# Patient Record
Sex: MALE | Race: WHITE | NOT HISPANIC OR LATINO
[De-identification: names, ages, dates, MRNs, and addresses within clinical notes are randomized per-mention and may not be internally consistent; named-entity substitution may affect disease eponyms.]

---

## 2017-07-14 ENCOUNTER — TRANSCRIPTION ENCOUNTER (OUTPATIENT)
Age: 57
End: 2017-07-14

## 2019-01-25 ENCOUNTER — TRANSCRIPTION ENCOUNTER (OUTPATIENT)
Age: 59
End: 2019-01-25

## 2021-06-20 ENCOUNTER — INPATIENT (INPATIENT)
Facility: HOSPITAL | Age: 61
LOS: 8 days | Discharge: HOME HEALTH | End: 2021-06-29
Attending: THORACIC SURGERY (CARDIOTHORACIC VASCULAR SURGERY) | Admitting: THORACIC SURGERY (CARDIOTHORACIC VASCULAR SURGERY)
Payer: COMMERCIAL

## 2021-06-20 VITALS
WEIGHT: 244.05 LBS | DIASTOLIC BLOOD PRESSURE: 101 MMHG | SYSTOLIC BLOOD PRESSURE: 189 MMHG | OXYGEN SATURATION: 98 % | HEART RATE: 98 BPM | TEMPERATURE: 98 F | RESPIRATION RATE: 20 BRPM

## 2021-06-20 LAB
ALBUMIN SERPL ELPH-MCNC: 4.6 G/DL — SIGNIFICANT CHANGE UP (ref 3.5–5.2)
ALP SERPL-CCNC: 145 U/L — HIGH (ref 30–115)
ALT FLD-CCNC: 18 U/L — SIGNIFICANT CHANGE UP (ref 0–41)
ANION GAP SERPL CALC-SCNC: 12 MMOL/L — SIGNIFICANT CHANGE UP (ref 7–14)
AST SERPL-CCNC: 13 U/L — SIGNIFICANT CHANGE UP (ref 0–41)
BASOPHILS # BLD AUTO: 0.02 K/UL — SIGNIFICANT CHANGE UP (ref 0–0.2)
BASOPHILS NFR BLD AUTO: 0.3 % — SIGNIFICANT CHANGE UP (ref 0–1)
BILIRUB SERPL-MCNC: 0.5 MG/DL — SIGNIFICANT CHANGE UP (ref 0.2–1.2)
BUN SERPL-MCNC: 19 MG/DL — SIGNIFICANT CHANGE UP (ref 10–20)
CALCIUM SERPL-MCNC: 9.6 MG/DL — SIGNIFICANT CHANGE UP (ref 8.5–10.1)
CHLORIDE SERPL-SCNC: 101 MMOL/L — SIGNIFICANT CHANGE UP (ref 98–110)
CO2 SERPL-SCNC: 22 MMOL/L — SIGNIFICANT CHANGE UP (ref 17–32)
CREAT SERPL-MCNC: 0.8 MG/DL — SIGNIFICANT CHANGE UP (ref 0.7–1.5)
EOSINOPHIL # BLD AUTO: 0.05 K/UL — SIGNIFICANT CHANGE UP (ref 0–0.7)
EOSINOPHIL NFR BLD AUTO: 0.7 % — SIGNIFICANT CHANGE UP (ref 0–8)
FLUAV AG NPH QL: NEGATIVE COUNTS — SIGNIFICANT CHANGE UP
FLUBV AG NPH QL: NEGATIVE COUNTS — SIGNIFICANT CHANGE UP
GLUCOSE BLDC GLUCOMTR-MCNC: 298 MG/DL — HIGH (ref 70–99)
GLUCOSE BLDC GLUCOMTR-MCNC: 349 MG/DL — HIGH (ref 70–99)
GLUCOSE SERPL-MCNC: 446 MG/DL — HIGH (ref 70–99)
HCT VFR BLD CALC: 44.4 % — SIGNIFICANT CHANGE UP (ref 42–52)
HGB BLD-MCNC: 14.9 G/DL — SIGNIFICANT CHANGE UP (ref 14–18)
IMM GRANULOCYTES NFR BLD AUTO: 0.6 % — HIGH (ref 0.1–0.3)
LYMPHOCYTES # BLD AUTO: 0.96 K/UL — LOW (ref 1.2–3.4)
LYMPHOCYTES # BLD AUTO: 13.7 % — LOW (ref 20.5–51.1)
MCHC RBC-ENTMCNC: 28.8 PG — SIGNIFICANT CHANGE UP (ref 27–31)
MCHC RBC-ENTMCNC: 33.6 G/DL — SIGNIFICANT CHANGE UP (ref 32–37)
MCV RBC AUTO: 85.7 FL — SIGNIFICANT CHANGE UP (ref 80–94)
MONOCYTES # BLD AUTO: 0.45 K/UL — SIGNIFICANT CHANGE UP (ref 0.1–0.6)
MONOCYTES NFR BLD AUTO: 6.4 % — SIGNIFICANT CHANGE UP (ref 1.7–9.3)
NEUTROPHILS # BLD AUTO: 5.5 K/UL — SIGNIFICANT CHANGE UP (ref 1.4–6.5)
NEUTROPHILS NFR BLD AUTO: 78.3 % — HIGH (ref 42.2–75.2)
NRBC # BLD: 0 /100 WBCS — SIGNIFICANT CHANGE UP (ref 0–0)
PLATELET # BLD AUTO: 226 K/UL — SIGNIFICANT CHANGE UP (ref 130–400)
POTASSIUM SERPL-MCNC: 4.4 MMOL/L — SIGNIFICANT CHANGE UP (ref 3.5–5)
POTASSIUM SERPL-SCNC: 4.4 MMOL/L — SIGNIFICANT CHANGE UP (ref 3.5–5)
PROT SERPL-MCNC: 7 G/DL — SIGNIFICANT CHANGE UP (ref 6–8)
RBC # BLD: 5.18 M/UL — SIGNIFICANT CHANGE UP (ref 4.7–6.1)
RBC # FLD: 12.5 % — SIGNIFICANT CHANGE UP (ref 11.5–14.5)
RSV RNA NPH QL NAA+NON-PROBE: NEGATIVE COUNTS — SIGNIFICANT CHANGE UP
SARS-COV-2 RNA SPEC QL NAA+PROBE: NEGATIVE COUNTS — SIGNIFICANT CHANGE UP
SODIUM SERPL-SCNC: 135 MMOL/L — SIGNIFICANT CHANGE UP (ref 135–146)
TROPONIN T SERPL-MCNC: 0.02 NG/ML — HIGH
TROPONIN T SERPL-MCNC: <0.01 NG/ML — SIGNIFICANT CHANGE UP
WBC # BLD: 7.02 K/UL — SIGNIFICANT CHANGE UP (ref 4.8–10.8)
WBC # FLD AUTO: 7.02 K/UL — SIGNIFICANT CHANGE UP (ref 4.8–10.8)

## 2021-06-20 PROCEDURE — 71046 X-RAY EXAM CHEST 2 VIEWS: CPT | Mod: 26

## 2021-06-20 PROCEDURE — 75574 CT ANGIO HRT W/3D IMAGE: CPT | Mod: 26,MA

## 2021-06-20 PROCEDURE — 99222 1ST HOSP IP/OBS MODERATE 55: CPT

## 2021-06-20 PROCEDURE — 99236 HOSP IP/OBS SAME DATE HI 85: CPT

## 2021-06-20 PROCEDURE — 93010 ELECTROCARDIOGRAM REPORT: CPT

## 2021-06-20 RX ORDER — METOPROLOL TARTRATE 50 MG
100 TABLET ORAL ONCE
Refills: 0 | Status: COMPLETED | OUTPATIENT
Start: 2021-06-20 | End: 2021-06-20

## 2021-06-20 RX ORDER — FAMOTIDINE 10 MG/ML
20 INJECTION INTRAVENOUS ONCE
Refills: 0 | Status: COMPLETED | OUTPATIENT
Start: 2021-06-20 | End: 2021-06-20

## 2021-06-20 RX ORDER — ASPIRIN/CALCIUM CARB/MAGNESIUM 324 MG
81 TABLET ORAL ONCE
Refills: 0 | Status: COMPLETED | OUTPATIENT
Start: 2021-06-20 | End: 2021-06-20

## 2021-06-20 RX ORDER — SODIUM CHLORIDE 9 MG/ML
1000 INJECTION, SOLUTION INTRAVENOUS ONCE
Refills: 0 | Status: COMPLETED | OUTPATIENT
Start: 2021-06-20 | End: 2021-06-20

## 2021-06-20 RX ADMIN — SODIUM CHLORIDE 1000 MILLILITER(S): 9 INJECTION, SOLUTION INTRAVENOUS at 06:05

## 2021-06-20 RX ADMIN — Medication 100 MILLIGRAM(S): at 09:28

## 2021-06-20 RX ADMIN — FAMOTIDINE 20 MILLIGRAM(S): 10 INJECTION INTRAVENOUS at 06:06

## 2021-06-20 RX ADMIN — Medication 100 MILLIGRAM(S): at 11:53

## 2021-06-20 RX ADMIN — Medication 81 MILLIGRAM(S): at 07:57

## 2021-06-20 NOTE — ED CDU PROVIDER INITIAL DAY NOTE - ATTENDING CONTRIBUTION TO CARE
Pt reports chest pain that started last night after eating and lasting several hours. Describes as a feeling of burping. . No associated shortness of breath, nausea, vomiting or diaphoresis. + DM. On exam S1S2 rrr, lungs clear, abdomen is soft nontender, ext neg for edema or tenderness.

## 2021-06-20 NOTE — ED PROVIDER NOTE - OBJECTIVE STATEMENT
The pt is a 60y M w/ hx of DM, HLD, BPH presenting with retrosternal chest pain that started last evening after eating spaghetti. Pt has never felt this pain before. No radiation. 4/10 in severity. Took a baby aspirin at home. Endorses mild SOB. Denies headache, N/V, abdominal pain, diarrhea, dysuria.

## 2021-06-20 NOTE — ED PROVIDER NOTE - ATTENDING CONTRIBUTION TO CARE
Patient is c/o chest pain, substernal area, denies trauma.   Vitals reviewed.   Lungs: CTA  abd: +BS, NT, ND, soft,   A/P: Chest pain,   labs, EKG, CXR,   reevaluation.

## 2021-06-20 NOTE — CONSULT NOTE ADULT - SUBJECTIVE AND OBJECTIVE BOX
HPI:  61 yo male patient with PMHx of BPH, HTN, DMII, presenting because of chest pain.  Patient reports having new onset chest pain, after dinner yesterday, non exertional.  No usual chest pain when he walks or climb stairs.    PAST MEDICAL & SURGICAL HISTORY    HTN  DMII  BPH    FAMILY HISTORY:  FAMILY HISTORY:  No pertinent family history in first degree relatives    SOCIAL HISTORY:  [x] exsmoker  []Alcohol  []Drug    ALLERGIES:  No Known Allergies    HOME MEDICATIONS:  atorva 20  alfuzocin  metformin/glipizide      VITALS:   T(F): 98.8 (06-20 @ 07:34), Max: 98.8 (06-20 @ 07:34)  HR: 82 (06-20 @ 07:34) (82 - 98)  BP: 183/97 (06-20 @ 07:34) (183/97 - 189/101)  BP(mean): --  RR: 18 (06-20 @ 07:34) (18 - 20)  SpO2: 98% (06-20 @ 07:34) (98% - 98%)    I&O's Summary      REVIEW OF SYSTEMS:  CONSTITUTIONAL: No weakness, fevers or chills  EYES: No visual changes  ENT: No vertigo or throat pain   NECK: No pain or stiffness  RESPIRATORY: No cough, wheezing, hemoptysis; No shortness of breath  CARDIOVASCULAR: (+) chest pain, no palpitations  GASTROINTESTINAL: No abdominal or epigastric pain. No nausea, vomiting, or hematemesis; No diarrhea or constipation. No melena or hematochezia.  GENITOURINARY: No dysuria, frequency or hematuria  NEUROLOGICAL: No numbness or weakness  SKIN: No itching, no rashes  MSK: No pain    PHYSICAL EXAM:  NEURO: patient is awake , alert and oriented  GEN: Not in acute distress  NECK: no thyroid enlargement, no JVD  LUNGS: Clear to auscultation bilaterally   CARDIOVASCULAR: S1/S2 present, RRR , no murmurs or rubs, no carotid bruits,  + PP bilaterally  ABD: Soft, non-tender, non-distended, +BS  EXT: No ERIN  SKIN: Intact    LABS:                        14.9   7.02  )-----------( 226      ( 20 Jun 2021 05:20 )             44.4     06-20    135  |  101  |  19  ----------------------------<  446<H>  4.4   |  22  |  0.8    Ca    9.6      20 Jun 2021 05:20    TPro  7.0  /  Alb  4.6  /  TBili  0.5  /  DBili  x   /  AST  13  /  ALT  18  /  AlkPhos  145<H>  06-20      Troponin T, Serum: 0.02 ng/mL *H* (06-20-21 @ 08:24)  Troponin T, Serum: <0.01 ng/mL (06-20-21 @ 05:20)    CARDIAC MARKERS ( 20 Jun 2021 08:24 )  x     / 0.02 ng/mL / x     / x     / x      CARDIAC MARKERS ( 20 Jun 2021 05:20 )  x     / <0.01 ng/mL / x     / x     / x            Troponin trend:            RADIOLOGY:  -CXR: no acute pathology    -TTE:  -CCTA:  -STRESS TEST:  -CATHETERIZATION:    ECG:  sinus at 98, PVC    TELEMETRY EVENTS:   HPI:  61 yo male patient with PMHx of BPH, HTN, DMII, presenting because of chest pain.  Patient reports having new onset chest pain, after dinner yesterday, non exertional.  No usual chest pain when he walks or climb stairs.      PAST MEDICAL & SURGICAL HISTORY    HTN  DMII  BPH    FAMILY HISTORY:  FAMILY HISTORY:  No pertinent family history in first degree relatives    SOCIAL HISTORY:  [x] exsmoker  []Alcohol  []Drug    ALLERGIES:  No Known Allergies    HOME MEDICATIONS:  atorva 20  alfuzocin  metformin/glipizide      VITALS:   T(F): 98.8 (06-20 @ 07:34), Max: 98.8 (06-20 @ 07:34)  HR: 82 (06-20 @ 07:34) (82 - 98)  BP: 183/97 (06-20 @ 07:34) (183/97 - 189/101)  BP(mean): --  RR: 18 (06-20 @ 07:34) (18 - 20)  SpO2: 98% (06-20 @ 07:34) (98% - 98%)    I&O's Summary      REVIEW OF SYSTEMS:  CONSTITUTIONAL: No weakness, fevers or chills  EYES: No visual changes  ENT: No vertigo or throat pain   NECK: No pain or stiffness  RESPIRATORY: No cough, wheezing, hemoptysis; No shortness of breath  CARDIOVASCULAR: (+) chest pain, no palpitations  GASTROINTESTINAL: No abdominal or epigastric pain. No nausea, vomiting, or hematemesis; No diarrhea or constipation. No melena or hematochezia.  GENITOURINARY: No dysuria, frequency or hematuria  NEUROLOGICAL: No numbness or weakness  SKIN: No itching, no rashes  MSK: No pain    PHYSICAL EXAM:  NEURO: patient is awake , alert and oriented  GEN: Not in acute distress  NECK: no thyroid enlargement, no JVD  LUNGS: Clear to auscultation bilaterally   CARDIOVASCULAR: S1/S2 present, RRR , no murmurs or rubs, no carotid bruits,  + PP bilaterally  ABD: Soft, non-tender, non-distended, +BS  EXT: No ERIN  SKIN: Intact    LABS:                        14.9   7.02  )-----------( 226      ( 20 Jun 2021 05:20 )             44.4     06-20    135  |  101  |  19  ----------------------------<  446<H>  4.4   |  22  |  0.8    Ca    9.6      20 Jun 2021 05:20    TPro  7.0  /  Alb  4.6  /  TBili  0.5  /  DBili  x   /  AST  13  /  ALT  18  /  AlkPhos  145<H>  06-20      Troponin T, Serum: 0.02 ng/mL *H* (06-20-21 @ 08:24)  Troponin T, Serum: <0.01 ng/mL (06-20-21 @ 05:20)    CARDIAC MARKERS ( 20 Jun 2021 08:24 )  x     / 0.02 ng/mL / x     / x     / x      CARDIAC MARKERS ( 20 Jun 2021 05:20 )  x     / <0.01 ng/mL / x     / x     / x            Troponin trend:            RADIOLOGY:  -CXR: no acute pathology    -TTE:  -CCTA:  -STRESS TEST:  -CATHETERIZATION:    ECG:  sinus at 98, PVC    TELEMETRY EVENTS:

## 2021-06-20 NOTE — ED PROVIDER NOTE - PHYSICAL EXAMINATION
[place] Vital Signs: I have reviewed the initial vital signs.  Constitutional: well-nourished, appears stated age, no acute distress  Cardiovascular: regular rate, regular rhythm, well-perfused extremities  Respiratory: unlabored respiratory effort, clear to auscultation bilaterally  Gastrointestinal: soft, non-tender abdomen  Musculoskeletal: supple neck, no lower extremity edema  Integumentary: warm, dry, no rash  Neurologic: awake, alert, extremities’ motor and sensory functions grossly intact  Psychiatric: appropriate mood, appropriate affect

## 2021-06-20 NOTE — CONSULT NOTE ADULT - ASSESSMENT
IMPRESSION:  - Atypical chest pain  - multiple risk factors: Ex-smoker, male, age, DM, HTN  - ECG and trops noted    PLAN:  - Repeat ECG and cardiac enzymes  - loading with aspirin  - continue statin  - Get CCTA  - Dispo after CCTA

## 2021-06-20 NOTE — ED ADULT NURSE REASSESSMENT NOTE - NS ED NURSE REASSESS COMMENT FT1
Pt is A&Ox4, currently denies pain or discomfort. Continuous cardiac monitoring maintained as ordered. Will continue to monitor.

## 2021-06-20 NOTE — ED CDU PROVIDER DISPOSITION NOTE - CLINICAL COURSE
pt has CAD RAD 4 with multi vessel severe narrowing and occlusion. Will admit for Cath tomorrow AM with Dr. Lo Pt presented with chest pain. One CE was positive. CCTA found severe CAD. Admited to tele for cardiac intervention. pt has CAD RAD 4 with multi vessel severe narrowing and occlusion. Will admit for Cath tomorrow AM with Dr. Lo

## 2021-06-20 NOTE — ED CDU PROVIDER INITIAL DAY NOTE - MEDICAL DECISION MAKING DETAILS
Second CE + troponin. Spoke to Dr. Lo will evaluate and decide if cath or CCTA. Second CE + troponin. Spoke to Dr. Lo will evaluate and decide if cath or CCTA. CCTA + for multivessel disease.

## 2021-06-20 NOTE — ED CDU PROVIDER INITIAL DAY NOTE - PROGRESS NOTE DETAILS
pt had 2nd troponin drawn was elevated 0.02. spoke with cardiology Dr. medina will evaluate patient bedside wants patient admitted for cath. will admit pt pts CCTA came back with CAD RAD 4 severe narrowing and occlusion will admit patient plan discussed with Dr. Ayala and Dr. Lo

## 2021-06-20 NOTE — ED CDU PROVIDER INITIAL DAY NOTE - OBJECTIVE STATEMENT
pt is a 60y male with pmhx of DM, HLD, BPH presents c/o cp that started s/p drinking alcohol and eating spaghetti. pt states his pain lasted a few hours. pt noted that he felt like belching. pt denies any diaphoresis, nausea, vomiting, dizziness, headache. pt denies any family hx of CAD, and was former smoker.

## 2021-06-21 LAB
A1C WITH ESTIMATED AVERAGE GLUCOSE RESULT: 12 % — HIGH (ref 4–5.6)
ALBUMIN SERPL ELPH-MCNC: 4.4 G/DL — SIGNIFICANT CHANGE UP (ref 3.5–5.2)
ALP SERPL-CCNC: 80 U/L — SIGNIFICANT CHANGE UP (ref 30–115)
ALT FLD-CCNC: 16 U/L — SIGNIFICANT CHANGE UP (ref 0–41)
ANION GAP SERPL CALC-SCNC: 10 MMOL/L — SIGNIFICANT CHANGE UP (ref 7–14)
AST SERPL-CCNC: 15 U/L — SIGNIFICANT CHANGE UP (ref 0–41)
BASOPHILS # BLD AUTO: 0.03 K/UL — SIGNIFICANT CHANGE UP (ref 0–0.2)
BASOPHILS NFR BLD AUTO: 0.4 % — SIGNIFICANT CHANGE UP (ref 0–1)
BILIRUB SERPL-MCNC: 0.9 MG/DL — SIGNIFICANT CHANGE UP (ref 0.2–1.2)
BUN SERPL-MCNC: 12 MG/DL — SIGNIFICANT CHANGE UP (ref 10–20)
CALCIUM SERPL-MCNC: 9.3 MG/DL — SIGNIFICANT CHANGE UP (ref 8.5–10.1)
CHLORIDE SERPL-SCNC: 102 MMOL/L — SIGNIFICANT CHANGE UP (ref 98–110)
CHOLEST SERPL-MCNC: 182 MG/DL — SIGNIFICANT CHANGE UP
CO2 SERPL-SCNC: 27 MMOL/L — SIGNIFICANT CHANGE UP (ref 17–32)
CREAT SERPL-MCNC: 0.8 MG/DL — SIGNIFICANT CHANGE UP (ref 0.7–1.5)
EOSINOPHIL # BLD AUTO: 0.11 K/UL — SIGNIFICANT CHANGE UP (ref 0–0.7)
EOSINOPHIL NFR BLD AUTO: 1.3 % — SIGNIFICANT CHANGE UP (ref 0–8)
ESTIMATED AVERAGE GLUCOSE: 298 MG/DL — HIGH (ref 68–114)
GLUCOSE BLDC GLUCOMTR-MCNC: 210 MG/DL — HIGH (ref 70–99)
GLUCOSE BLDC GLUCOMTR-MCNC: 245 MG/DL — HIGH (ref 70–99)
GLUCOSE BLDC GLUCOMTR-MCNC: 270 MG/DL — HIGH (ref 70–99)
GLUCOSE SERPL-MCNC: 254 MG/DL — HIGH (ref 70–99)
HCT VFR BLD CALC: 44.2 % — SIGNIFICANT CHANGE UP (ref 42–52)
HCV AB S/CO SERPL IA: 0.03 COI — SIGNIFICANT CHANGE UP
HCV AB SERPL-IMP: SIGNIFICANT CHANGE UP
HDLC SERPL-MCNC: 42 MG/DL — SIGNIFICANT CHANGE UP
HGB BLD-MCNC: 14.9 G/DL — SIGNIFICANT CHANGE UP (ref 14–18)
IMM GRANULOCYTES NFR BLD AUTO: 0.5 % — HIGH (ref 0.1–0.3)
LIPID PNL WITH DIRECT LDL SERPL: 121 MG/DL — HIGH
LYMPHOCYTES # BLD AUTO: 1.76 K/UL — SIGNIFICANT CHANGE UP (ref 1.2–3.4)
LYMPHOCYTES # BLD AUTO: 21.2 % — SIGNIFICANT CHANGE UP (ref 20.5–51.1)
MAGNESIUM SERPL-MCNC: 2 MG/DL — SIGNIFICANT CHANGE UP (ref 1.8–2.4)
MCHC RBC-ENTMCNC: 28.8 PG — SIGNIFICANT CHANGE UP (ref 27–31)
MCHC RBC-ENTMCNC: 33.7 G/DL — SIGNIFICANT CHANGE UP (ref 32–37)
MCV RBC AUTO: 85.5 FL — SIGNIFICANT CHANGE UP (ref 80–94)
MONOCYTES # BLD AUTO: 0.68 K/UL — HIGH (ref 0.1–0.6)
MONOCYTES NFR BLD AUTO: 8.2 % — SIGNIFICANT CHANGE UP (ref 1.7–9.3)
MRSA PCR RESULT.: NEGATIVE — SIGNIFICANT CHANGE UP
NEUTROPHILS # BLD AUTO: 5.7 K/UL — SIGNIFICANT CHANGE UP (ref 1.4–6.5)
NEUTROPHILS NFR BLD AUTO: 68.4 % — SIGNIFICANT CHANGE UP (ref 42.2–75.2)
NON HDL CHOLESTEROL: 140 MG/DL — HIGH
NRBC # BLD: 0 /100 WBCS — SIGNIFICANT CHANGE UP (ref 0–0)
PLATELET # BLD AUTO: 229 K/UL — SIGNIFICANT CHANGE UP (ref 130–400)
POTASSIUM SERPL-MCNC: 4.3 MMOL/L — SIGNIFICANT CHANGE UP (ref 3.5–5)
POTASSIUM SERPL-SCNC: 4.3 MMOL/L — SIGNIFICANT CHANGE UP (ref 3.5–5)
PROT SERPL-MCNC: 6.6 G/DL — SIGNIFICANT CHANGE UP (ref 6–8)
RBC # BLD: 5.17 M/UL — SIGNIFICANT CHANGE UP (ref 4.7–6.1)
RBC # FLD: 12.5 % — SIGNIFICANT CHANGE UP (ref 11.5–14.5)
SODIUM SERPL-SCNC: 139 MMOL/L — SIGNIFICANT CHANGE UP (ref 135–146)
TRIGL SERPL-MCNC: 179 MG/DL — HIGH
TROPONIN T SERPL-MCNC: 0.07 NG/ML — CRITICAL HIGH
WBC # BLD: 8.32 K/UL — SIGNIFICANT CHANGE UP (ref 4.8–10.8)
WBC # FLD AUTO: 8.32 K/UL — SIGNIFICANT CHANGE UP (ref 4.8–10.8)

## 2021-06-21 PROCEDURE — 93458 L HRT ARTERY/VENTRICLE ANGIO: CPT | Mod: 26

## 2021-06-21 PROCEDURE — 99233 SBSQ HOSP IP/OBS HIGH 50: CPT

## 2021-06-21 PROCEDURE — 93306 TTE W/DOPPLER COMPLETE: CPT | Mod: 26

## 2021-06-21 PROCEDURE — 99253 IP/OBS CNSLTJ NEW/EST LOW 45: CPT

## 2021-06-21 PROCEDURE — 99232 SBSQ HOSP IP/OBS MODERATE 35: CPT | Mod: 57

## 2021-06-21 PROCEDURE — 99223 1ST HOSP IP/OBS HIGH 75: CPT

## 2021-06-21 PROCEDURE — 93880 EXTRACRANIAL BILAT STUDY: CPT | Mod: 26

## 2021-06-21 PROCEDURE — 71250 CT THORAX DX C-: CPT | Mod: 26,59

## 2021-06-21 RX ORDER — TAMSULOSIN HYDROCHLORIDE 0.4 MG/1
0.4 CAPSULE ORAL AT BEDTIME
Refills: 0 | Status: DISCONTINUED | OUTPATIENT
Start: 2021-06-21 | End: 2021-06-24

## 2021-06-21 RX ORDER — SODIUM CHLORIDE 9 MG/ML
1000 INJECTION, SOLUTION INTRAVENOUS
Refills: 0 | Status: DISCONTINUED | OUTPATIENT
Start: 2021-06-21 | End: 2021-06-24

## 2021-06-21 RX ORDER — INSULIN LISPRO 100/ML
4 VIAL (ML) SUBCUTANEOUS
Refills: 0 | Status: DISCONTINUED | OUTPATIENT
Start: 2021-06-21 | End: 2021-06-22

## 2021-06-21 RX ORDER — GLUCAGON INJECTION, SOLUTION 0.5 MG/.1ML
1 INJECTION, SOLUTION SUBCUTANEOUS ONCE
Refills: 0 | Status: DISCONTINUED | OUTPATIENT
Start: 2021-06-21 | End: 2021-06-24

## 2021-06-21 RX ORDER — INSULIN LISPRO 100/ML
VIAL (ML) SUBCUTANEOUS
Refills: 0 | Status: DISCONTINUED | OUTPATIENT
Start: 2021-06-21 | End: 2021-06-24

## 2021-06-21 RX ORDER — NITROGLYCERIN 6.5 MG
0.4 CAPSULE, EXTENDED RELEASE ORAL
Refills: 0 | Status: DISCONTINUED | OUTPATIENT
Start: 2021-06-21 | End: 2021-06-22

## 2021-06-21 RX ORDER — HEPARIN SODIUM 5000 [USP'U]/ML
5000 INJECTION INTRAVENOUS; SUBCUTANEOUS EVERY 8 HOURS
Refills: 0 | Status: DISCONTINUED | OUTPATIENT
Start: 2021-06-22 | End: 2021-06-23

## 2021-06-21 RX ORDER — METOPROLOL TARTRATE 50 MG
12.5 TABLET ORAL EVERY 12 HOURS
Refills: 0 | Status: DISCONTINUED | OUTPATIENT
Start: 2021-06-21 | End: 2021-06-22

## 2021-06-21 RX ORDER — DEXTROSE 50 % IN WATER 50 %
15 SYRINGE (ML) INTRAVENOUS ONCE
Refills: 0 | Status: DISCONTINUED | OUTPATIENT
Start: 2021-06-21 | End: 2021-06-24

## 2021-06-21 RX ORDER — DEXTROSE 50 % IN WATER 50 %
12.5 SYRINGE (ML) INTRAVENOUS ONCE
Refills: 0 | Status: DISCONTINUED | OUTPATIENT
Start: 2021-06-21 | End: 2021-06-24

## 2021-06-21 RX ORDER — DEXTROSE 50 % IN WATER 50 %
25 SYRINGE (ML) INTRAVENOUS ONCE
Refills: 0 | Status: DISCONTINUED | OUTPATIENT
Start: 2021-06-21 | End: 2021-06-24

## 2021-06-21 RX ORDER — ASPIRIN/CALCIUM CARB/MAGNESIUM 324 MG
81 TABLET ORAL DAILY
Refills: 0 | Status: DISCONTINUED | OUTPATIENT
Start: 2021-06-21 | End: 2021-06-24

## 2021-06-21 RX ORDER — ATORVASTATIN CALCIUM 80 MG/1
80 TABLET, FILM COATED ORAL AT BEDTIME
Refills: 0 | Status: DISCONTINUED | OUTPATIENT
Start: 2021-06-21 | End: 2021-06-24

## 2021-06-21 RX ORDER — INSULIN GLARGINE 100 [IU]/ML
10 INJECTION, SOLUTION SUBCUTANEOUS EVERY MORNING
Refills: 0 | Status: DISCONTINUED | OUTPATIENT
Start: 2021-06-21 | End: 2021-06-22

## 2021-06-21 RX ADMIN — ATORVASTATIN CALCIUM 80 MILLIGRAM(S): 80 TABLET, FILM COATED ORAL at 21:13

## 2021-06-21 RX ADMIN — Medication 4: at 17:39

## 2021-06-21 RX ADMIN — INSULIN GLARGINE 10 UNIT(S): 100 INJECTION, SOLUTION SUBCUTANEOUS at 08:24

## 2021-06-21 RX ADMIN — Medication 12.5 MILLIGRAM(S): at 17:40

## 2021-06-21 RX ADMIN — TAMSULOSIN HYDROCHLORIDE 0.4 MILLIGRAM(S): 0.4 CAPSULE ORAL at 21:12

## 2021-06-21 RX ADMIN — Medication 4 UNIT(S): at 17:39

## 2021-06-21 RX ADMIN — Medication 81 MILLIGRAM(S): at 11:37

## 2021-06-21 NOTE — H&P ADULT - NSHPPHYSICALEXAM_GEN_ALL_CORE
GENERAL: NAD, lying in bed comfortably  HEAD:  Atraumatic, Normocephalic  EYES: EOMI, PERRLA, conjunctiva and sclera clear  ENT: Moist mucous membranes  NECK: Supple, No JVD  CHEST/LUNG: Clear to auscultation bilaterally; No rales, rhonchi, wheezing, or rubs.   HEART: Regular rate and rhythm; No murmurs, rubs, or gallops  ABDOMEN: Bowel sounds present; Soft, Nontender  EXTREMITIES:  2+ Peripheral Pulses, brisk capillary refill. No clubbing, cyanosis, or edema  NERVOUS SYSTEM:  Alert & Oriented X3, speech clear. No deficits   MSK: FROM all 4 extremities, full and equal strength

## 2021-06-21 NOTE — H&P ADULT - NSHPLABSRESULTS_GEN_ALL_CORE
14.9   7.02  )-----------( 226      ( 20 Jun 2021 05:20 )             44.4       06-20    135  |  101  |  19  ----------------------------<  446<H>  4.4   |  22  |  0.8    Ca    9.6      20 Jun 2021 05:20    TPro  7.0  /  Alb  4.6  /  TBili  0.5  /  DBili  x   /  AST  13  /  ALT  18  /  AlkPhos  145<H>  06-20          CARDIAC MARKERS ( 20 Jun 2021 08:24 )  x     / 0.02 ng/mL / x     / x     / x      CARDIAC MARKERS ( 20 Jun 2021 05:20 )  x     / <0.01 ng/mL / x     / x     / x            CAPILLARY BLOOD GLUCOSE      POCT Blood Glucose.: 298 mg/dL (20 Jun 2021 21:56)

## 2021-06-21 NOTE — H&P ADULT - ASSESSMENT
60y M w/ hx of DM, HLD, BPH presenting with atypical chest pain, found to have 3V disease on CCTA    #Atypical chest pain/ 3V disease  - NPO after midnight for cath in the AM  - Loaded with aspirin, c/w aspirin   - C/w statin   - HR controlled, no BB  - No Plavix as may need CABG this admission     #DM, F/u A1c, Start insulin regimen   #DLD c/w statin   #HTN      Activity as tolerated   Diet DASH/TLC  DVT PPX Lovenox   GI ppx not indicated   Dispo from home   Code full    60y M w/ hx of DM, HLD, BPH presenting with atypical chest pain, found to have 3V disease on CCTA    #Atypical chest pain/ 3V disease  - NPO after midnight for cath in the AM  - Loaded with aspirin, c/w aspirin   - C/w statin   - HR controlled, no BB  - No Plavix as may need CABG this admission   - F/u repeat EKG and troponin   - F/u echo     #DM, F/u A1c, Start insulin regimen   #DLD c/w statin   #HTN      Activity as tolerated   Diet DASH/TLC  DVT PPX Lovenox after cath  GI ppx not indicated   Dispo from home   Code full    60y M w/ hx of DM, HLD, BPH presenting with atypical chest pain, found to have 3V disease on CCTA    #Atypical chest pain/ 3V disease/ r/o ACS  - NPO after midnight for cath in the AM  - Loaded with aspirin, c/w aspirin   - C/w statin   - HR controlled, no BB  - No Plavix as may need CABG this admission   - F/u repeat EKG and troponin   - F/u echo     #DM, F/u A1c, Start insulin regimen   #DLD c/w statin, f/u lipid profile       Activity as tolerated   Diet DASH/TLC, NPO for now   DVT PPX Lovenox after cath  GI ppx not indicated   Dispo from home   Code full    60y M w/ hx of DM, HLD, BPH presenting with atypical chest pain, found to have 3V disease on CCTA    #Atypical chest pain/ 3V disease/ r/o ACS  - NPO after midnight for cath in the AM  - Loaded with aspirin, c/w aspirin   - C/w statin   - HR controlled, no BB  - No Plavix as may need CABG this admission   - F/u repeat EKG and troponin   - F/u echo     #DM, hold PO medications  F/u A1c, Start insulin regimen   #DLD c/w statin but increase from 20 to 80 ( high intensity), f/u lipid profile   #BPH C/w alfuzosin     Activity as tolerated   Diet DASH/TLC, NPO for now   DVT PPX Lovenox after cath  GI ppx not indicated   Dispo from home   Code full

## 2021-06-21 NOTE — H&P ADULT - ATTENDING COMMENTS
61 YO M with a PMH of DM2, HLD, and BPH who presents ot the hospital with a c/o CP for the past x 1 day. Described as tightness, substernal, non-radiating, and constant (lasting hours, now resolved). - worse with exertion. Associated with - SOB, - nausea, - palpitations, and - diaphoresis. Did not take SLN or ASA prior to arrival. Denies any fevers/chills, cough, ABD pain, LE swelling, dysuria, headaches, or rashes.     In the ED, cardiac enzymes were negative and an EKG showed no ischemic changes. Placed in OBS. CCTA showed moderate stenosis in the proximal LAD. ASA given in the ED. Cardio consulted and plans to take pt for CATH in the AM    - family hx of heart disease    Physical exam shows pt in NAD, resting comfortably. VSS, afebrile, not hypoxic on RA. A&Ox3. Neuro exam intact. CTA B/L with no W/C/R. RRR, no M/G/R. ABD is soft and non-tender to palpation, normoactive BSs. LEs without swelling, pulses palpated bilaterally. No rashes. Labs and imaging as above resident note.     Chest pain, positive CCTA, rule out ACS. Admit to tele. Cardio is following, plan is for CATH in the AM. Serial cardiac enzymes and EKGs. A1c, Lipids. ASA given in the ED. Echo. PRN pain meds. Restart home meds.     Diabetes mellitus with hyperglycemia. A1c. FSs. Insulin PRN.     Hx of HLD and BPH. Restart home meds, except as stated above. DVT PPX. Inform PCP of pt's admission to hospital. My note supersedes the residents note.

## 2021-06-21 NOTE — H&P ADULT - HISTORY OF PRESENT ILLNESS
60y M w/ hx of DM, HLD, BPH presenting with retrosternal chest pain that started last evening after eating spaghetti. Pt has never felt this pain before. No radiation. 4/10 in severity, non exertional . Took a baby aspirin at home. Endorses mild SOB. Denies headache, N/V, abdominal pain, diarrhea, dysuria.    Was admitted for Obs,where he underwent CCTA that showed 3V disease, EKG with no ischemic changes, troponin <0.01--> 0.02, admitted for Cath in the AM, Loaded with aspirin in the ED   60y M w/ hx of DM, HLD, BPH presenting with retrosternal chest pain that started last evening after dinner.. No radiation. 4/10 in severity, non exertional . Took a baby aspirin at home. Patient had episode of chest pain last week that resolved spontaneously, no previous chest pain or hx of cardiac disease Endorses mild SOB on exertion. Denies headache, N/V, abdominal pain, diarrhea, dysuria.    Was admitted for Obs,where he underwent CCTA that showed 3V disease, EKG with no ischemic changes, troponin <0.01--> 0.02, admitted for Cath in the AM, Loaded with aspirin in the ED

## 2021-06-21 NOTE — CHART NOTE - NSCHARTNOTEFT_GEN_A_CORE
Patient seen at bedside. Denies CP at rest or when walking to the bathroom. Elevated trop this morning noted. No acute events overnight.     PHYSICAL EXAM:  GENERAL: NAD, lying in bed comfortably  HEAD:  Atraumatic, Normocephalic  EYES: EOMI, PERRLA, conjunctiva and sclera clear  ENT: Moist mucous membranes  NECK: Supple, No JVD  CHEST/LUNG: Clear to auscultation bilaterally; No rales, rhonchi, wheezing, or rubs. Unlabored respirations  HEART: Regular rate and rhythm; No murmurs, rubs, or gallops  ABDOMEN: Bowel sounds present; Soft, Nontender, Nondistended. No hepatomegally  EXTREMITIES:  2+ Peripheral Pulses, brisk capillary refill. No clubbing, cyanosis, or edema  NERVOUS SYSTEM:  Alert & Oriented X3, speech clear. No deficits   MSK: FROM all 4 extremities, full and equal strength  SKIN: No rashes or lesions    60y M w/ hx of DM, HLD, BPH presenting with atypical chest pain, found to have 3V disease on CCTA    #Atypical chest pain - likely NSTEMI  #new 3 vessel CAD   - CCTA 6/20: 3v disease, calcium score 638     - prox-mid LAD severe narrowing     - mid-distal LCX severe narrowing     - prox RCA severe narrowing   - trop: 0.01 > 0.02 > 0.07  - repeat ekg this am stable   - F/u echo   - Loaded with aspirin, c/w aspirin; c/w atorvastatin 80mg hs   - No Plavix as may need CABG this admission   - HR controlled, no BB  - Cath today     #DM, hold PO medications  F/u A1c, adjust insulin regimen   #DLD c/w statin but increase from 20 to 80 ( high intensity), lipid profile: ,   #BPH C/w alfuzosin     Diet DASH/TLC, NPO for now   GI ppx: none   DVT PPX Lovenox after cath  GI ppx not indicated   Dispo from home; cath today, f/u cath results and for stents vs. CT surgery/CABG eval   Code full Patient seen at bedside. Denies CP at rest or when walking to the bathroom. Elevated trop this morning noted. No acute events overnight.     PHYSICAL EXAM:  GENERAL: NAD, lying in bed comfortably  HEAD:  Atraumatic, Normocephalic  EYES: EOMI, PERRLA, conjunctiva and sclera clear  ENT: Moist mucous membranes  NECK: Supple, No JVD  CHEST/LUNG: Clear to auscultation bilaterally; No rales, rhonchi, wheezing, or rubs. Unlabored respirations  HEART: Regular rate and rhythm; No murmurs, rubs, or gallops  ABDOMEN: Bowel sounds present; Soft, Nontender, Nondistended. No hepatomegally  EXTREMITIES:  2+ Peripheral Pulses, brisk capillary refill. No clubbing, cyanosis, or edema  NERVOUS SYSTEM:  Alert & Oriented X3, speech clear. No deficits   MSK: FROM all 4 extremities, full and equal strength  SKIN: No rashes or lesions    60y M w/ hx of DM, HLD, BPH presenting with atypical chest pain, found to have 3V disease on CCTA    #Atypical chest pain - likely NSTEMI  #new 3 vessel CAD   - CCTA 6/20: 3v disease, calcium score 638     - prox-mid LAD severe narrowing     - mid-distal LCX severe narrowing     - prox RCA severe narrowing   - trop: 0.01 > 0.02 > 0.07  - repeat ekg this am stable   - TTE 6/21: EF 62%, normal systolic/diastolic function; mild dilation of ascending aorta 4cm   - Loaded with aspirin, c/w aspirin; c/w atorvastatin 80mg hs   - No Plavix as may need CABG this admission   - HR controlled, no BB  - Cath today as add on     #T2DM uncontrolled  -  takes metformin 500mg BID at home  - A1c 6/21/2021: 12   - hold home meds, monitor fs, adjust insulin as needed    #DLD: c/w statin but increase from 20 to 80 ( high intensity), lipid profile: ,   #BPH: c/w alfuzosin     Diet DASH/TLC, NPO for now   GI ppx: none   DVT PPX Lovenox after cath  GI ppx not indicated   Dispo from home; cath today, f/u cath results and for stents vs. CT surgery/CABG eval   Code full Patient seen at bedside. Denies CP at rest or when walking to the bathroom. Elevated trop this morning noted. No acute events overnight.     PHYSICAL EXAM:  GENERAL: NAD, lying in bed comfortably  HEAD:  Atraumatic, Normocephalic  EYES: EOMI, PERRLA, conjunctiva and sclera clear  ENT: Moist mucous membranes  NECK: Supple, No JVD  CHEST/LUNG: Clear to auscultation bilaterally; No rales, rhonchi, wheezing, or rubs. Unlabored respirations  HEART: Regular rate and rhythm; No murmurs, rubs, or gallops  ABDOMEN: Bowel sounds present; Soft, Nontender, Nondistended. No hepatomegally  EXTREMITIES:  2+ Peripheral Pulses, brisk capillary refill. No clubbing, cyanosis, or edema  NERVOUS SYSTEM:  Alert & Oriented X3, speech clear. No deficits   MSK: FROM all 4 extremities, full and equal strength  SKIN: No rashes or lesions    60y M w/ hx of DM, HLD, BPH presenting with atypical chest pain, found to have 3V disease on CCTA    #Atypical chest pain - likely NSTEMI  #new 3 vessel CAD   - CCTA 6/20: 3v disease, calcium score 638     - prox-mid LAD severe narrowing     - mid-distal LCX severe narrowing     - prox RCA severe narrowing   - trop: 0.01 > 0.02 > 0.07  - repeat ekg this am stable   - TTE 6/21: EF 62%, normal systolic/diastolic function; mild dilation of ascending aorta 4cm   - Loaded with aspirin, c/w aspirin; c/w atorvastatin 80mg hs   - No Plavix as may need CABG this admission   - HR controlled, no BB  - Cath today as add on     #T2DM uncontrolled  -  takes metformin 500mg BID at home  - A1c 6/21/2021: 12   - hold home meds, monitor fs, adjust insulin as needed    #DLD: c/w statin but increase from 20 to 80 ( high intensity), lipid profile: ,   #BPH: c/w alfuzosin     Diet DASH/TLC, NPO for now   GI ppx: none   DVT PPX Lovenox after cath  GI ppx not indicated   Dispo from home; cath today, f/u cath results for stents vs. CT surgery/CABG eval   Code full

## 2021-06-21 NOTE — CHART NOTE - NSCHARTNOTEFT_GEN_A_CORE
PRE-OP DIAGNOSIS: unstable angina, HTN, DM, DL ,     PROCEDURE: C with coronary angiography    Physician: Dr Lo  Assistant: Per    ANESTHESIA TYPE:  [  ]General Anesthesia  [  ] Sedation  [ x ] Local/Regional    ESTIMATED BLOOD LOSS:    10   mL    CONDITION  [  ] Critical  [  ] Serious  [  ]Fair  [x  ]Good      SPECIMENS REMOVED (IF APPLICABLE): N/A      IV CONTRAST:      40       mL      IMPLANTS (IF APPLICABLE)      FINDINGS    Left Heart Catheterization:    LVEDP: normal     LEFT HEART CATHETERIZATION                                    Left main mild disease distal     LAD:  ostial 85% ,    Prox 7-80% diffuse calcified vessel mid 90% diffuse Distal  moderate disease                    Diag: ostail 80% small to medium size     Left Circumflex: Prox mild disease, Distal 80% at bifurcation   OM1: ostial 70%  OM2 small 90% lesion      Right Coronary Artery: Prox 80% Mid 90-95% diffuse lesion , distal 70% lesion   RPDA patent CLAUDIA 2 flow   RPL patent     Ramus Intermed: moderate diffuse disease     DOMINANCE: Right    ACCESS: right radial   CLOSURE: D stat     INTERVENTION  none    POST-OP DIAGNOSIS  Triple vessel disease, SYNTAX score 37         PLAN OF CARE    [x ] Return to In-patient bed    [ x] CT Surgery consult called  [ x]  Continue ASA, B-blocker & Statin therapy  IV hydration monitor kidney function    Results of procedure/ plan of care discussed with patient/  in detail. PRE-OP DIAGNOSIS: unstable angina, HTN, DM, DL ,     PROCEDURE: C with coronary angiography    Physician: Dr Lo  Assistant: Per    ANESTHESIA TYPE:  [  ]General Anesthesia  [  ] Sedation  [ x ] Local/Regional    ESTIMATED BLOOD LOSS:    10   mL    CONDITION  [  ] Critical  [  ] Serious  [  ]Fair  [x  ]Good      SPECIMENS REMOVED (IF APPLICABLE): N/A      IV CONTRAST:      40       mL      IMPLANTS (IF APPLICABLE)      FINDINGS    Left Heart Catheterization:    LVEDP: normal     LEFT HEART CATHETERIZATION                                    Left main mild disease distal     LAD:  ostial 85% ,    Prox 70-80% diffuse calcified vessel mid 90% diffuse Distal  moderate disease                    Diag: ostail 80% small to medium size     Left Circumflex: Prox mild disease, Distal 80% at bifurcation   OM1: ostial 70%  OM2 small 90% lesion      Right Coronary Artery: Prox 80% Mid 90-95% diffuse lesion , distal 70% lesion   RPDA patent CLAUDIA 2 flow   RPL patent     Ramus Intermed: moderate diffuse disease     DOMINANCE: Right    ACCESS: right radial   CLOSURE: D stat     INTERVENTION  none    POST-OP DIAGNOSIS  Triple vessel disease, SYNTAX score 37         PLAN OF CARE    [x ] Return to In-patient bed    [ x] CT Surgery consult called  [ x]  Continue ASA, B-blocker & Statin therapy  IV hydration monitor kidney function    Results of procedure/ plan of care discussed with patient/  in detail. PRE-OP DIAGNOSIS: unstable angina, HTN, DM, DL ,     PROCEDURE: C with coronary angiography    Physician: Dr Lo  Assistant: Per    ANESTHESIA TYPE:  [  ]General Anesthesia  [  ] Sedation  [ x ] Local/Regional    ESTIMATED BLOOD LOSS:    10   mL    CONDITION  [  ] Critical  [  ] Serious  [  ]Fair  [x  ]Good      SPECIMENS REMOVED (IF APPLICABLE): N/A      IV CONTRAST:      40       mL      IMPLANTS (IF APPLICABLE)      FINDINGS    Left Heart Catheterization:    LVEDP: normal     LEFT HEART CATHETERIZATION                                    Left main mild disease distal     LAD:  Ostial 85% ,    Prox 70-80% diffuse calcified vessel mid 90% diffuse Distal  moderate disease                    Diag: Ostail 80% small to medium size     Left Circumflex: Prox mild disease, Distal 80% at bifurcation   OM1: ostial 70%  OM2 small 90% lesion      Right Coronary Artery: Prox 80% Mid 90-95% diffuse lesion , distal 70% lesion   RPDA patent CLAUDIA 2 flow   RPL patent     Ramus Intermed: moderate diffuse disease     DOMINANCE: Right    ACCESS: right radial   CLOSURE: D stat     INTERVENTION  none    POST-OP DIAGNOSIS  Triple vessel disease, SYNTAX score 37         PLAN OF CARE    [x ] Return to In-patient bed    [ x] CT Surgery consult called  [ x]  Continue ASA, B-blocker & Statin therapy  IV hydration monitor kidney function    Results of procedure/ plan of care discussed with patient/  in detail. PRE-OP DIAGNOSIS: unstable angina, HTN, DM, DL ,     PROCEDURE: C with coronary angiography    Physician: Dr Lo  Assistant: Per    ANESTHESIA TYPE:  [  ]General Anesthesia  [  ] Sedation  [ x ] Local/Regional    ESTIMATED BLOOD LOSS:    10   mL    CONDITION  [  ] Critical  [  ] Serious  [  ]Fair  [x  ]Good      SPECIMENS REMOVED (IF APPLICABLE): N/A      IV CONTRAST:      40       mL      IMPLANTS (IF APPLICABLE)      FINDINGS    Left Heart Catheterization:    LVEDP: normal     LEFT HEART CATHETERIZATION                                    Left main mild disease distal     LAD:  Ostial 85% ,    Prox 70-80% diffuse calcified vessel mid 90% diffuse Distal  moderate disease                    Diag: Ostail 80% small to medium size     Left Circumflex: Prox mild disease, Distal 80% at bifurcation   OM1: ostial 70%  OM2 small 90% lesion      Right Coronary Artery: Prox 80% Mid 90-95% diffuse lesion , distal 70% lesion   RPDA patent CLAUDIA 2 flow   RPL patent     Ramus Intermed: moderate diffuse disease .    DOMINANCE: Right    ACCESS: right radial   CLOSURE: D stat     INTERVENTION  none    POST-OP DIAGNOSIS  Triple vessel disease, SYNTAX score 37         PLAN OF CARE    [x ] Return to In-patient bed    [ x] CT Surgery consult called  [ x]  Continue ASA, B-blocker & Statin therapy  IV hydration monitor kidney function    Results of procedure/ plan of care discussed with patient/  in detail.

## 2021-06-21 NOTE — CONSULT NOTE ADULT - ATTENDING COMMENTS
60-year-old gentleman we were called to the cardiac catheterization laboratory to evaluate by Dr. Edilberto Lo.    The patient was admitted through the emergency room with a non-ST elevation myocardial infarction  He has a history of hypertension and hypercholesterolemia  He has a history of very poorly controlled non-insulin-dependent diabetes and his most recent hemoglobin A1c is 12  He does complain of some vague tingling and numbness suggestive of peripheral neuropathy in his legs and also has possible diabetic eye involvement.    He says that he has noticed worsening shortness of breath with dyspnea on exertion over the last few months and increasing fatigability and has put on significant weight and currently weighs about 245 pounds.    He did notice occasional exertional chest pain which ultimately led to his admission for ongoing typical anginal pain associated with his non-ST elevation myocardial infarction.  He has a family history of heart disease with his father having had heart disease in his early 60s.  He is an ex-smoker with a 25-pack-year history of smoking having quit many years ago.    I had a chance to review his cardiac catheterization which shows severe diffuse triple-vessel coronary artery disease with multisegmental 80% disease in the proximal and mid LAD, the right coronary artery as well as the circumflex coronary artery.    Given his young age and severe diffuse triple-vessel coronary disease with poorly controlled diabetes surgical coronary revascularization is probably his best option.  I offered the patient and his wife, Angelic telephone #[phone] open heart surgery with coronary artery bypass grafting after completing the full preoperative work-up.  I discussed the risks, benefits, and alternatives to the surgical procedure in detail.  The risks that we discussed included but were not limited to bleeding, infection, stroke, myocardial infarction, renal failure, multi-organ failure, death, etc. amongst others were discussed in detail.  All questions. were answered.  The patient and the family want to proceed with the high risk intervention.
IMPRESSION:  - Atypical chest pain  - multiple risk factors: Ex-smoker, male, age, DM, HTN  - ECG and trops noted    PLAN:  - Repeat ECG and cardiac enzymes  - loading with aspirin  - continue statin  - Get CCTA  - Dispo after CCTA - if significant lesion - admit for cath. If no obstructive disease - medical therapy, d/c home

## 2021-06-22 LAB
ALBUMIN SERPL ELPH-MCNC: 4.7 G/DL — SIGNIFICANT CHANGE UP (ref 3.5–5.2)
ALP SERPL-CCNC: 83 U/L — SIGNIFICANT CHANGE UP (ref 30–115)
ALT FLD-CCNC: 15 U/L — SIGNIFICANT CHANGE UP (ref 0–41)
ANION GAP SERPL CALC-SCNC: 13 MMOL/L — SIGNIFICANT CHANGE UP (ref 7–14)
APPEARANCE UR: CLEAR — SIGNIFICANT CHANGE UP
APTT BLD: 36.6 SEC — SIGNIFICANT CHANGE UP (ref 27–39.2)
AST SERPL-CCNC: 15 U/L — SIGNIFICANT CHANGE UP (ref 0–41)
BACTERIA # UR AUTO: NEGATIVE — SIGNIFICANT CHANGE UP
BILIRUB SERPL-MCNC: 1.2 MG/DL — SIGNIFICANT CHANGE UP (ref 0.2–1.2)
BILIRUB UR-MCNC: NEGATIVE — SIGNIFICANT CHANGE UP
BLD GP AB SCN SERPL QL: SIGNIFICANT CHANGE UP
BUN SERPL-MCNC: 14 MG/DL — SIGNIFICANT CHANGE UP (ref 10–20)
CALCIUM SERPL-MCNC: 9.5 MG/DL — SIGNIFICANT CHANGE UP (ref 8.5–10.1)
CHLORIDE SERPL-SCNC: 102 MMOL/L — SIGNIFICANT CHANGE UP (ref 98–110)
CO2 SERPL-SCNC: 22 MMOL/L — SIGNIFICANT CHANGE UP (ref 17–32)
COLOR SPEC: SIGNIFICANT CHANGE UP
COVID-19 SPIKE DOMAIN AB INTERP: NEGATIVE — SIGNIFICANT CHANGE UP
COVID-19 SPIKE DOMAIN ANTIBODY RESULT: 0.4 U/ML — SIGNIFICANT CHANGE UP
CREAT SERPL-MCNC: 0.8 MG/DL — SIGNIFICANT CHANGE UP (ref 0.7–1.5)
DIFF PNL FLD: NEGATIVE — SIGNIFICANT CHANGE UP
EPI CELLS # UR: 1 /HPF — SIGNIFICANT CHANGE UP (ref 0–5)
GLUCOSE BLDC GLUCOMTR-MCNC: 149 MG/DL — HIGH (ref 70–99)
GLUCOSE BLDC GLUCOMTR-MCNC: 246 MG/DL — HIGH (ref 70–99)
GLUCOSE BLDC GLUCOMTR-MCNC: 279 MG/DL — HIGH (ref 70–99)
GLUCOSE BLDC GLUCOMTR-MCNC: 285 MG/DL — HIGH (ref 70–99)
GLUCOSE BLDC GLUCOMTR-MCNC: 379 MG/DL — HIGH (ref 70–99)
GLUCOSE SERPL-MCNC: 266 MG/DL — HIGH (ref 70–99)
GLUCOSE UR QL: ABNORMAL
HCT VFR BLD CALC: 48.2 % — SIGNIFICANT CHANGE UP (ref 42–52)
HGB BLD-MCNC: 16.2 G/DL — SIGNIFICANT CHANGE UP (ref 14–18)
HYALINE CASTS # UR AUTO: 4 /LPF — SIGNIFICANT CHANGE UP (ref 0–7)
INR BLD: 1.15 RATIO — SIGNIFICANT CHANGE UP (ref 0.65–1.3)
KETONES UR-MCNC: ABNORMAL
LEUKOCYTE ESTERASE UR-ACNC: ABNORMAL
MCHC RBC-ENTMCNC: 28.7 PG — SIGNIFICANT CHANGE UP (ref 27–31)
MCHC RBC-ENTMCNC: 33.6 G/DL — SIGNIFICANT CHANGE UP (ref 32–37)
MCV RBC AUTO: 85.3 FL — SIGNIFICANT CHANGE UP (ref 80–94)
NITRITE UR-MCNC: NEGATIVE — SIGNIFICANT CHANGE UP
NRBC # BLD: 0 /100 WBCS — SIGNIFICANT CHANGE UP (ref 0–0)
PH UR: 6.5 — SIGNIFICANT CHANGE UP (ref 5–8)
PLATELET # BLD AUTO: 232 K/UL — SIGNIFICANT CHANGE UP (ref 130–400)
POTASSIUM SERPL-MCNC: 3.9 MMOL/L — SIGNIFICANT CHANGE UP (ref 3.5–5)
POTASSIUM SERPL-SCNC: 3.9 MMOL/L — SIGNIFICANT CHANGE UP (ref 3.5–5)
PROT SERPL-MCNC: 7.2 G/DL — SIGNIFICANT CHANGE UP (ref 6–8)
PROT UR-MCNC: SIGNIFICANT CHANGE UP
PROTHROM AB SERPL-ACNC: 13.2 SEC — HIGH (ref 9.95–12.87)
RBC # BLD: 5.65 M/UL — SIGNIFICANT CHANGE UP (ref 4.7–6.1)
RBC # FLD: 12.5 % — SIGNIFICANT CHANGE UP (ref 11.5–14.5)
RBC CASTS # UR COMP ASSIST: 2 /HPF — SIGNIFICANT CHANGE UP (ref 0–4)
SARS-COV-2 IGG+IGM SERPL QL IA: 0.4 U/ML — SIGNIFICANT CHANGE UP
SARS-COV-2 IGG+IGM SERPL QL IA: NEGATIVE — SIGNIFICANT CHANGE UP
SODIUM SERPL-SCNC: 137 MMOL/L — SIGNIFICANT CHANGE UP (ref 135–146)
SP GR SPEC: 1.03 — HIGH (ref 1.01–1.03)
UROBILINOGEN FLD QL: SIGNIFICANT CHANGE UP
WBC # BLD: 9.08 K/UL — SIGNIFICANT CHANGE UP (ref 4.8–10.8)
WBC # FLD AUTO: 9.08 K/UL — SIGNIFICANT CHANGE UP (ref 4.8–10.8)
WBC UR QL: 10 /HPF — HIGH (ref 0–5)

## 2021-06-22 PROCEDURE — 99233 SBSQ HOSP IP/OBS HIGH 50: CPT

## 2021-06-22 PROCEDURE — 93010 ELECTROCARDIOGRAM REPORT: CPT

## 2021-06-22 RX ORDER — INSULIN LISPRO 100/ML
9 VIAL (ML) SUBCUTANEOUS
Refills: 0 | Status: DISCONTINUED | OUTPATIENT
Start: 2021-06-22 | End: 2021-06-24

## 2021-06-22 RX ORDER — INSULIN GLARGINE 100 [IU]/ML
25 INJECTION, SOLUTION SUBCUTANEOUS EVERY MORNING
Refills: 0 | Status: DISCONTINUED | OUTPATIENT
Start: 2021-06-22 | End: 2021-06-24

## 2021-06-22 RX ORDER — METOPROLOL TARTRATE 50 MG
25 TABLET ORAL
Refills: 0 | Status: DISCONTINUED | OUTPATIENT
Start: 2021-06-22 | End: 2021-06-24

## 2021-06-22 RX ORDER — LOSARTAN POTASSIUM 100 MG/1
25 TABLET, FILM COATED ORAL DAILY
Refills: 0 | Status: DISCONTINUED | OUTPATIENT
Start: 2021-06-22 | End: 2021-06-23

## 2021-06-22 RX ORDER — METOPROLOL TARTRATE 50 MG
12.5 TABLET ORAL ONCE
Refills: 0 | Status: COMPLETED | OUTPATIENT
Start: 2021-06-22 | End: 2021-06-22

## 2021-06-22 RX ORDER — NITROGLYCERIN 6.5 MG
0.4 CAPSULE, EXTENDED RELEASE ORAL
Refills: 0 | Status: DISCONTINUED | OUTPATIENT
Start: 2021-06-22 | End: 2021-06-24

## 2021-06-22 RX ORDER — POTASSIUM CHLORIDE 20 MEQ
20 PACKET (EA) ORAL ONCE
Refills: 0 | Status: COMPLETED | OUTPATIENT
Start: 2021-06-22 | End: 2021-06-22

## 2021-06-22 RX ADMIN — Medication 6: at 11:57

## 2021-06-22 RX ADMIN — Medication 20 MILLIEQUIVALENT(S): at 17:04

## 2021-06-22 RX ADMIN — Medication 0.4 MILLIGRAM(S): at 08:43

## 2021-06-22 RX ADMIN — Medication 25 MILLIGRAM(S): at 17:05

## 2021-06-22 RX ADMIN — TAMSULOSIN HYDROCHLORIDE 0.4 MILLIGRAM(S): 0.4 CAPSULE ORAL at 21:16

## 2021-06-22 RX ADMIN — Medication 9 UNIT(S): at 17:03

## 2021-06-22 RX ADMIN — HEPARIN SODIUM 5000 UNIT(S): 5000 INJECTION INTRAVENOUS; SUBCUTANEOUS at 21:16

## 2021-06-22 RX ADMIN — Medication 10: at 17:04

## 2021-06-22 RX ADMIN — LOSARTAN POTASSIUM 25 MILLIGRAM(S): 100 TABLET, FILM COATED ORAL at 11:27

## 2021-06-22 RX ADMIN — Medication 81 MILLIGRAM(S): at 11:28

## 2021-06-22 RX ADMIN — Medication 12.5 MILLIGRAM(S): at 11:27

## 2021-06-22 RX ADMIN — Medication 4 UNIT(S): at 11:56

## 2021-06-22 RX ADMIN — INSULIN GLARGINE 10 UNIT(S): 100 INJECTION, SOLUTION SUBCUTANEOUS at 09:21

## 2021-06-22 RX ADMIN — ATORVASTATIN CALCIUM 80 MILLIGRAM(S): 80 TABLET, FILM COATED ORAL at 21:16

## 2021-06-22 NOTE — PROGRESS NOTE ADULT - SUBJECTIVE AND OBJECTIVE BOX
Hospital Day:  2d    Subjective: Patient is a 60y old  Male who presents with a chief complaint of chest pain (22 Jun 2021 07:06)      Pt seen and evaluated at bedside.   Complaints: patient WANTS the CABG. Informed Dr. Farley and CT surgery PA   Over the night Events: none     Past Medical Hx:     Past Sx:    Allergies:  No Known Allergies    Current Meds:   Standng Meds:  aspirin  chewable 81 milliGRAM(s) Oral daily  atorvastatin 80 milliGRAM(s) Oral at bedtime  dextrose 40% Gel 15 Gram(s) Oral once  dextrose 5%. 1000 milliLiter(s) (50 mL/Hr) IV Continuous <Continuous>  dextrose 5%. 1000 milliLiter(s) (100 mL/Hr) IV Continuous <Continuous>  dextrose 50% Injectable 25 Gram(s) IV Push once  dextrose 50% Injectable 12.5 Gram(s) IV Push once  dextrose 50% Injectable 25 Gram(s) IV Push once  glucagon  Injectable 1 milliGRAM(s) IntraMuscular once  heparin   Injectable 5000 Unit(s) SubCutaneous every 8 hours  insulin glargine Injectable (LANTUS) 10 Unit(s) SubCutaneous every morning  insulin lispro (ADMELOG) corrective regimen sliding scale   SubCutaneous three times a day before meals  insulin lispro Injectable (ADMELOG) 4 Unit(s) SubCutaneous three times a day before meals  losartan 25 milliGRAM(s) Oral daily  metoprolol tartrate 25 milliGRAM(s) Oral two times a day  tamsulosin 0.4 milliGRAM(s) Oral at bedtime    PRN Meds:  nitroglycerin     SubLingual 0.4 milliGRAM(s) SubLingual every 5 minutes PRN Chest Pain      Vital Signs:   T(F): 98 (06-22-21 @ 05:16), Max: 98 (06-22-21 @ 05:16)  HR: 79 (06-22-21 @ 05:16) (79 - 87)  BP: 152/88 (06-22-21 @ 05:16) (140/81 - 178/102)  RR: 18 (06-22-21 @ 05:16) (18 - 18)  SpO2: 97% (06-21-21 @ 21:23) (97% - 97%)    Physical Exam:   GENERAL: NAD, Resting in bed  HEENT: NCAT  CHEST/LUNG: Clear to auscultation bilaterally; No wheezing or rubs.   HEART: Regular rate and rhythm; No murmurs, rubs, or gallops  ABDOMEN: Bowel sounds present; Soft, Nontender, Nondistended.   EXTREMITIES:  No clubbing, cyanosis, or edema  NERVOUS SYSTEM:  Alert & Oriented X3    FLUID BALANCE    06-20-21 @ 07:01  -  06-21-21 @ 07:00  --------------------------------------------------------  IN: 0 mL / OUT: 350 mL / NET: -350 mL    06-21-21 @ 07:01  -  06-22-21 @ 07:00  --------------------------------------------------------  IN: 0 mL / OUT: 400 mL / NET: -400 mL    06-22-21 @ 07:01  -  06-22-21 @ 11:52  --------------------------------------------------------  IN: 0 mL / OUT: 500 mL / NET: -500 mL        Labs:                         16.2   9.08  )-----------( 232      ( 22 Jun 2021 07:44 )             48.2       22 Jun 2021 07:44    137    |  102    |  14     ----------------------------<  266    3.9     |  22     |  0.8      Ca    9.5        22 Jun 2021 07:44  Mg     2.0       21 Jun 2021 05:41    TPro  7.2    /  Alb  4.7    /  TBili  1.2    /  DBili  x      /  AST  15     /  ALT  15     /  AlkPhos  83     22 Jun 2021 07:44       pTT    36.6             ----< 1.15 INR  (06-22-21 @ 07:44)    13.20        PT          Troponin 0.07, CKMB --, CK -- 06-21-21 @ 05:41  Troponin 0.02, CKMB --, CK -- 06-20-21 @ 08:24  Troponin <0.01, CKMB --, CK -- 06-20-21 @ 05:20                          Radiology:     CT chest:   No CT evidence of an active intrathoracic pathological process.  Normal caliber aorta with minimal arch and descending aortic calcifications. No ascending aortic calcifications.   Hospital Day:  2d    Subjective: Patient is a 60y old  Male who presents with a chief complaint of chest pain (22 Jun 2021 07:06)      Pt seen and evaluated at bedside.   Complaints: patient WANTS the CABG. Informed Dr. Farley and CT surgery PA   Over the night Events: none     Attending Note: Pt seen and examined at bedside. Pt complained of CP this am after he started to jog in f9. EKG done no acute changes. Pt given nitro with resolution. Pt now amenable to the surgery and wants work up to be done.     Past Medical Hx:     Past Sx:    Allergies:  No Known Allergies    Current Meds:   Standng Meds:  aspirin  chewable 81 milliGRAM(s) Oral daily  atorvastatin 80 milliGRAM(s) Oral at bedtime  dextrose 40% Gel 15 Gram(s) Oral once  dextrose 5%. 1000 milliLiter(s) (50 mL/Hr) IV Continuous <Continuous>  dextrose 5%. 1000 milliLiter(s) (100 mL/Hr) IV Continuous <Continuous>  dextrose 50% Injectable 25 Gram(s) IV Push once  dextrose 50% Injectable 12.5 Gram(s) IV Push once  dextrose 50% Injectable 25 Gram(s) IV Push once  glucagon  Injectable 1 milliGRAM(s) IntraMuscular once  heparin   Injectable 5000 Unit(s) SubCutaneous every 8 hours  insulin glargine Injectable (LANTUS) 10 Unit(s) SubCutaneous every morning  insulin lispro (ADMELOG) corrective regimen sliding scale   SubCutaneous three times a day before meals  insulin lispro Injectable (ADMELOG) 4 Unit(s) SubCutaneous three times a day before meals  losartan 25 milliGRAM(s) Oral daily  metoprolol tartrate 25 milliGRAM(s) Oral two times a day  tamsulosin 0.4 milliGRAM(s) Oral at bedtime    PRN Meds:  nitroglycerin     SubLingual 0.4 milliGRAM(s) SubLingual every 5 minutes PRN Chest Pain      Vital Signs:   T(F): 98 (06-22-21 @ 05:16), Max: 98 (06-22-21 @ 05:16)  HR: 79 (06-22-21 @ 05:16) (79 - 87)  BP: 152/88 (06-22-21 @ 05:16) (140/81 - 178/102)  RR: 18 (06-22-21 @ 05:16) (18 - 18)  SpO2: 97% (06-21-21 @ 21:23) (97% - 97%)    Physical Exam:   GENERAL: NAD, Resting in bed  HEENT: NCAT  CHEST/LUNG: Clear to auscultation bilaterally; No wheezing or rubs.   HEART: Regular rate and rhythm; No murmurs, rubs, or gallops  ABDOMEN: Bowel sounds present; Soft, Nontender, Nondistended.   EXTREMITIES:  No clubbing, cyanosis, or edema  NERVOUS SYSTEM:  Alert & Oriented X3    FLUID BALANCE    06-20-21 @ 07:01  -  06-21-21 @ 07:00  --------------------------------------------------------  IN: 0 mL / OUT: 350 mL / NET: -350 mL    06-21-21 @ 07:01  -  06-22-21 @ 07:00  --------------------------------------------------------  IN: 0 mL / OUT: 400 mL / NET: -400 mL    06-22-21 @ 07:01  -  06-22-21 @ 11:52  --------------------------------------------------------  IN: 0 mL / OUT: 500 mL / NET: -500 mL        Labs:                         16.2   9.08  )-----------( 232      ( 22 Jun 2021 07:44 )             48.2       22 Jun 2021 07:44    137    |  102    |  14     ----------------------------<  266    3.9     |  22     |  0.8      Ca    9.5        22 Jun 2021 07:44  Mg     2.0       21 Jun 2021 05:41    TPro  7.2    /  Alb  4.7    /  TBili  1.2    /  DBili  x      /  AST  15     /  ALT  15     /  AlkPhos  83     22 Jun 2021 07:44       pTT    36.6             ----< 1.15 INR  (06-22-21 @ 07:44)    13.20        PT          Troponin 0.07, CKMB --, CK -- 06-21-21 @ 05:41  Troponin 0.02, CKMB --, CK -- 06-20-21 @ 08:24  Troponin <0.01, CKMB --, CK -- 06-20-21 @ 05:20                          Radiology:     CT chest:   No CT evidence of an active intrathoracic pathological process.  Normal caliber aorta with minimal arch and descending aortic calcifications. No ascending aortic calcifications.

## 2021-06-22 NOTE — PROGRESS NOTE ADULT - ASSESSMENT
Uncontrolled DM  Severe 3 vessel disease  Preserved LV function    I feel CABG is the best option for him. The rational for surgery was discussed with him at bedside at length. He insists he would like a second opinion. Offered second opinion for Ct surgery from Dr. Dennison and  - he would like a doctor form another hospital to review his case and he wants to speak to his PMD first.    Recommend:    C/w ASA 81 mg.  High dose statin  Increase metoprolol to 25 mg q12  Add Losartan  Increase insulin (Lantus) for better control. Consider endocrine evaluation.  Still recommend CABG. If absolutely refusing, a possibility of high risk PCI with partial revascularization was discussed, however given the anatomy and comorbidites, as well as SYNTAX score, I feel his outcome would be better with surgery.

## 2021-06-22 NOTE — PROGRESS NOTE ADULT - SUBJECTIVE AND OBJECTIVE BOX
Patient is a 60y old  Male who presents with a chief complaint of chest pain (21 Jun 2021 16:03)    HPI:   60y M w/ hx of DM, HLD, BPH presenting with retrosternal chest pain that started last evening after dinner.. No radiation. 4/10 in severity, non exertional . Took a baby aspirin at home. Patient had episode of chest pain last week that resolved spontaneously, no previous chest pain or hx of cardiac disease Endorses mild SOB on exertion. Denies headache, N/V, abdominal pain, diarrhea, dysuria.    Was admitted for Obs,where he underwent CCTA that showed 3V disease, EKG with no ischemic changes, troponin <0.01--> 0.02, admitted for Cath in the AM, Loaded with aspirin in the ED  (21 Jun 2021 00:01)      SUBJ:  Patient seen and examined. No chest pain, feels "fine". He was evaluated by CT surgery and was offered CABG, but now he is refusing, states he wants a second opinion.      MEDICATIONS  (STANDING):  aspirin  chewable 81 milliGRAM(s) Oral daily  atorvastatin 80 milliGRAM(s) Oral at bedtime  dextrose 40% Gel 15 Gram(s) Oral once  dextrose 5%. 1000 milliLiter(s) (50 mL/Hr) IV Continuous <Continuous>  dextrose 5%. 1000 milliLiter(s) (100 mL/Hr) IV Continuous <Continuous>  dextrose 50% Injectable 25 Gram(s) IV Push once  dextrose 50% Injectable 12.5 Gram(s) IV Push once  dextrose 50% Injectable 25 Gram(s) IV Push once  glucagon  Injectable 1 milliGRAM(s) IntraMuscular once  heparin   Injectable 5000 Unit(s) SubCutaneous every 8 hours  insulin glargine Injectable (LANTUS) 10 Unit(s) SubCutaneous every morning  insulin lispro (ADMELOG) corrective regimen sliding scale   SubCutaneous three times a day before meals  insulin lispro Injectable (ADMELOG) 4 Unit(s) SubCutaneous three times a day before meals  metoprolol tartrate 12.5 milliGRAM(s) Oral every 12 hours  tamsulosin 0.4 milliGRAM(s) Oral at bedtime    MEDICATIONS  (PRN):  nitroglycerin     SubLingual 0.4 milliGRAM(s) SubLingual every 5 minutes PRN Chest Pain            Vital Signs Last 24 Hrs  T(C): 36.7 (22 Jun 2021 05:16), Max: 36.7 (22 Jun 2021 05:16)  T(F): 98 (22 Jun 2021 05:16), Max: 98 (22 Jun 2021 05:16)  HR: 79 (22 Jun 2021 05:16) (79 - 87)  BP: 152/88 (22 Jun 2021 05:16) (140/81 - 178/102)  BP(mean): --  RR: 18 (22 Jun 2021 05:16) (18 - 18)  SpO2: 97% (21 Jun 2021 21:23) (97% - 97%)      PHYSICAL EXAM:    GEN: AAO x 3, NAD  HEENT: NC/AT, PERRL  Neck: No JVD, no bruits  CV: Reg, S1-S2, no murmur  Lungs: CTAB  Abd: Soft, non-tender  Ext: No edema, no hematoma.        06-21-21 @ 07:01  -  06-22-21 @ 07:00  --------------------------------------------------------  IN: 0 mL / OUT: 400 mL / NET: -400 mL        I&O's Summary    21 Jun 2021 07:01  -  22 Jun 2021 07:00  --------------------------------------------------------  IN: 0 mL / OUT: 400 mL / NET: -400 mL    	    TELEMETRY: NSR    ECG:  < from: 12 Lead ECG (06.20.21 @ 04:48) >  Sinus rhythm with occasional Premature ventricular complexes  Possible Left atrial enlargement  Nonspecific ST abnormality  Prolonged QT  Abnormal ECG    < end of copied text >    TTE:  < from: TTE Echo Complete w/o Contrast w/ Doppler (06.21.21 @ 09:12) >  Summary:   1. LV Ejection Fraction by Scales's Method with a biplane EF of 62 %.   2. Normal left ventricular size and wall thicknesses, with normal systolic and diastolic function.   3. Mild dilatation of the ascending aorta (4.0 cm).    PHYSICIAN INTERPRETATION:  Left Ventricle: Normal left ventricular size and wall thicknesses, with normal systolic and diastolic function.  Right Ventricle: Normal right ventricular size and function.  Left Atrium: Normal left atrial size.  Right Atrium: Normal right atrial size.  Pericardium: There is no evidence of pericardial effusion.  Mitral Valve: The mitral valve is normal in structure. No evidence of mitral stenosis. No mitral regurgitation.  Tricuspid Valve: The tricuspid valve is normal in structure. No tricuspid regurgitation visualized.  Aortic Valve: The aortic valve is trileaflet. No evidence of aortic stenosis. Peak transaortic gradient equals 5.5 mmHg, mean transaortic gradient equals 3.4 mmHg, the calculated aortic valve area equals 3.30 cm² by the continuity equation consistent with normally opening aortic valve. No aortic regurgitation.  Pulmonic Valve: The pulmonic valve is normal. No pulmonic regurgitation.  Aorta: There is dilatation of the ascending aorta.  Pulmonary Artery: The main pulmonary artery is normal in size.  Venous: The inferior vena cava was normal sized, with respiratory size variation greater than 50%.    < end of copied text >      LABS:                        14.9   8.32  )-----------( 229      ( 21 Jun 2021 05:41 )             44.2     06-21    139  |  102  |  12  ----------------------------<  254<H>  4.3   |  27  |  0.8    Ca    9.3      21 Jun 2021 05:41  Mg     2.0     06-21    TPro  6.6  /  Alb  4.4  /  TBili  0.9  /  DBili  x   /  AST  15  /  ALT  16  /  AlkPhos  80  06-21    CARDIAC MARKERS ( 21 Jun 2021 05:41 )  x     / 0.07 ng/mL / x     / x     / x      CARDIAC MARKERS ( 20 Jun 2021 08:24 )  x     / 0.02 ng/mL / x     / x     / x                BNP  RADIOLOGY & ADDITIONAL STUDIES:      IMPRESSION AND PLAN:

## 2021-06-22 NOTE — PROGRESS NOTE ADULT - ASSESSMENT
60y M w/ hx of DM, HLD, BPH presenting with atypical chest pain, found to have 3V disease on CCTA. Patient s/p cath now is undergoing CABG eval.     #Atypical chest pain - likely NSTEMI  #new 3 vessel CAD   - CCTA 6/20: 3v disease, calcium score 638     - prox-mid LAD severe narrowing     - mid-distal LCX severe narrowing     - prox RCA severe narrowing   - trop: 0.01 > 0.02 > 0.07  - repeat ekg this am stable   - TTE 6/21: EF 62%, normal systolic/diastolic function; mild dilation of ascending aorta 4cm   - Loaded with aspirin, c/w aspirin; c/w atorvastatin 80mg hs   - No Plavix as may need CABG this admission  - Cath 6/21: 3 vessel disease as above  - informed cardio attending and CT surgery PA that patient WANTS to proceed with CABG   - CT surgery on board: thyroid profile, PFTs, carotid duplex, CT chest, u/a   - started lopressor 25mg bid, started losartan 25mg daily      #T2DM uncontrolled w/ possible diabetic neuropathy  - patient reports tingling   -  takes metformin 500mg BID at home  - A1c 6/21/2021: 12   - hold home meds, monitor fs, adjust insulin as needed    #likely new HTN   - started losartan 25mg daily     #DLD: c/w statin but increase from 20 to 80 ( high intensity), lipid profile: ,   #BPH: c/w alfuzosin     Diet DASH/TLC  GI ppx: none   DVT PPX: hep subq   GI ppx not indicated   Dispo from home; CABG eval; remain on tele as per Dr. Lo   Code status: FULL CODE      60y M w/ hx of DM, HLD, BPH presenting with atypical chest pain, found to have 3V disease on CCTA. Patient s/p cath now is undergoing CABG eval.     #Atypical chest pain - likely NSTEMI  #new 3 vessel CAD   - CCTA 6/20: 3v disease, calcium score 638     - prox-mid LAD severe narrowing     - mid-distal LCX severe narrowing     - prox RCA severe narrowing   - trop: 0.01 > 0.02 > 0.07  - repeat ekg this am stable   - TTE 6/21: EF 62%, normal systolic/diastolic function; mild dilation of ascending aorta 4cm   - Loaded with aspirin, c/w aspirin; c/w atorvastatin 80mg hs   - No Plavix as may need CABG this admission  - Cath 6/21: 3 vessel disease as above  - informed cardio attending and CT surgery PA that patient WANTS to proceed with CABG   - CT surgery on board: thyroid profile, PFTs, carotid duplex, CT chest, u/a   - started lopressor 25mg bid, started losartan 25mg daily      Attending note: Pt amenable to CABG now. Spoke to attending Dr. Tovar and approved transfer to CTS service once pt gets a bed on 3C. Spoke to bed management and bed will be available today. Transfer orders placed and transfer note to be done.      #T2DM uncontrolled w/ possible diabetic neuropathy  - patient reports tingling   -  takes metformin 500mg BID at home  - A1c 6/21/2021: 12   - hold home meds, monitor fs, adjust insulin as needed    #likely new HTN   - started losartan 25mg daily     #DLD: c/w statin but increase from 20 to 80 ( high intensity), lipid profile: ,   #BPH: c/w alfuzosin     Diet DASH/TLC  GI ppx: none   DVT PPX: hep subq   GI ppx not indicated   Dispo from home; CABG eval; remain on tele as per Dr. Lo   Code status: FULL CODE

## 2021-06-22 NOTE — PROGRESS NOTE ADULT - SUBJECTIVE AND OBJECTIVE BOX
SURGEON(s): Guy  SUBJECTIVE ASSESSMENT:60yMale patient seen and examined at bedside.    Vital Signs Last 24 Hrs  T(F): 98.3 (2021 13:23), Max: 98.3 (2021 13:23)  HR: 78 (2021 13:23) (78 - 87)  BP: 135/79 (2021 13:23) (135/79 - 178/102)  RR: 18 (2021 13:23) (18 - 18)  SpO2: 97% (2021 21:23) (97% - 97%)    I&O's Detail    2021 07:01  -  2021 07:00  --------------------------------------------------------  IN:  Total IN: 0 mL    OUT:    Voided (mL): 400 mL  Total OUT: 400 mL    Net: I&O's Detail    2021 07:01  -  2021 07:00  --------------------------------------------------------  Total NET: -350 mL      2021 07:01  -  2021 07:00  --------------------------------------------------------  Total NET: -400 mL    CAPILLARY BLOOD GLUCOSE  POCT Blood Glucose.: 285 mg/dL (2021 11:51)  POCT Blood Glucose.: 246 mg/dL (2021 09:19)  POCT Blood Glucose.: 279 mg/dL (2021 07:44)  POCT Blood Glucose.: 245 mg/dL (2021 17:37)    Physical Exam:  General: NAD; A&Ox3  Cardiac: S1/S2, RRR, no murmur, no rubs  Lungs: unlabored respirations, CTA b/l, no wheeze, no rales, no crackles  Abdomen: Soft/NT/ND; positive bowel sounds x 4  Sternum: Intact, no click, incision healing well with no drainage  Incisions: Incisions clean/dry/intact  Extremities: No edema b/l lower extremities; good capillary refill; no cyanosis; palpable 1+ pedal pulses b/l    LABS:                        16.2   9.08  )-----------( 232      ( 2021 07:44 )             48.2                         14.9   8.32  )-----------( 229      ( 2021 05:41 )             44.2     06-    137  |  102  |  14  ----------------------------<  266<H>  3.9   |  22  |  0.8  06-    139  |  102  |  12  ----------------------------<  254<H>  4.3   |  27  |  0.8    Ca    9.5      2021 07:44  Mg     2.0     06-    TPro  7.2 [6.0 - 8.0]  /  Alb  4.7 [3.5 - 5.2]  /  TBili  1.2 [0.2 - 1.2]  /  DBili  x   /  AST  15 [0 - 41]  /  ALT  15 [0 - 41]  /  AlkPhos  83 [30 - 115]  06-    PT/INR - ( 2021 07:44 )   PT: ;   INR: 1.15 ratio        PTT - ( 2021 07:44 )  PTT:36.6 sec    RADIOLOGY & ADDITIONAL TESTS:  CXR:   PROCEDURE DATE:  2021    INTERPRETATION:  CLINICAL HISTORY: Chest Pain.  COMPARISON: None.  TECHNIQUE: Frontal and lateral chest radiographs. Adequate positioning.  FINDINGS: Support devices: None.  Cardiac/mediastinum/hilum: Unremarkable.  Lung parenchyma/Pleura: No focal parenchymal opacities, pleural effusions, or pneumothorax.  Skeleton/soft tissues: Unremarkable  IMPRESSION: No radiographic evidence of acute cardiopulmonary disease.    EK21  Ventricular Rate 98 BPM  Atrial Rate 98 BPM  P-R Interval 162 ms  QRS Duration 100 ms  Q-T Interval 380 ms  QTC Calculation(Bazett) 485 ms  P Axis 58 degrees  R Axis 18 degrees  T Axis 68 degrees  Diagnosis Line Sinus rhythm with occasional Premature ventricular complexes  Possible Left atrial enlargement  Nonspecific ST abnormality  Prolonged QT  Abnormal ECG    CHEST CT:   INTERPRETATION:  Reason for Exam:  Preoperative evaluation: CABG.  Technique: CT of the chest was performed without intravenous contrast. Multiplanar reformatted images provided. MIPS provided.  Comparison: Cardiac CTA, 2021.  Findings: Tubes/Lines: None.  Mediastinum/Lymph Nodes:  Symmetric sized thyroid gland. No lymphadenopathy by size criteria.< from: TTE Echo Complete w/o Contrast w/ Doppler (21 @ 09:12) >   1. LV Ejection Fraction by Scales's Method with a biplane EF of 62 %.   2. Normal left ventricular size and wall thicknesses, with normal systolic and diastolic function.   3. Mild dilatation of the ascending aorta (4.0 cm).    ECHO:   Heart/Great Vessels: Visually the heart is without enlargement.Multivessel coronary arterial calcifications. No pericardial effusion. Normal caliber aorta. No ascending aortic calcifications. Minimal arch and descending arch calcifications. Normal anatomy left-sided arch with normal branching pattern arch vessels.  Abdomen: Limited evaluation based on technique. 2 cm low-density right adrenal nodule (less than 10 Hounsfield units), likely an adenoma.  Lungs, Pleura, and Airways: Central airways are patent. No mass consolidation. No pneumothorax pleural effusion.  Bones and soft tissues:  No acute osseous abnormality.  IMPRESSION: No CT evidence of an active intrathoracic pathological process.  Normal caliber aorta with minimal arch and descending aortic calcifications. No ascending aortic calcifications.      Allergies    No Known Allergies    Intolerances    MEDICATIONS  (STANDING):  aspirin  chewable 81 milliGRAM(s) Oral daily  atorvastatin 80 milliGRAM(s) Oral at bedtime  dextrose 40% Gel 15 Gram(s) Oral once  dextrose 5%. 1000 milliLiter(s) (50 mL/Hr) IV Continuous <Continuous>  dextrose 5%. 1000 milliLiter(s) (100 mL/Hr) IV Continuous <Continuous>  dextrose 50% Injectable 25 Gram(s) IV Push once  dextrose 50% Injectable 12.5 Gram(s) IV Push once  dextrose 50% Injectable 25 Gram(s) IV Push once  glucagon  Injectable 1 milliGRAM(s) IntraMuscular once  heparin   Injectable 5000 Unit(s) SubCutaneous every 8 hours  insulin glargine Injectable (LANTUS) 25 Unit(s) SubCutaneous every morning  insulin lispro (ADMELOG) corrective regimen sliding scale   SubCutaneous three times a day before meals  insulin lispro Injectable (ADMELOG) 9 Unit(s) SubCutaneous three times a day before meals  losartan 25 milliGRAM(s) Oral daily  metoprolol tartrate 25 milliGRAM(s) Oral two times a day  potassium chloride    Tablet ER 20 milliEquivalent(s) Oral once  tamsulosin 0.4 milliGRAM(s) Oral at bedtime    MEDICATIONS  (PRN):  nitroglycerin     SubLingual 0.4 milliGRAM(s) SubLingual every 5 minutes PRN Chest Pain    Home Medications:  alfuzosin 10 mg oral tablet, extended release: 1 tab(s) orally once a day (2021 01:57)  atorvastatin 20 mg oral tablet: 1 tab(s) orally once a day (2021 01:57)  glipiZIDE 2.5 mg oral tablet, extended release: 1 tab(s) orally once a day (2021 01:57)  metFORMIN 500 mg oral tablet, extended release: 1 tab(s) orally 2 times a day (2021 01:57)      Assessment/Plan:  60y Male pre-op for CABG on 21  - Case and plan discussed with CTU Intensivist and CT Surgeon - Dr. Wilkins/Jesi/Guy  - Covid swab to be done tomorrow 21  - follow up PFTs   - continue insulin for glycemic control

## 2021-06-22 NOTE — CHART NOTE - NSCHARTNOTEFT_GEN_A_CORE
Transfer Note    Transfer from: F9  Transfer to: 3C  Service: CT surgery service  Accepting Physician: Dr. Tovar     HPI:   60y M w/ hx of DM, HLD, BPH presenting with retrosternal chest pain that started last evening after dinner.. No radiation. 4/10 in severity, non exertional . Took a baby aspirin at home. Patient had episode of chest pain last week that resolved spontaneously, no previous chest pain or hx of cardiac disease Endorses mild SOB on exertion. Denies headache, N/V, abdominal pain, diarrhea, dysuria.    Was admitted for Obs,where he underwent CCTA that showed 3V disease, EKG with no ischemic changes, troponin <0.01--> 0.02, admitted for Cath in the AM, Loaded with aspirin in the ED     Hospital course:   Patient had CCTA which showed 3 vessel disease which was confirmed on cath. Patient wants to proceed with CABG. Lipitor was increased to high dose and patient was found to have severely uncontrolled diabetes (a1c 12). Also started lopressor and losartan    Assessment and plan as per progress note.     Follow up:   - CABG work up   - increased insulin regimen, follow up fingersticks

## 2021-06-23 LAB
ALBUMIN SERPL ELPH-MCNC: 4.5 G/DL — SIGNIFICANT CHANGE UP (ref 3.5–5.2)
ALP SERPL-CCNC: 78 U/L — SIGNIFICANT CHANGE UP (ref 30–115)
ALT FLD-CCNC: 14 U/L — SIGNIFICANT CHANGE UP (ref 0–41)
ANION GAP SERPL CALC-SCNC: 8 MMOL/L — SIGNIFICANT CHANGE UP (ref 7–14)
AST SERPL-CCNC: 16 U/L — SIGNIFICANT CHANGE UP (ref 0–41)
BASOPHILS # BLD AUTO: 0.02 K/UL — SIGNIFICANT CHANGE UP (ref 0–0.2)
BASOPHILS NFR BLD AUTO: 0.2 % — SIGNIFICANT CHANGE UP (ref 0–1)
BILIRUB SERPL-MCNC: 1.2 MG/DL — SIGNIFICANT CHANGE UP (ref 0.2–1.2)
BUN SERPL-MCNC: 16 MG/DL — SIGNIFICANT CHANGE UP (ref 10–20)
CALCIUM SERPL-MCNC: 9.7 MG/DL — SIGNIFICANT CHANGE UP (ref 8.5–10.1)
CHLORIDE SERPL-SCNC: 103 MMOL/L — SIGNIFICANT CHANGE UP (ref 98–110)
CO2 SERPL-SCNC: 27 MMOL/L — SIGNIFICANT CHANGE UP (ref 17–32)
CREAT SERPL-MCNC: 1 MG/DL — SIGNIFICANT CHANGE UP (ref 0.7–1.5)
EOSINOPHIL # BLD AUTO: 0.07 K/UL — SIGNIFICANT CHANGE UP (ref 0–0.7)
EOSINOPHIL NFR BLD AUTO: 0.8 % — SIGNIFICANT CHANGE UP (ref 0–8)
GLUCOSE BLDC GLUCOMTR-MCNC: 108 MG/DL — HIGH (ref 70–99)
GLUCOSE BLDC GLUCOMTR-MCNC: 118 MG/DL — HIGH (ref 70–99)
GLUCOSE BLDC GLUCOMTR-MCNC: 221 MG/DL — HIGH (ref 70–99)
GLUCOSE BLDC GLUCOMTR-MCNC: 276 MG/DL — HIGH (ref 70–99)
GLUCOSE SERPL-MCNC: 214 MG/DL — HIGH (ref 70–99)
HCT VFR BLD CALC: 45.9 % — SIGNIFICANT CHANGE UP (ref 42–52)
HGB BLD-MCNC: 15.3 G/DL — SIGNIFICANT CHANGE UP (ref 14–18)
IMM GRANULOCYTES NFR BLD AUTO: 0.3 % — SIGNIFICANT CHANGE UP (ref 0.1–0.3)
INR BLD: 1.18 RATIO — SIGNIFICANT CHANGE UP (ref 0.65–1.3)
LYMPHOCYTES # BLD AUTO: 1.99 K/UL — SIGNIFICANT CHANGE UP (ref 1.2–3.4)
LYMPHOCYTES # BLD AUTO: 22.6 % — SIGNIFICANT CHANGE UP (ref 20.5–51.1)
MAGNESIUM SERPL-MCNC: 2 MG/DL — SIGNIFICANT CHANGE UP (ref 1.8–2.4)
MCHC RBC-ENTMCNC: 28.5 PG — SIGNIFICANT CHANGE UP (ref 27–31)
MCHC RBC-ENTMCNC: 33.3 G/DL — SIGNIFICANT CHANGE UP (ref 32–37)
MCV RBC AUTO: 85.6 FL — SIGNIFICANT CHANGE UP (ref 80–94)
MONOCYTES # BLD AUTO: 0.79 K/UL — HIGH (ref 0.1–0.6)
MONOCYTES NFR BLD AUTO: 9 % — SIGNIFICANT CHANGE UP (ref 1.7–9.3)
NEUTROPHILS # BLD AUTO: 5.92 K/UL — SIGNIFICANT CHANGE UP (ref 1.4–6.5)
NEUTROPHILS NFR BLD AUTO: 67.1 % — SIGNIFICANT CHANGE UP (ref 42.2–75.2)
NRBC # BLD: 0 /100 WBCS — SIGNIFICANT CHANGE UP (ref 0–0)
PLATELET # BLD AUTO: 243 K/UL — SIGNIFICANT CHANGE UP (ref 130–400)
POTASSIUM SERPL-MCNC: 4.8 MMOL/L — SIGNIFICANT CHANGE UP (ref 3.5–5)
POTASSIUM SERPL-SCNC: 4.8 MMOL/L — SIGNIFICANT CHANGE UP (ref 3.5–5)
PROT SERPL-MCNC: 6.7 G/DL — SIGNIFICANT CHANGE UP (ref 6–8)
PROTHROM AB SERPL-ACNC: 13.6 SEC — HIGH (ref 9.95–12.87)
RBC # BLD: 5.36 M/UL — SIGNIFICANT CHANGE UP (ref 4.7–6.1)
RBC # FLD: 12.6 % — SIGNIFICANT CHANGE UP (ref 11.5–14.5)
SARS-COV-2 RNA SPEC QL NAA+PROBE: SIGNIFICANT CHANGE UP
SODIUM SERPL-SCNC: 138 MMOL/L — SIGNIFICANT CHANGE UP (ref 135–146)
T3 SERPL-MCNC: 110 NG/DL — SIGNIFICANT CHANGE UP (ref 80–200)
T4 AB SER-ACNC: 7.8 UG/DL — SIGNIFICANT CHANGE UP (ref 4.6–12)
TSH SERPL-MCNC: 1.45 UIU/ML — SIGNIFICANT CHANGE UP (ref 0.27–4.2)
WBC # BLD: 8.82 K/UL — SIGNIFICANT CHANGE UP (ref 4.8–10.8)
WBC # FLD AUTO: 8.82 K/UL — SIGNIFICANT CHANGE UP (ref 4.8–10.8)

## 2021-06-23 PROCEDURE — 99232 SBSQ HOSP IP/OBS MODERATE 35: CPT

## 2021-06-23 PROCEDURE — 99232 SBSQ HOSP IP/OBS MODERATE 35: CPT | Mod: 57

## 2021-06-23 RX ORDER — CHLORHEXIDINE GLUCONATE 213 G/1000ML
1 SOLUTION TOPICAL ONCE
Refills: 0 | Status: COMPLETED | OUTPATIENT
Start: 2021-06-23 | End: 2021-06-23

## 2021-06-23 RX ORDER — CHLORHEXIDINE GLUCONATE 213 G/1000ML
30 SOLUTION TOPICAL ONCE
Refills: 0 | Status: COMPLETED | OUTPATIENT
Start: 2021-06-23 | End: 2021-06-24

## 2021-06-23 RX ADMIN — Medication 9 UNIT(S): at 08:14

## 2021-06-23 RX ADMIN — HEPARIN SODIUM 5000 UNIT(S): 5000 INJECTION INTRAVENOUS; SUBCUTANEOUS at 05:40

## 2021-06-23 RX ADMIN — Medication 6: at 11:55

## 2021-06-23 RX ADMIN — ATORVASTATIN CALCIUM 80 MILLIGRAM(S): 80 TABLET, FILM COATED ORAL at 21:42

## 2021-06-23 RX ADMIN — Medication 4: at 08:14

## 2021-06-23 RX ADMIN — Medication 25 MILLIGRAM(S): at 21:42

## 2021-06-23 RX ADMIN — INSULIN GLARGINE 25 UNIT(S): 100 INJECTION, SOLUTION SUBCUTANEOUS at 08:13

## 2021-06-23 RX ADMIN — TAMSULOSIN HYDROCHLORIDE 0.4 MILLIGRAM(S): 0.4 CAPSULE ORAL at 21:47

## 2021-06-23 RX ADMIN — Medication 9 UNIT(S): at 11:54

## 2021-06-23 RX ADMIN — Medication 25 MILLIGRAM(S): at 05:40

## 2021-06-23 RX ADMIN — Medication 81 MILLIGRAM(S): at 11:55

## 2021-06-23 RX ADMIN — Medication 9 UNIT(S): at 16:45

## 2021-06-23 RX ADMIN — LOSARTAN POTASSIUM 25 MILLIGRAM(S): 100 TABLET, FILM COATED ORAL at 05:40

## 2021-06-23 RX ADMIN — CHLORHEXIDINE GLUCONATE 1 APPLICATION(S): 213 SOLUTION TOPICAL at 21:48

## 2021-06-23 RX ADMIN — HEPARIN SODIUM 5000 UNIT(S): 5000 INJECTION INTRAVENOUS; SUBCUTANEOUS at 13:05

## 2021-06-23 NOTE — PROGRESS NOTE ADULT - SUBJECTIVE AND OBJECTIVE BOX
Patient is a 60y old  Male who presents with a chief complaint of chest pain (23 Jun 2021 08:33)    HPI:   60y M w/ hx of DM, HLD, BPH presenting with retrosternal chest pain that started last evening after dinner.. No radiation. 4/10 in severity, non exertional . Took a baby aspirin at home. Patient had episode of chest pain last week that resolved spontaneously, no previous chest pain or hx of cardiac disease Endorses mild SOB on exertion. Denies headache, N/V, abdominal pain, diarrhea, dysuria.    Was admitted for Obs,where he underwent CCTA that showed 3V disease, EKG with no ischemic changes, troponin <0.01--> 0.02, admitted for Cath in the AM, Loaded with aspirin in the ED  (21 Jun 2021 00:01)      SUBJ:  Patient seen and examined. Resting comfortably. No hematoma.      MEDICATIONS  (STANDING):  aspirin  chewable 81 milliGRAM(s) Oral daily  atorvastatin 80 milliGRAM(s) Oral at bedtime  dextrose 40% Gel 15 Gram(s) Oral once  dextrose 5%. 1000 milliLiter(s) (50 mL/Hr) IV Continuous <Continuous>  dextrose 5%. 1000 milliLiter(s) (100 mL/Hr) IV Continuous <Continuous>  dextrose 50% Injectable 25 Gram(s) IV Push once  dextrose 50% Injectable 12.5 Gram(s) IV Push once  dextrose 50% Injectable 25 Gram(s) IV Push once  glucagon  Injectable 1 milliGRAM(s) IntraMuscular once  heparin   Injectable 5000 Unit(s) SubCutaneous every 8 hours  insulin glargine Injectable (LANTUS) 25 Unit(s) SubCutaneous every morning  insulin lispro (ADMELOG) corrective regimen sliding scale   SubCutaneous three times a day before meals  insulin lispro Injectable (ADMELOG) 9 Unit(s) SubCutaneous three times a day before meals  losartan 25 milliGRAM(s) Oral daily  metoprolol tartrate 25 milliGRAM(s) Oral two times a day  tamsulosin 0.4 milliGRAM(s) Oral at bedtime    MEDICATIONS  (PRN):  nitroglycerin     SubLingual 0.4 milliGRAM(s) SubLingual every 5 minutes PRN Chest Pain            Vital Signs Last 24 Hrs  T(C): 36.2 (23 Jun 2021 05:55), Max: 36.8 (22 Jun 2021 13:23)  T(F): 97.2 (23 Jun 2021 05:55), Max: 98.3 (22 Jun 2021 13:23)  HR: 75 (23 Jun 2021 05:55) (74 - 90)  BP: 120/69 (23 Jun 2021 05:55) (111/69 - 135/79)  BP(mean): --  RR: 18 (23 Jun 2021 05:55) (18 - 18)  SpO2: 97% (23 Jun 2021 08:00) (97% - 97%)      PHYSICAL EXAM:    GEN: NAD  HEENT: NC/AT  Neck: No JVD  CV: Reg, S1-S2  Lungs: CTAB  Ext: No edema, no hematoma        06-22-21 @ 07:01  -  06-23-21 @ 07:00  --------------------------------------------------------  IN: 356 mL / OUT: 1300 mL / NET: -944 mL        I&O's Summary    22 Jun 2021 07:01  -  23 Jun 2021 07:00  --------------------------------------------------------  IN: 356 mL / OUT: 1300 mL / NET: -944 mL    	    TELEMETRY: NSR    ECG:  < from: 12 Lead ECG (06.20.21 @ 04:48) >  Sinus rhythm with occasional Premature ventricular complexes  Possible Left atrial enlargement  Nonspecific ST abnormality  Prolonged QT  Abnormal ECG    < end of copied text >    TTE:    Summary:   1. LV Ejection Fraction by Scales's Method with a biplane EF of 62 %.   2. Normal left ventricular size and wall thicknesses, with normal systolic and diastolic function.   3. Mild dilatation of the ascending aorta (4.0 cm).    PHYSICIAN INTERPRETATION:  Left Ventricle: Normal left ventricular size and wall thicknesses, with normal systolic and diastolic function.  Right Ventricle: Normal right ventricular size and function.  Left Atrium: Normal left atrial size.  Right Atrium: Normal right atrial size.  Pericardium: There is no evidence of pericardial effusion.  Mitral Valve: The mitral valve is normal in structure. No evidence of mitral stenosis. No mitral regurgitation.  Tricuspid Valve: The tricuspid valve is normal in structure. No tricuspid regurgitation visualized.  Aortic Valve: The aortic valve is trileaflet. No evidence of aortic stenosis. Peak transaortic gradient equals 5.5 mmHg, mean transaortic gradient equals 3.4 mmHg, the calculated aortic valve area equals 3.30 cm² by the continuity equation consistent with normally opening aortic valve. No aortic regurgitation.  Pulmonic Valve: The pulmonic valve is normal. No pulmonic regurgitation.  Aorta: There is dilatation of the ascending aorta.  Pulmonary Artery: The main pulmonary artery is normal in size.  Venous: The inferior vena cava was normal sized, with respiratory size variation greater than 50%.    CATH:    < from: Cardiac Cath Lab - Adult (06.21.21 @ 13:59) >  CORONARY CIRCULATION: The coronary circulation is right dominant. There was    3-vessel coronary artery disease (LAD, RCA, and circumflex). Distal left    main: Angiography showed mild atherosclerosis Ostial LAD: There was a 85 %    stenosis at a site with no prior intervention. Proximal LAD: There was a    diffuse 80 % stenosis. Mid LAD: There was a diffuse 90 % stenosis. Distal    LAD: Angiography showed moderate atherosclerosis 1st diagonal: There was a    80 % stenosis at the ostium of the vessel segment. Proximal circumflex:    Angiography showed mild atherosclerosis Distal circumflex: There was a 80    % stenosis. 1st obtuse marginal: There was a 80 % stenosis at the ostium    of the vessel segment. 2nd obtuse marginal: The vessel was small sized.    There was a 90 % stenosis. Ramus intermedius: The vessel was large sized.    Angiography showed mild atherosclerosis with no flow limiting lesions.    Proximal RCA: There was a 80 % stenosis. Mid RCA: There was a diffuse 99 %    stenosis at a site with no prior intervention. Distal RCA: There was a 80    % stenosis. Right PDA: Patent CLAUDIA 2 flow  < from: CT Angio Heart and Coronaries w/ IV Cont (06.20.21 @ 14:51) >  IMPRESSION:    Mixed plaque at the distal left main coronary artery/bifurcation resulting in approximately moderate narrowing.    Extensive mixed calcified and noncalcified plaque particularly within the proximal and mid LAD suspicious for underlying severe stenosis as described above.Calcified plaque within small caliber septal and diagonal branches as described above.    Mixed plaque within the LCx origin resulting in approximately mild to moderate narrowing. Focal calcified plaque within the LCx is proximal to the takeoff of the second obtuse marginal branch resulting mild narrowing. One segment of severe narrowing within the mid/distal LCx after the takeoff of the second obtuse marginal branch. Moderate narrowing of the takeoff of the second obtuse marginal branch. Two segments of severe narrowing within the third obtuse marginal branch .    Mixed plaque within the proximal RCA resulting in severe narrowing/occlusion. Two segments of severe/occlusion within the mid RCA. Mixed plaque resulting in mild narrowing within the distal RCA.    The total Agatston coronary artery calcium score equals 638, which corresponds to 99th percentile for age, gender and ethnicity.    CAD-RADS 4A vs 4B.    < end of copied text >           IMPRESSIONS: There is significant triple vessel coronary artery disease.    SYNTAX 37    < end of copied text >    CCTA:    < from: CT Angio Heart and Coronaries w/ IV Cont (06.20.21 @ 14:51) >  IMPRESSION:    Mixed plaque at the distal left main coronary artery/bifurcation resulting in approximately moderate narrowing.    Extensive mixed calcified and noncalcified plaque particularly within the proximal and mid LAD suspicious for underlying severe stenosis as described above.Calcified plaque within small caliber septal and diagonal branches as described above.    Mixed plaque within the LCx origin resulting in approximately mild to moderate narrowing. Focal calcified plaque within the LCx is proximal to the takeoff of the second obtuse marginal branch resulting mild narrowing. One segment of severe narrowing within the mid/distal LCx after the takeoff of the second obtuse marginal branch. Moderate narrowing of the takeoff of the second obtuse marginal branch. Two segments of severe narrowing within the third obtuse marginal branch .    Mixed plaque within the proximal RCA resulting in severe narrowing/occlusion. Two segments of severe/occlusion within the mid RCA. Mixed plaque resulting in mild narrowing within the distal RCA.    The total Agatston coronary artery calcium score equals 638, which corresponds to 99th percentile for age, gender and ethnicity.    CAD-RADS 4A vs 4B.      < end of copied text >        LABS:                        15.3   8.82  )-----------( 243      ( 23 Jun 2021 05:43 )             45.9     06-23    138  |  103  |  16  ----------------------------<  214<H>  4.8   |  27  |  1.0    Ca    9.7      23 Jun 2021 05:43  Mg     2.0     06-23    TPro  6.7  /  Alb  4.5  /  TBili  1.2  /  DBili  x   /  AST  16  /  ALT  14  /  AlkPhos  78  06-23        PT/INR - ( 23 Jun 2021 05:43 )   PT: 13.60 sec;   INR: 1.18 ratio         PTT - ( 22 Jun 2021 07:44 )  PTT:36.6 sec      BNP  RADIOLOGY & ADDITIONAL STUDIES:      IMPRESSION AND PLAN:

## 2021-06-23 NOTE — PROGRESS NOTE ADULT - ASSESSMENT
severe 3 vessel disease  presented with unstable angina, mild troponin leak  clinically improved  cath as above  now agreeable for CABG    CT surgery f/u appreciated  C/w ASA, statin, BB, ACE-I. Strict DM control with insulin (Lantus + short acting), monitor FS  Usual prophylaxis measures.  Pre-op evaluation in progress.

## 2021-06-23 NOTE — PRE-ANESTHESIA EVALUATION ADULT - NSANTHOSAYNRD_GEN_A_CORE
No. DMITRIY screening performed.  STOP BANG Legend: 0-2 = LOW Risk; 3-4 = INTERMEDIATE Risk; 5-8 = HIGH Risk

## 2021-06-23 NOTE — PRE-ANESTHESIA EVALUATION ADULT - NSRADCARDRESULTSFT_GEN_ALL_CORE
TTE 6/21/21  Summary:  1. LV Ejection Fraction by Scales's Method with a biplane EF of 62 %.  2. Normal left ventricular size and wall thicknesses, with normal systolic and diastolic function.  3. Mild dilatation of the ascending aorta (4.0 cm).

## 2021-06-23 NOTE — PROGRESS NOTE ADULT - ASSESSMENT
Cardiac Surgery Pre-op Note:  CC: Patient is a 60y old  Male who presents with a chief complaint of chest pain (2021 10:05)      Referring Physician:                                                                                             Surgeon:  Procedure: (Date) (Procedure)    Allergies    No Known Allergies    Intolerances      HPI:   60y M w/ hx of DM, HLD, BPH presenting with retrosternal chest pain that started last evening after dinner.. No radiation. 4/10 in severity, non exertional . Took a baby aspirin at home. Patient had episode of chest pain last week that resolved spontaneously, no previous chest pain or hx of cardiac disease Endorses mild SOB on exertion. Denies headache, N/V, abdominal pain, diarrhea, dysuria.    Was admitted for Obs,where he underwent CCTA that showed 3V disease, EKG with no ischemic changes, troponin <0.01--> 0.02, admitted for Cath in the AM, Loaded with aspirin in the ED  (2021 00:01)        PAST MEDICAL & SURGICAL HISTORY:      MEDICATIONS  (STANDING):  aspirin  chewable 81 milliGRAM(s) Oral daily  atorvastatin 80 milliGRAM(s) Oral at bedtime  chlorhexidine 0.12% Liquid 30 milliLiter(s) Swish and Spit once  chlorhexidine 4% Liquid 1 Application(s) Topical once  dextrose 40% Gel 15 Gram(s) Oral once  dextrose 5%. 1000 milliLiter(s) (50 mL/Hr) IV Continuous <Continuous>  dextrose 5%. 1000 milliLiter(s) (100 mL/Hr) IV Continuous <Continuous>  dextrose 50% Injectable 25 Gram(s) IV Push once  dextrose 50% Injectable 12.5 Gram(s) IV Push once  dextrose 50% Injectable 25 Gram(s) IV Push once  glucagon  Injectable 1 milliGRAM(s) IntraMuscular once  heparin   Injectable 5000 Unit(s) SubCutaneous every 8 hours  insulin glargine Injectable (LANTUS) 25 Unit(s) SubCutaneous every morning  insulin lispro (ADMELOG) corrective regimen sliding scale   SubCutaneous three times a day before meals  insulin lispro Injectable (ADMELOG) 9 Unit(s) SubCutaneous three times a day before meals  losartan 25 milliGRAM(s) Oral daily  metoprolol tartrate 25 milliGRAM(s) Oral two times a day  tamsulosin 0.4 milliGRAM(s) Oral at bedtime    MEDICATIONS  (PRN):  nitroglycerin     SubLingual 0.4 milliGRAM(s) SubLingual every 5 minutes PRN Chest Pain            21 @ 07:01  -  21 @ 07:00  --------------------------------------------------------  IN:    Oral Fluid: 356 mL  Total IN: 356 mL    OUT:    Voided (mL): 1300 mL  Total OUT: 1300 mL    Total NET: -944 mL      21 @ 07:01  -  21 @ 17:36  --------------------------------------------------------  IN:    Oral Fluid: 700 mL  Total IN: 700 mL    OUT:    Voided (mL): 175 mL  Total OUT: 175 mL    Total NET: 525 mL            Labs:                        15.3   8.82  )-----------( 243      ( 2021 05:43 )             45.9         138  |  103  |  16  ----------------------------<  214<H>  4.8   |  27  |  1.0    Ca    9.7      2021 05:43  Mg     2.0         TPro  6.7  /  Alb  4.5  /  TBili  1.2  /  DBili  x   /  AST  16  /  ALT  14  /  AlkPhos  78      PT/INR - ( 2021 05:43 )   PT: 13.60 sec;   INR: 1.18 ratio         PTT - ( 2021 07:44 )  PTT:36.6 sec    COVID result: COVID-19 PCR: NotDetec (21 @ 10:10)      Blood Type:   HGB A1C:   Prealbumin:   Pro-BNP:   Thyroid Panel:  @ 07:44/1.45  --/7.8/110    MRSA: MRSA PCR Result.: Negative ( @ 18:55)   / MSSA:   Urinalysis Basic - ( 2021 16:17 )    Color: Light Yellow / Appearance: Clear / S.032 / pH: x  Gluc: x / Ketone: Small  / Bili: Negative / Urobili: <2 mg/dL   Blood: x / Protein: Trace / Nitrite: Negative   Leuk Esterase: Small / RBC: 2 /HPF / WBC 10 /HPF   Sq Epi: x / Non Sq Epi: 1 /HPF / Bacteria: Negative        CXR:     EKG:    Carotid Duplex:      PFT's:    Echocardiogram:     Cardiac catheterization:    CT CHEST:      Gen: WN/WD NAD  Neuro: A&Ox3, nonfocal  Pulm: CTA B/L  CV: RRR, S1S2 +systolic murmur  Abd: Soft, NT, ND +BS  Ext: No edema, + peripheral pulses  5meter walk test: T1:      s/T2:     s/T3:     s        Cardic Surgery Risk Factors  CVA and/or carotid/cerebrovascular disease. Yes  No  Explain if Yes  Aortoiliac disease Yes  No  Explain if Yes  Previous MI Yes  No  Explain if Yes  Previos Cardiac Surgery Yes  No  Explain if Yes  Hemodynamics-Unstable or Shock Yes  No  Explain if Yes  Diabetis Yes  No  Explain if Yes  Hepatic Failure Yes  No  Explain if Yes  Renal failure and/or dialysis Yes  No  Explain if Yes  Heart failure-type-present or past Yes  No  Explain if Yes  COPD Yes  No  Explain if Yes  Immune System Deficiency Yes  No  Explain if Yes  Malignant Ventricular Arrhythmia Yes  No  Explain if Yes    STS Score:     Pt has AICD/PPM [ ] Yes  [x ] No             Brand Name:  Pre-op Beta Blocker ordered within 24 hrs of surgery ([facility] ONLY)?  [x ] Yes  [ ] No  If not, Why?  Type & Cross  [ ] Yes  [ ] No  NPO after Midnight [x ] Yes  [ ] No  Pre-op ABX ordered, to be taped on chart:  [ x] Yes  [ ] No     Hibiclens/Peridex ordered [x ] Yes  [ ] No  Intraop on Hold: PRBCs, CXR, CAMILO [x ]   Consent obtained  [x ] Yes  [ ] No

## 2021-06-23 NOTE — PROGRESS NOTE ADULT - SUBJECTIVE AND OBJECTIVE BOX
Cardiac Surgery Pre-op Note:  CC: Patient is a 60y old  Male who presents with a chief complaint of chest pain (2021 16:11)      Referring Physician: Teresita                                                                                            Surgeon: Dr. Tovar  Procedure: pre-op CABG, OR 21    Allergies  No Known Allergies    HPI:   60y M w/ hx of DM, HLD, BPH presenting with retrosternal chest pain that started last evening after dinner.. No radiation. 4/10 in severity, non exertional . Took a baby aspirin at home. Patient had episode of chest pain last week that resolved spontaneously, no previous chest pain or hx of cardiac disease Endorses mild SOB on exertion. Denies headache, N/V, abdominal pain, diarrhea, dysuria.    Was admitted for Obs, where he underwent CCTA that showed 3V disease, EKG with no ischemic changes, troponin <0.01--> 0.02, admitted for Cath in the AM, Loaded with aspirin in the ED  (2021 00:01)    MEDICATIONS  (STANDING):  aspirin  chewable 81 milliGRAM(s) Oral daily  atorvastatin 80 milliGRAM(s) Oral at bedtime  glucagon  Injectable 1 milliGRAM(s) IntraMuscular once  heparin   Injectable 5000 Unit(s) SubCutaneous every 8 hours  insulin glargine Injectable (LANTUS) 25 Unit(s) SubCutaneous every morning  insulin lispro (ADMELOG) corrective regimen sliding scale   SubCutaneous three times a day before meals  insulin lispro Injectable (ADMELOG) 9 Unit(s) SubCutaneous three times a day before meals  losartan 25 milliGRAM(s) Oral daily  metoprolol tartrate 25 milliGRAM(s) Oral two times a day  tamsulosin 0.4 milliGRAM(s) Oral at bedtime    MEDICATIONS  (PRN):  nitroglycerin     SubLingual 0.4 milliGRAM(s) SubLingual every 5 minutes PRN Chest Pain      Labs:                        15.3   8.82  )-----------( 243      ( 2021 05:43 )             45.9     06-    138  |  103  |  16  ----------------------------<  214<H>  4.8   |  27  |  1.0    Ca    9.7      2021 05:43  Mg     2.0     06-23    TPro  6.7  /  Alb  4.5  /  TBili  1.2  /  DBili  x   /  AST  16  /  ALT  14  /  AlkPhos  78  06-  PT/INR - ( 2021 05:43 )   PT: 13.60 sec;   INR: 1.18 ratio    PTT - ( 2021 07:44 )  PTT:36.6 sec  Covid: SARS-CoV-2 Result: Negative    Blood Type: ABO RH Interpretation: O POS   HGB A1C: 12.0  Prealbumin: 4.5 g/dL  Thyroid Panel:  @ 07:44/1.45  --/7.8/110    MRSA: MRSA PCR Result.: Negative ( @ 18:55)  Urinalysis Basic - ( 2021 16:17 )    Color: Light Yellow / Appearance: Clear / S.032 / pH: x  Gluc: x / Ketone: Small  / Bili: Negative / Urobili: <2 mg/dL   Blood: x / Protein: Trace / Nitrite: Negative   Leuk Esterase: Small / RBC: 2 /HPF / WBC 10 /HPF   Sq Epi: x / Non Sq Epi: 1 /HPF / Bacteria: Negative    CXR: `< from: Xray Chest 2 Views PA/Lat (21 @ 05:34) >  FINDINGS:    Support devices: None.    Cardiac/mediastinum/hilum: Unremarkable.    Lung parenchyma/Pleura: No focal parenchymal opacities, pleural effusions, or pneumothorax.    Skeleton/soft tissues: Unremarkable.      IMPRESSION:    No radiographic evidence of acute cardiopulmonary disease.    EKG:< from: 12 Lead ECG (21 @ 04:48) >  Ventricular Rate 98 BPM    Atrial Rate 98 BPM    P-R Interval 162 ms    QRS Duration 100 ms    Q-T Interval 380 ms    QTC Calculation(Bazett) 485 ms    P Axis 58 degrees    R Axis 18 degrees    T Axis 68 degrees    Diagnosis Line Sinus rhythm with occasional Premature ventricular complexes  Possible Left atrial enlargement  Nonspecific ST abnormality  Prolonged QT  Abnormal ECG    Carotid Duplex:  < from: VA Duplex Carotid, Bilat (21 @ 17:21) >  Impression:    20-39% stenosis of the right internal carotid artery.  20-39% stenosis of the left internal carotid artery.    PFT's: pending    Echocardiogram:< from: TTE Echo Complete w/o Contrast w/ Doppler (21 @ 09:12) >  Summary:   1. LV Ejection Fraction by Scales's Method with a biplane EF of 62 %.   2. Normal left ventricular size and wall thicknesses, with normal systolic and diastolic function.   3. Mild dilatation of the ascending aorta (4.0 cm).    PHYSICIAN INTERPRETATION:  Left Ventricle: Normal left ventricular size and wall thicknesses, with normal systolic and diastolic function.  Right Ventricle: Normal right ventricular size and function.  Left Atrium: Normal left atrial size.  Right Atrium: Normal right atrial size.  Pericardium: There is no evidence of pericardial effusion.  Mitral Valve: The mitral valve is normal in structure. No evidence of mitral stenosis. No mitral regurgitation.  Tricuspid Valve: The tricuspid valve is normal in structure. No tricuspid regurgitation visualized.  Aortic Valve: The aortic valve is trileaflet. No evidence of aortic stenosis. Peak transaortic gradient equals 5.5 mmHg, mean transaortic gradient equals 3.4 mmHg, the calculated aortic valve area equals 3.30 cm² by the continuity equation consistent with normally opening aortic valve. No aortic regurgitation.  Pulmonic Valve: The pulmonic valve is normal. No pulmonic regurgitation.  Aorta: There is dilatation of the ascending aorta.  Pulmonary Artery: The main pulmonary artery is normal in size.  Venous: The inferior vena cava was normal sized, with respiratory size variation greater than 50%.    Cardiac catheterization:c< from: Cardiac Cath Lab - Adult (21 @ 13:59) >  CORONARY CIRCULATION: The coronary circulation is right dominant. There was    3-vessel coronary artery disease (LAD, RCA, and circumflex). Distal left    main: Angiography showed mild atherosclerosis Ostial LAD: There was a 85 %    stenosis at a site with no prior intervention. Proximal LAD: There was a    diffuse 80 % stenosis. Mid LAD: There was a diffuse 90 % stenosis. Distal    LAD: Angiography showed moderate atherosclerosis 1st diagonal: There was a    80 % stenosis at the ostium of the vessel segment. Proximal circumflex:    Angiography showed mild atherosclerosis Distal circumflex: There was a 80    % stenosis. 1st obtuse marginal: There was a 80 % stenosis at the ostium    of the vessel segment. 2nd obtuse marginal: The vessel was small sized.    There was a 90 % stenosis. Ramus intermedius: The vessel was large sized.    Angiography showed mild atherosclerosis with no flow limiting lesions.    Proximal RCA: There was a 80 % stenosis. Mid RCA: There was a diffuse 99 %    stenosis at a site with no prior intervention. Distal RCA: There was a 80    % stenosis. Right PDA: Patent CLAUDIA 2 flow      Gen: WN/WD NAD  Neuro: A&Ox3, nonfocal  Pulm: CTA B/L  CV: RRR, S1S2 +systolic murmur  Abd: Soft, NT, ND +BS  Ext: No edema, + peripheral pulses    Cardic Surgery Risk Factors  CVA and/or carotid/cerebrovascular disease. Yes  No  Explain if Yes  Aortoiliac disease Yes  No  Explain if Yes  Previous MI Yes  No  Explain if Yes  Previos Cardiac Surgery Yes  No  Explain if Yes  Hemodynamics-Unstable or Shock Yes  No  Explain if Yes  Diabetis Yes  No  Explain if Yes  Hepatic Failure Yes  No  Explain if Yes  Renal failure and/or dialysis Yes  No  Explain if Yes  Heart failure-type-present or past Yes  No  Explain if Yes  COPD Yes  No  Explain if Yes  Immune System Deficiency Yes  No  Explain if Yes  Malignant Ventricular Arrhythmia Yes  No  Explain if Yes    STS Score:   Isolated CAB  Risk of Mortality:  0.600%  Renal Failure:  0.679%  Permanent Stroke:  0.617%  Prolonged Ventilation:  3.427%  DSW Infection:  0.298%  Reoperation:  1.180%  Morbidity or Mortality:  5.578%  Short Length of Stay:  64.251%  Long Length of Stay:  1.940%      Pt has AICD/PPM [ ] Yes  [x ] No             Brand Name:  Pre-op Beta Blocker ordered within 24 hrs of surgery (CABG ONLY)?  [x ] Yes  [ ] No  If not, Why?  Type & Cross  [ x] Yes  [ ] No  NPO after Midnight [x ] Yes  [ ] No  Pre-op ABX ordered, to be taped on chart:  [ x] Yes  [ ] No     Hibiclens/Peridex ordered [x ] Yes  [ ] No  Intraop on Hold: PRBCs, CXR, CAMILO [x ]   Consent obtained  [x ] Yes  [ ] No      Assessment/Plan:  60y Male pre-op for CABG on 21  - Case and plan discussed with CTU Intensivist and CT Surgeon - Dr. Tovar  - Covid swab to be done today 21  - follow up PFTs   - continue insulin for glycemic control   -            Cardiac Surgery Pre-op Note:  CC: Patient is a 60y old  Male who presents with a chief complaint of chest pain (2021 16:11)      Referring Physician: Teresita                                                                                            Surgeon: Dr. Tovar  Procedure: pre-op CABG, OR 21    Allergies  No Known Allergies    HPI:   60y M w/ hx of DM, HLD, BPH presenting with retrosternal chest pain that started last evening after dinner.. No radiation. 4/10 in severity, non exertional . Took a baby aspirin at home. Patient had episode of chest pain last week that resolved spontaneously, no previous chest pain or hx of cardiac disease Endorses mild SOB on exertion. Denies headache, N/V, abdominal pain, diarrhea, dysuria.    Was admitted for Obs, where he underwent CCTA that showed 3V disease, EKG with no ischemic changes, troponin <0.01--> 0.02, admitted for Cath in the AM, Loaded with aspirin in the ED  (2021 00:01)    MEDICATIONS  (STANDING):  aspirin  chewable 81 milliGRAM(s) Oral daily  atorvastatin 80 milliGRAM(s) Oral at bedtime  glucagon  Injectable 1 milliGRAM(s) IntraMuscular once  heparin   Injectable 5000 Unit(s) SubCutaneous every 8 hours  insulin glargine Injectable (LANTUS) 25 Unit(s) SubCutaneous every morning  insulin lispro (ADMELOG) corrective regimen sliding scale   SubCutaneous three times a day before meals  insulin lispro Injectable (ADMELOG) 9 Unit(s) SubCutaneous three times a day before meals  losartan 25 milliGRAM(s) Oral daily  metoprolol tartrate 25 milliGRAM(s) Oral two times a day  tamsulosin 0.4 milliGRAM(s) Oral at bedtime    MEDICATIONS  (PRN):  nitroglycerin     SubLingual 0.4 milliGRAM(s) SubLingual every 5 minutes PRN Chest Pain      Labs:                        15.3   8.82  )-----------( 243      ( 2021 05:43 )             45.9     06-    138  |  103  |  16  ----------------------------<  214<H>  4.8   |  27  |  1.0    Ca    9.7      2021 05:43  Mg     2.0     06-23    TPro  6.7  /  Alb  4.5  /  TBili  1.2  /  DBili  x   /  AST  16  /  ALT  14  /  AlkPhos  78  06-  PT/INR - ( 2021 05:43 )   PT: 13.60 sec;   INR: 1.18 ratio    PTT - ( 2021 07:44 )  PTT:36.6 sec  Covid: SARS-CoV-2 Result: Negative    Blood Type: ABO RH Interpretation: O POS   HGB A1C: 12.0  Prealbumin: 4.5 g/dL  Thyroid Panel:  @ 07:44/1.45  --/7.8/110    MRSA: MRSA PCR Result.: Negative ( @ 18:55)  Urinalysis Basic - ( 2021 16:17 )    Color: Light Yellow / Appearance: Clear / S.032 / pH: x  Gluc: x / Ketone: Small  / Bili: Negative / Urobili: <2 mg/dL   Blood: x / Protein: Trace / Nitrite: Negative   Leuk Esterase: Small / RBC: 2 /HPF / WBC 10 /HPF   Sq Epi: x / Non Sq Epi: 1 /HPF / Bacteria: Negative    CXR: `< from: Xray Chest 2 Views PA/Lat (21 @ 05:34) >  FINDINGS:    Support devices: None.    Cardiac/mediastinum/hilum: Unremarkable.    Lung parenchyma/Pleura: No focal parenchymal opacities, pleural effusions, or pneumothorax.    Skeleton/soft tissues: Unremarkable.      IMPRESSION:    No radiographic evidence of acute cardiopulmonary disease.    EKG:< from: 12 Lead ECG (21 @ 04:48) >  Ventricular Rate 98 BPM    Atrial Rate 98 BPM    P-R Interval 162 ms    QRS Duration 100 ms    Q-T Interval 380 ms    QTC Calculation(Bazett) 485 ms    P Axis 58 degrees    R Axis 18 degrees    T Axis 68 degrees    Diagnosis Line Sinus rhythm with occasional Premature ventricular complexes  Possible Left atrial enlargement  Nonspecific ST abnormality  Prolonged QT  Abnormal ECG    Carotid Duplex:  < from: VA Duplex Carotid, Bilat (21 @ 17:21) >  Impression:    20-39% stenosis of the right internal carotid artery.  20-39% stenosis of the left internal carotid artery.    PFT's: pending    Echocardiogram:< from: TTE Echo Complete w/o Contrast w/ Doppler (21 @ 09:12) >  Summary:   1. LV Ejection Fraction by Scales's Method with a biplane EF of 62 %.   2. Normal left ventricular size and wall thicknesses, with normal systolic and diastolic function.   3. Mild dilatation of the ascending aorta (4.0 cm).    PHYSICIAN INTERPRETATION:  Left Ventricle: Normal left ventricular size and wall thicknesses, with normal systolic and diastolic function.  Right Ventricle: Normal right ventricular size and function.  Left Atrium: Normal left atrial size.  Right Atrium: Normal right atrial size.  Pericardium: There is no evidence of pericardial effusion.  Mitral Valve: The mitral valve is normal in structure. No evidence of mitral stenosis. No mitral regurgitation.  Tricuspid Valve: The tricuspid valve is normal in structure. No tricuspid regurgitation visualized.  Aortic Valve: The aortic valve is trileaflet. No evidence of aortic stenosis. Peak transaortic gradient equals 5.5 mmHg, mean transaortic gradient equals 3.4 mmHg, the calculated aortic valve area equals 3.30 cm² by the continuity equation consistent with normally opening aortic valve. No aortic regurgitation.  Pulmonic Valve: The pulmonic valve is normal. No pulmonic regurgitation.  Aorta: There is dilatation of the ascending aorta.  Pulmonary Artery: The main pulmonary artery is normal in size.  Venous: The inferior vena cava was normal sized, with respiratory size variation greater than 50%.    Cardiac catheterization:c< from: Cardiac Cath Lab - Adult (21 @ 13:59) >  CORONARY CIRCULATION: The coronary circulation is right dominant. There was    3-vessel coronary artery disease (LAD, RCA, and circumflex). Distal left    main: Angiography showed mild atherosclerosis Ostial LAD: There was a 85 %    stenosis at a site with no prior intervention. Proximal LAD: There was a    diffuse 80 % stenosis. Mid LAD: There was a diffuse 90 % stenosis. Distal    LAD: Angiography showed moderate atherosclerosis 1st diagonal: There was a    80 % stenosis at the ostium of the vessel segment. Proximal circumflex:    Angiography showed mild atherosclerosis Distal circumflex: There was a 80    % stenosis. 1st obtuse marginal: There was a 80 % stenosis at the ostium    of the vessel segment. 2nd obtuse marginal: The vessel was small sized.    There was a 90 % stenosis. Ramus intermedius: The vessel was large sized.    Angiography showed mild atherosclerosis with no flow limiting lesions.    Proximal RCA: There was a 80 % stenosis. Mid RCA: There was a diffuse 99 %    stenosis at a site with no prior intervention. Distal RCA: There was a 80    % stenosis. Right PDA: Patent CLAUDIA 2 flow      Gen: WN/WD NAD  Neuro: A&Ox3, nonfocal  Pulm: CTA B/L  CV: RRR, S1S2 +systolic murmur  Abd: Soft, NT, ND +BS  Ext: No edema, + peripheral pulses    STS Score:   Isolated CAB  Risk of Mortality:  0.600%  Renal Failure:  0.679%  Permanent Stroke:  0.617%  Prolonged Ventilation:  3.427%  DSW Infection:  0.298%  Reoperation:  1.180%  Morbidity or Mortality:  5.578%  Short Length of Stay:  64.251%  Long Length of Stay:  1.940%      Pt has AICD/PPM [ ] Yes  [x ] No             Brand Name:  Pre-op Beta Blocker ordered within 24 hrs of surgery (CABG ONLY)?  [x ] Yes  [ ] No  If not, Why?  Type & Cross  [ x] Yes  [ ] No  NPO after Midnight [x ] Yes  [ ] No  Pre-op ABX ordered, to be taped on chart:  [ x] Yes  [ ] No     Hibiclens/Peridex ordered [x ] Yes  [ ] No  Intraop on Hold: PRBCs, CXR, CAMILO [x ]   Consent obtained  [x ] Yes  [ ] No      Assessment/Plan:  60y Male pre-op for CABG on 21  - Case and plan discussed with CTU Intensivist and CT Surgeon - Dr. Tovar  - Covid swab to be done today 21  - follow up PFTs   - continue insulin for glycemic control   - OR for CABG with Dr. Tovar tomorrow           Cardiac Surgery Pre-op Note:  CC: Patient is a 60y old  Male who presents with a chief complaint of chest pain (2021 16:11)      Referring Physician: Teresita                                                                                            Surgeon: Dr. Tovar  Procedure: pre-op CABG, OR 21    Allergies  No Known Allergies    HPI:   60y M w/ hx of DM, HLD, BPH presenting with retrosternal chest pain that started last evening after dinner.. No radiation. 4/10 in severity, non exertional . Took a baby aspirin at home. Patient had episode of chest pain last week that resolved spontaneously, no previous chest pain or hx of cardiac disease Endorses mild SOB on exertion. Denies headache, N/V, abdominal pain, diarrhea, dysuria.    Was admitted for Obs, where he underwent CCTA that showed 3V disease, EKG with no ischemic changes, troponin <0.01--> 0.02, admitted for Cath in the AM, Loaded with aspirin in the ED  (2021 00:01)    MEDICATIONS  (STANDING):  aspirin  chewable 81 milliGRAM(s) Oral daily  atorvastatin 80 milliGRAM(s) Oral at bedtime  glucagon  Injectable 1 milliGRAM(s) IntraMuscular once  heparin   Injectable 5000 Unit(s) SubCutaneous every 8 hours  insulin glargine Injectable (LANTUS) 25 Unit(s) SubCutaneous every morning  insulin lispro (ADMELOG) corrective regimen sliding scale   SubCutaneous three times a day before meals  insulin lispro Injectable (ADMELOG) 9 Unit(s) SubCutaneous three times a day before meals  losartan 25 milliGRAM(s) Oral daily  metoprolol tartrate 25 milliGRAM(s) Oral two times a day  tamsulosin 0.4 milliGRAM(s) Oral at bedtime    MEDICATIONS  (PRN):  nitroglycerin     SubLingual 0.4 milliGRAM(s) SubLingual every 5 minutes PRN Chest Pain      Labs:                        15.3   8.82  )-----------( 243      ( 2021 05:43 )             45.9     06-    138  |  103  |  16  ----------------------------<  214<H>  4.8   |  27  |  1.0    Ca    9.7      2021 05:43  Mg     2.0     06-23    TPro  6.7  /  Alb  4.5  /  TBili  1.2  /  DBili  x   /  AST  16  /  ALT  14  /  AlkPhos  78    PT/INR - ( 2021 05:43 )   PT: 13.60 sec;   INR: 1.18 ratio    PTT - ( 2021 07:44 )  PTT:36.6 sec  Covid: SARS-CoV-2 Result: Negative    Blood Type: ABO RH Interpretation: O POS   HGB A1C: 12.0  Prealbumin: 4.5 g/dL  Thyroid Panel:  @ 07:44/1.45  --/7.8/110    MRSA: MRSA PCR Result.: Negative ( @ 18:55)  Urinalysis Basic - ( 2021 16:17 )    Color: Light Yellow / Appearance: Clear / S.032 / pH: x  Gluc: x / Ketone: Small  / Bili: Negative / Urobili: <2 mg/dL   Blood: x / Protein: Trace / Nitrite: Negative   Leuk Esterase: Small / RBC: 2 /HPF / WBC 10 /HPF   Sq Epi: x / Non Sq Epi: 1 /HPF / Bacteria: Negative    COVID-19 PCR . (21 @ 10:10)    COVID-19 PCR: NotDetec: Methodology:  The [employer] m SARS-CoV-2 Assay is a real time reverse transcriptase (RT)  Polymerase Chain Reaction (PCR) test intended for the qualitative  detection of nucleic acid from the SARS-CoV-2 in nasal swabs,  self-collected at a health care location or collected by a healthcare  worker, nasopharyngeal and oropharyngeal swabs collected by a healthcare  worker or bronchoalveolar lavage fluid from patients suspected of  COVID-19 by their health care provider.  The SARS-CoV-2 assay is performed on the [employer] system.  Reference Range:  Not Detected  This test has been validated by [facility]  Molecular lab. This assay is for in Vitro diagnostic testing under FDA  Emergency Use Authorization only.  Performing Location:  This test was performed at [facility],  [place], [place] [address]      CXR: `< from: Xray Chest 2 Views PA/Lat (21 @ 05:34) >  FINDINGS:    Support devices: None.    Cardiac/mediastinum/hilum: Unremarkable.    Lung parenchyma/Pleura: No focal parenchymal opacities, pleural effusions, or pneumothorax.    Skeleton/soft tissues: Unremarkable.      IMPRESSION:    No radiographic evidence of acute cardiopulmonary disease.    EKG:< from: 12 Lead ECG (21 @ 04:48) >  Ventricular Rate 98 BPM    Atrial Rate 98 BPM    P-R Interval 162 ms    QRS Duration 100 ms    Q-T Interval 380 ms    QTC Calculation(Bazett) 485 ms    P Axis 58 degrees    R Axis 18 degrees    T Axis 68 degrees    Diagnosis Line Sinus rhythm with occasional Premature ventricular complexes  Possible Left atrial enlargement  Nonspecific ST abnormality  Prolonged QT  Abnormal ECG    Carotid Duplex:  < from: VA Duplex Carotid, Bilat (21 @ 17:21) >  Impression:    20-39% stenosis of the right internal carotid artery.  20-39% stenosis of the left internal carotid artery.    PFT's: FEV1 113%    Echocardiogram:< from: TTE Echo Complete w/o Contrast w/ Doppler (21 @ 09:12) >  Summary:   1. LV Ejection Fraction by Scales's Method with a biplane EF of 62 %.   2. Normal left ventricular size and wall thicknesses, with normal systolic and diastolic function.   3. Mild dilatation of the ascending aorta (4.0 cm).    PHYSICIAN INTERPRETATION:  Left Ventricle: Normal left ventricular size and wall thicknesses, with normal systolic and diastolic function.  Right Ventricle: Normal right ventricular size and function.  Left Atrium: Normal left atrial size.  Right Atrium: Normal right atrial size.  Pericardium: There is no evidence of pericardial effusion.  Mitral Valve: The mitral valve is normal in structure. No evidence of mitral stenosis. No mitral regurgitation.  Tricuspid Valve: The tricuspid valve is normal in structure. No tricuspid regurgitation visualized.  Aortic Valve: The aortic valve is trileaflet. No evidence of aortic stenosis. Peak transaortic gradient equals 5.5 mmHg, mean transaortic gradient equals 3.4 mmHg, the calculated aortic valve area equals 3.30 cm² by the continuity equation consistent with normally opening aortic valve. No aortic regurgitation.  Pulmonic Valve: The pulmonic valve is normal. No pulmonic regurgitation.  Aorta: There is dilatation of the ascending aorta.  Pulmonary Artery: The main pulmonary artery is normal in size.  Venous: The inferior vena cava was normal sized, with respiratory size variation greater than 50%.    Cardiac catheterization:c< from: Cardiac Cath Lab - Adult (21 @ 13:59) >  CORONARY CIRCULATION: The coronary circulation is right dominant. There was    3-vessel coronary artery disease (LAD, RCA, and circumflex). Distal left    main: Angiography showed mild atherosclerosis Ostial LAD: There was a 85 %    stenosis at a site with no prior intervention. Proximal LAD: There was a    diffuse 80 % stenosis. Mid LAD: There was a diffuse 90 % stenosis. Distal    LAD: Angiography showed moderate atherosclerosis 1st diagonal: There was a    80 % stenosis at the ostium of the vessel segment. Proximal circumflex:    Angiography showed mild atherosclerosis Distal circumflex: There was a 80    % stenosis. 1st obtuse marginal: There was a 80 % stenosis at the ostium    of the vessel segment. 2nd obtuse marginal: The vessel was small sized.    There was a 90 % stenosis. Ramus intermedius: The vessel was large sized.    Angiography showed mild atherosclerosis with no flow limiting lesions.    Proximal RCA: There was a 80 % stenosis. Mid RCA: There was a diffuse 99 %    stenosis at a site with no prior intervention. Distal RCA: There was a 80    % stenosis. Right PDA: Patent CLAUDIA 2 flow    T(F): 97.8 (21 @ 17:00), Max: 97.9 (21 @ 20:30)  HR: 76 (21 @ 17:05) (74 - 90)  BP: 135/84 (21 @ 17:05) (111/69 - 135/84)  RR: 23 (21 @ 17:05) (18 - 25)  SpO2: 98% (21 @ 17:05) (97% - 99%)    RUE BP: 126/75                       LUE BP: 135/84    Gen: WN/WD NAD  Neuro: A&Ox3, nonfocal  Pulm: CTA B/L  CV: RRR, S1S2 +systolic murmur  Abd: Soft, NT, ND +BS  Ext: No edema, + peripheral pulses    STS Score:   Isolated CAB  Risk of Mortality:  0.600%  Renal Failure:  0.679%  Permanent Stroke:  0.617%  Prolonged Ventilation:  3.427%  DSW Infection:  0.298%  Reoperation:  1.180%  Morbidity or Mortality:  5.578%  Short Length of Stay:  64.251%  Long Length of Stay:  1.940%      Pt has AICD/PPM [ ] Yes  [x ] No             Brand Name:  Pre-op Beta Blocker ordered within 24 hrs of surgery (CABG ONLY)?  [x ] Yes  [ ] No  If not, Why?  Type & Cross  [ x] Yes  [ ] No  NPO after Midnight [x ] Yes  [ ] No  Pre-op ABX ordered, to be taped on chart:  [ x] Yes  [ ] No     Hibiclens/Peridex ordered [x ] Yes  [ ] No  Intraop on Hold: PRBCs, CXR, CAMILO [x ]   Consent obtained  [x ] Yes  [ ] No      Assessment/Plan:  60y Male pre-op for CABG on 21  - Case and plan discussed with CTU Intensivist and CT Surgeon - Dr. Tovar  - Covid swab to be done today 21  - follow up PFTs   - continue insulin for glycemic control   - OR for CABG with Dr. Tovar tomorrow

## 2021-06-24 ENCOUNTER — TRANSCRIPTION ENCOUNTER (OUTPATIENT)
Age: 61
End: 2021-06-24

## 2021-06-24 LAB
ABO RH CONFIRMATION: SIGNIFICANT CHANGE UP
ALBUMIN SERPL ELPH-MCNC: 3.8 G/DL — SIGNIFICANT CHANGE UP (ref 3.5–5.2)
ALBUMIN SERPL ELPH-MCNC: 4.2 G/DL — SIGNIFICANT CHANGE UP (ref 3.5–5.2)
ALP SERPL-CCNC: 50 U/L — SIGNIFICANT CHANGE UP (ref 30–115)
ALP SERPL-CCNC: 74 U/L — SIGNIFICANT CHANGE UP (ref 30–115)
ALT FLD-CCNC: 11 U/L — SIGNIFICANT CHANGE UP (ref 0–41)
ALT FLD-CCNC: 13 U/L — SIGNIFICANT CHANGE UP (ref 0–41)
ANION GAP SERPL CALC-SCNC: 10 MMOL/L — SIGNIFICANT CHANGE UP (ref 7–14)
ANION GAP SERPL CALC-SCNC: 11 MMOL/L — SIGNIFICANT CHANGE UP (ref 7–14)
ANISOCYTOSIS BLD QL: SLIGHT — SIGNIFICANT CHANGE UP
APTT BLD: 36.6 SEC — SIGNIFICANT CHANGE UP (ref 27–39.2)
AST SERPL-CCNC: 12 U/L — SIGNIFICANT CHANGE UP (ref 0–41)
AST SERPL-CCNC: 37 U/L — SIGNIFICANT CHANGE UP (ref 0–41)
BASOPHILS # BLD AUTO: 0 K/UL — SIGNIFICANT CHANGE UP (ref 0–0.2)
BASOPHILS # BLD AUTO: 0.03 K/UL — SIGNIFICANT CHANGE UP (ref 0–0.2)
BASOPHILS NFR BLD AUTO: 0 % — SIGNIFICANT CHANGE UP (ref 0–1)
BASOPHILS NFR BLD AUTO: 0.3 % — SIGNIFICANT CHANGE UP (ref 0–1)
BILIRUB SERPL-MCNC: 1.2 MG/DL — SIGNIFICANT CHANGE UP (ref 0.2–1.2)
BILIRUB SERPL-MCNC: 1.9 MG/DL — HIGH (ref 0.2–1.2)
BUN SERPL-MCNC: 15 MG/DL — SIGNIFICANT CHANGE UP (ref 10–20)
BUN SERPL-MCNC: 20 MG/DL — SIGNIFICANT CHANGE UP (ref 10–20)
BURR CELLS BLD QL SMEAR: PRESENT — SIGNIFICANT CHANGE UP
CALCIUM SERPL-MCNC: 8.3 MG/DL — LOW (ref 8.5–10.1)
CALCIUM SERPL-MCNC: 9.5 MG/DL — SIGNIFICANT CHANGE UP (ref 8.5–10.1)
CHLORIDE SERPL-SCNC: 101 MMOL/L — SIGNIFICANT CHANGE UP (ref 98–110)
CHLORIDE SERPL-SCNC: 106 MMOL/L — SIGNIFICANT CHANGE UP (ref 98–110)
CO2 SERPL-SCNC: 22 MMOL/L — SIGNIFICANT CHANGE UP (ref 17–32)
CO2 SERPL-SCNC: 26 MMOL/L — SIGNIFICANT CHANGE UP (ref 17–32)
CREAT SERPL-MCNC: 0.7 MG/DL — SIGNIFICANT CHANGE UP (ref 0.7–1.5)
CREAT SERPL-MCNC: 0.8 MG/DL — SIGNIFICANT CHANGE UP (ref 0.7–1.5)
EOSINOPHIL # BLD AUTO: 0 K/UL — SIGNIFICANT CHANGE UP (ref 0–0.7)
EOSINOPHIL # BLD AUTO: 0.1 K/UL — SIGNIFICANT CHANGE UP (ref 0–0.7)
EOSINOPHIL NFR BLD AUTO: 0 % — SIGNIFICANT CHANGE UP (ref 0–8)
EOSINOPHIL NFR BLD AUTO: 1.1 % — SIGNIFICANT CHANGE UP (ref 0–8)
GAS PNL BLDA: SIGNIFICANT CHANGE UP
GIANT PLATELETS BLD QL SMEAR: PRESENT — SIGNIFICANT CHANGE UP
GLUCOSE BLDC GLUCOMTR-MCNC: 117 MG/DL — HIGH (ref 70–99)
GLUCOSE BLDC GLUCOMTR-MCNC: 143 MG/DL — HIGH (ref 70–99)
GLUCOSE BLDC GLUCOMTR-MCNC: 164 MG/DL — HIGH (ref 70–99)
GLUCOSE BLDC GLUCOMTR-MCNC: 170 MG/DL — HIGH (ref 70–99)
GLUCOSE BLDC GLUCOMTR-MCNC: 177 MG/DL — HIGH (ref 70–99)
GLUCOSE BLDC GLUCOMTR-MCNC: 97 MG/DL — SIGNIFICANT CHANGE UP (ref 70–99)
GLUCOSE SERPL-MCNC: 125 MG/DL — HIGH (ref 70–99)
GLUCOSE SERPL-MCNC: 144 MG/DL — HIGH (ref 70–99)
HCT VFR BLD CALC: 33.2 % — LOW (ref 42–52)
HCT VFR BLD CALC: 43.7 % — SIGNIFICANT CHANGE UP (ref 42–52)
HGB BLD-MCNC: 11.5 G/DL — LOW (ref 14–18)
HGB BLD-MCNC: 14.9 G/DL — SIGNIFICANT CHANGE UP (ref 14–18)
IMM GRANULOCYTES NFR BLD AUTO: 0.4 % — HIGH (ref 0.1–0.3)
INR BLD: 1.27 RATIO — SIGNIFICANT CHANGE UP (ref 0.65–1.3)
LYMPHOCYTES # BLD AUTO: 0.39 K/UL — LOW (ref 1.2–3.4)
LYMPHOCYTES # BLD AUTO: 2.6 % — LOW (ref 20.5–51.1)
LYMPHOCYTES # BLD AUTO: 2.75 K/UL — SIGNIFICANT CHANGE UP (ref 1.2–3.4)
LYMPHOCYTES # BLD AUTO: 29 % — SIGNIFICANT CHANGE UP (ref 20.5–51.1)
MAGNESIUM SERPL-MCNC: 2.1 MG/DL — SIGNIFICANT CHANGE UP (ref 1.8–2.4)
MAGNESIUM SERPL-MCNC: 2.7 MG/DL — HIGH (ref 1.8–2.4)
MCHC RBC-ENTMCNC: 28.9 PG — SIGNIFICANT CHANGE UP (ref 27–31)
MCHC RBC-ENTMCNC: 29.3 PG — SIGNIFICANT CHANGE UP (ref 27–31)
MCHC RBC-ENTMCNC: 34.1 G/DL — SIGNIFICANT CHANGE UP (ref 32–37)
MCHC RBC-ENTMCNC: 34.6 G/DL — SIGNIFICANT CHANGE UP (ref 32–37)
MCV RBC AUTO: 84.7 FL — SIGNIFICANT CHANGE UP (ref 80–94)
MCV RBC AUTO: 84.9 FL — SIGNIFICANT CHANGE UP (ref 80–94)
MICROCYTES BLD QL: SLIGHT — SIGNIFICANT CHANGE UP
MONOCYTES # BLD AUTO: 0.39 K/UL — SIGNIFICANT CHANGE UP (ref 0.1–0.6)
MONOCYTES # BLD AUTO: 0.78 K/UL — HIGH (ref 0.1–0.6)
MONOCYTES NFR BLD AUTO: 2.6 % — SIGNIFICANT CHANGE UP (ref 1.7–9.3)
MONOCYTES NFR BLD AUTO: 8.2 % — SIGNIFICANT CHANGE UP (ref 1.7–9.3)
NEUTROPHILS # BLD AUTO: 14.09 K/UL — HIGH (ref 1.4–6.5)
NEUTROPHILS # BLD AUTO: 5.78 K/UL — SIGNIFICANT CHANGE UP (ref 1.4–6.5)
NEUTROPHILS NFR BLD AUTO: 61 % — SIGNIFICANT CHANGE UP (ref 42.2–75.2)
NEUTROPHILS NFR BLD AUTO: 93.9 % — HIGH (ref 42.2–75.2)
NEUTS BAND # BLD: 0.9 % — SIGNIFICANT CHANGE UP (ref 0–6)
NRBC # BLD: 0 /100 WBCS — SIGNIFICANT CHANGE UP (ref 0–0)
PLAT MORPH BLD: NORMAL — SIGNIFICANT CHANGE UP
PLATELET # BLD AUTO: 133 K/UL — SIGNIFICANT CHANGE UP (ref 130–400)
PLATELET # BLD AUTO: 245 K/UL — SIGNIFICANT CHANGE UP (ref 130–400)
POTASSIUM SERPL-MCNC: 3.7 MMOL/L — SIGNIFICANT CHANGE UP (ref 3.5–5)
POTASSIUM SERPL-MCNC: 4.8 MMOL/L — SIGNIFICANT CHANGE UP (ref 3.5–5)
POTASSIUM SERPL-SCNC: 3.7 MMOL/L — SIGNIFICANT CHANGE UP (ref 3.5–5)
POTASSIUM SERPL-SCNC: 4.8 MMOL/L — SIGNIFICANT CHANGE UP (ref 3.5–5)
PROT SERPL-MCNC: 5.1 G/DL — LOW (ref 6–8)
PROT SERPL-MCNC: 6.4 G/DL — SIGNIFICANT CHANGE UP (ref 6–8)
PROTHROM AB SERPL-ACNC: 14.6 SEC — HIGH (ref 9.95–12.87)
RBC # BLD: 3.92 M/UL — LOW (ref 4.7–6.1)
RBC # BLD: 5.15 M/UL — SIGNIFICANT CHANGE UP (ref 4.7–6.1)
RBC # FLD: 12.7 % — SIGNIFICANT CHANGE UP (ref 11.5–14.5)
RBC # FLD: 12.7 % — SIGNIFICANT CHANGE UP (ref 11.5–14.5)
RBC BLD AUTO: ABNORMAL
SODIUM SERPL-SCNC: 138 MMOL/L — SIGNIFICANT CHANGE UP (ref 135–146)
SODIUM SERPL-SCNC: 138 MMOL/L — SIGNIFICANT CHANGE UP (ref 135–146)
WBC # BLD: 14.86 K/UL — HIGH (ref 4.8–10.8)
WBC # BLD: 9.48 K/UL — SIGNIFICANT CHANGE UP (ref 4.8–10.8)
WBC # FLD AUTO: 14.86 K/UL — HIGH (ref 4.8–10.8)
WBC # FLD AUTO: 9.48 K/UL — SIGNIFICANT CHANGE UP (ref 4.8–10.8)

## 2021-06-24 PROCEDURE — 33534 CABG ARTERIAL TWO: CPT | Mod: AS

## 2021-06-24 PROCEDURE — 33518 CABG ARTERY-VEIN TWO: CPT

## 2021-06-24 PROCEDURE — 71045 X-RAY EXAM CHEST 1 VIEW: CPT | Mod: 26

## 2021-06-24 PROCEDURE — 35600 OPEN HRV UXTR ART 1 SGM CAB: CPT | Mod: LT

## 2021-06-24 PROCEDURE — 33534 CABG ARTERIAL TWO: CPT

## 2021-06-24 PROCEDURE — 33508 ENDOSCOPIC VEIN HARVEST: CPT | Mod: AS,59

## 2021-06-24 PROCEDURE — 93010 ELECTROCARDIOGRAM REPORT: CPT

## 2021-06-24 PROCEDURE — 35600 OPEN HRV UXTR ART 1 SGM CAB: CPT | Mod: AS,LT

## 2021-06-24 PROCEDURE — 33508 ENDOSCOPIC VEIN HARVEST: CPT | Mod: 59

## 2021-06-24 PROCEDURE — 94060 EVALUATION OF WHEEZING: CPT | Mod: 26

## 2021-06-24 PROCEDURE — 33518 CABG ARTERY-VEIN TWO: CPT | Mod: AS

## 2021-06-24 RX ORDER — CEFAZOLIN SODIUM 1 G
1000 VIAL (EA) INJECTION EVERY 8 HOURS
Refills: 0 | Status: DISCONTINUED | OUTPATIENT
Start: 2021-06-24 | End: 2021-06-26

## 2021-06-24 RX ORDER — CHLORHEXIDINE GLUCONATE 213 G/1000ML
1 SOLUTION TOPICAL
Refills: 0 | Status: DISCONTINUED | OUTPATIENT
Start: 2021-06-24 | End: 2021-06-29

## 2021-06-24 RX ORDER — FAMOTIDINE 10 MG/ML
20 INJECTION INTRAVENOUS EVERY 12 HOURS
Refills: 0 | Status: DISCONTINUED | OUTPATIENT
Start: 2021-06-24 | End: 2021-06-25

## 2021-06-24 RX ORDER — NICARDIPINE HYDROCHLORIDE 30 MG/1
5 CAPSULE, EXTENDED RELEASE ORAL
Qty: 40 | Refills: 0 | Status: DISCONTINUED | OUTPATIENT
Start: 2021-06-24 | End: 2021-06-25

## 2021-06-24 RX ORDER — PROPOFOL 10 MG/ML
15 INJECTION, EMULSION INTRAVENOUS
Qty: 1000 | Refills: 0 | Status: DISCONTINUED | OUTPATIENT
Start: 2021-06-24 | End: 2021-06-25

## 2021-06-24 RX ORDER — VANCOMYCIN HCL 1 G
1000 VIAL (EA) INTRAVENOUS EVERY 12 HOURS
Refills: 0 | Status: COMPLETED | OUTPATIENT
Start: 2021-06-24 | End: 2021-06-26

## 2021-06-24 RX ORDER — CHLORHEXIDINE GLUCONATE 213 G/1000ML
15 SOLUTION TOPICAL EVERY 12 HOURS
Refills: 0 | Status: DISCONTINUED | OUTPATIENT
Start: 2021-06-24 | End: 2021-06-25

## 2021-06-24 RX ORDER — ONDANSETRON 8 MG/1
4 TABLET, FILM COATED ORAL ONCE
Refills: 0 | Status: DISCONTINUED | OUTPATIENT
Start: 2021-06-24 | End: 2021-06-29

## 2021-06-24 RX ORDER — OXYCODONE HYDROCHLORIDE 5 MG/1
10 TABLET ORAL EVERY 4 HOURS
Refills: 0 | Status: DISCONTINUED | OUTPATIENT
Start: 2021-06-25 | End: 2021-06-29

## 2021-06-24 RX ORDER — MEPERIDINE HYDROCHLORIDE 50 MG/ML
25 INJECTION INTRAMUSCULAR; INTRAVENOUS; SUBCUTANEOUS ONCE
Refills: 0 | Status: DISCONTINUED | OUTPATIENT
Start: 2021-06-24 | End: 2021-06-27

## 2021-06-24 RX ORDER — NOREPINEPHRINE BITARTRATE/D5W 8 MG/250ML
0.05 PLASTIC BAG, INJECTION (ML) INTRAVENOUS
Qty: 8 | Refills: 0 | Status: DISCONTINUED | OUTPATIENT
Start: 2021-06-24 | End: 2021-06-25

## 2021-06-24 RX ORDER — DEXTROSE 50 % IN WATER 50 %
25 SYRINGE (ML) INTRAVENOUS
Refills: 0 | Status: DISCONTINUED | OUTPATIENT
Start: 2021-06-24 | End: 2021-06-29

## 2021-06-24 RX ORDER — NITROGLYCERIN 6.5 MG
5 CAPSULE, EXTENDED RELEASE ORAL
Qty: 50 | Refills: 0 | Status: DISCONTINUED | OUTPATIENT
Start: 2021-06-24 | End: 2021-06-26

## 2021-06-24 RX ORDER — ASPIRIN/CALCIUM CARB/MAGNESIUM 324 MG
300 TABLET ORAL ONCE
Refills: 0 | Status: COMPLETED | OUTPATIENT
Start: 2021-06-24 | End: 2021-06-24

## 2021-06-24 RX ORDER — SODIUM CHLORIDE 9 MG/ML
1000 INJECTION INTRAMUSCULAR; INTRAVENOUS; SUBCUTANEOUS
Refills: 0 | Status: DISCONTINUED | OUTPATIENT
Start: 2021-06-24 | End: 2021-06-29

## 2021-06-24 RX ORDER — ACETAMINOPHEN 500 MG
1000 TABLET ORAL ONCE
Refills: 0 | Status: COMPLETED | OUTPATIENT
Start: 2021-06-26 | End: 2021-06-26

## 2021-06-24 RX ORDER — POTASSIUM CHLORIDE 20 MEQ
20 PACKET (EA) ORAL
Refills: 0 | Status: COMPLETED | OUTPATIENT
Start: 2021-06-24 | End: 2021-06-25

## 2021-06-24 RX ORDER — ACETAMINOPHEN 500 MG
1000 TABLET ORAL ONCE
Refills: 0 | Status: COMPLETED | OUTPATIENT
Start: 2021-06-25 | End: 2021-06-25

## 2021-06-24 RX ORDER — DOBUTAMINE HCL 250MG/20ML
0.5 VIAL (ML) INTRAVENOUS
Qty: 500 | Refills: 0 | Status: DISCONTINUED | OUTPATIENT
Start: 2021-06-24 | End: 2021-06-26

## 2021-06-24 RX ORDER — OXYCODONE HYDROCHLORIDE 5 MG/1
5 TABLET ORAL EVERY 4 HOURS
Refills: 0 | Status: DISCONTINUED | OUTPATIENT
Start: 2021-06-25 | End: 2021-06-29

## 2021-06-24 RX ORDER — DEXMEDETOMIDINE HYDROCHLORIDE IN 0.9% SODIUM CHLORIDE 4 UG/ML
0.25 INJECTION INTRAVENOUS
Qty: 200 | Refills: 0 | Status: DISCONTINUED | OUTPATIENT
Start: 2021-06-24 | End: 2021-06-25

## 2021-06-24 RX ORDER — VANCOMYCIN HCL 1 G
1000 VIAL (EA) INTRAVENOUS EVERY 12 HOURS
Refills: 0 | Status: DISCONTINUED | OUTPATIENT
Start: 2021-06-24 | End: 2021-06-24

## 2021-06-24 RX ORDER — ACETAMINOPHEN 500 MG
1000 TABLET ORAL ONCE
Refills: 0 | Status: COMPLETED | OUTPATIENT
Start: 2021-06-24 | End: 2021-06-24

## 2021-06-24 RX ORDER — INSULIN HUMAN 100 [IU]/ML
1 INJECTION, SOLUTION SUBCUTANEOUS
Qty: 100 | Refills: 0 | Status: DISCONTINUED | OUTPATIENT
Start: 2021-06-24 | End: 2021-06-27

## 2021-06-24 RX ORDER — FENTANYL CITRATE 50 UG/ML
50 INJECTION INTRAVENOUS ONCE
Refills: 0 | Status: DISCONTINUED | OUTPATIENT
Start: 2021-06-24 | End: 2021-06-24

## 2021-06-24 RX ORDER — SENNA PLUS 8.6 MG/1
2 TABLET ORAL AT BEDTIME
Refills: 0 | Status: DISCONTINUED | OUTPATIENT
Start: 2021-06-26 | End: 2021-06-29

## 2021-06-24 RX ORDER — DEXTROSE 50 % IN WATER 50 %
50 SYRINGE (ML) INTRAVENOUS
Refills: 0 | Status: DISCONTINUED | OUTPATIENT
Start: 2021-06-24 | End: 2021-06-29

## 2021-06-24 RX ORDER — ASPIRIN/CALCIUM CARB/MAGNESIUM 324 MG
325 TABLET ORAL DAILY
Refills: 0 | Status: DISCONTINUED | OUTPATIENT
Start: 2021-06-25 | End: 2021-06-28

## 2021-06-24 RX ORDER — POLYETHYLENE GLYCOL 3350 17 G/17G
17 POWDER, FOR SOLUTION ORAL DAILY
Refills: 0 | Status: DISCONTINUED | OUTPATIENT
Start: 2021-06-24 | End: 2021-06-29

## 2021-06-24 RX ORDER — CHLORHEXIDINE GLUCONATE 213 G/1000ML
5 SOLUTION TOPICAL
Refills: 0 | Status: DISCONTINUED | OUTPATIENT
Start: 2021-06-24 | End: 2021-06-25

## 2021-06-24 RX ADMIN — Medication 100 MILLIGRAM(S): at 21:27

## 2021-06-24 RX ADMIN — FENTANYL CITRATE 50 MICROGRAM(S): 50 INJECTION INTRAVENOUS at 23:15

## 2021-06-24 RX ADMIN — Medication 1000 MILLIGRAM(S): at 21:15

## 2021-06-24 RX ADMIN — FENTANYL CITRATE 50 MICROGRAM(S): 50 INJECTION INTRAVENOUS at 23:00

## 2021-06-24 RX ADMIN — Medication 100 MILLIEQUIVALENT(S): at 23:14

## 2021-06-24 RX ADMIN — CHLORHEXIDINE GLUCONATE 15 MILLILITER(S): 213 SOLUTION TOPICAL at 17:56

## 2021-06-24 RX ADMIN — Medication 1.5 MICROGRAM(S)/MIN: at 20:34

## 2021-06-24 RX ADMIN — Medication 250 MILLIGRAM(S): at 18:27

## 2021-06-24 RX ADMIN — Medication 400 MILLIGRAM(S): at 21:00

## 2021-06-24 RX ADMIN — FAMOTIDINE 20 MILLIGRAM(S): 10 INJECTION INTRAVENOUS at 17:56

## 2021-06-24 RX ADMIN — DEXMEDETOMIDINE HYDROCHLORIDE IN 0.9% SODIUM CHLORIDE 6.01 MICROGRAM(S)/KG/HR: 4 INJECTION INTRAVENOUS at 18:47

## 2021-06-24 RX ADMIN — NICARDIPINE HYDROCHLORIDE 25 MG/HR: 30 CAPSULE, EXTENDED RELEASE ORAL at 20:34

## 2021-06-24 RX ADMIN — CHLORHEXIDINE GLUCONATE 30 MILLILITER(S): 213 SOLUTION TOPICAL at 06:15

## 2021-06-24 RX ADMIN — DEXMEDETOMIDINE HYDROCHLORIDE IN 0.9% SODIUM CHLORIDE 6.01 MICROGRAM(S)/KG/HR: 4 INJECTION INTRAVENOUS at 20:23

## 2021-06-24 NOTE — DISCHARGE NOTE PROVIDER - NSDCFUADDAPPT_GEN_ALL_CORE_FT
pt is scheduled to follow up with dr braswell on 7/02 at 2pm and was instructed to call cardiologist for follow up appointment in 2 weeks and to call pmd for follow up appointment in 2-4 weeks from day od discharge

## 2021-06-24 NOTE — DISCHARGE NOTE PROVIDER - NSDCFUADDINST_GEN_ALL_CORE_FT
please avoid any heavy lifting, pushing, or pulling anything > 10 lbs x 3 months; please no driving or sitting in the front seat or sleeping on side x 6 weeks; check temp and weight daily and shower daily  please monitor daily blood pressure and check fs with meals and at bedtime and record while on insulin

## 2021-06-24 NOTE — BRIEF OPERATIVE NOTE - OPERATION/FINDINGS
Deep chest  Excess oozing  Diffuse diabetic small vessels   Pericardium cut down laterally  Anterior bovine pericardium reconstruction.  all probed

## 2021-06-24 NOTE — DISCHARGE NOTE PROVIDER - NSDCMRMEDTOKEN_GEN_ALL_CORE_FT
alfuzosin 10 mg oral tablet, extended release: 1 tab(s) orally once a day  atorvastatin 20 mg oral tablet: 1 tab(s) orally once a day  glipiZIDE 2.5 mg oral tablet, extended release: 1 tab(s) orally once a day  metFORMIN 500 mg oral tablet, extended release: 1 tab(s) orally 2 times a day   aspirin 81 mg oral delayed release tablet: 1 tab(s) orally once a day  atorvastatin 20 mg oral tablet: 1 tab(s) orally once a day (at bedtime)  clopidogrel 75 mg oral tablet: 1 tab(s) orally once a day  famotidine 20 mg oral tablet: 1 tab(s) orally 2 times a day  glucometer (per patient&#x27;s insurance): Test blood sugars four times a day. Dispense #1 glucometer.  lancets: 1 application subcutaneously 4 times a day   Lantus Solostar Pen 100 units/mL subcutaneous solution: 20 unit(s) subcutaneous once a day   metFORMIN 1000 mg oral tablet: 1 tab(s) orally 2 times a day (with meals)  metoprolol tartrate 25 mg oral tablet: 0.5 tab(s) orally 2 times a day   oxycodone-acetaminophen 5 mg-325 mg oral tablet: 1 tab(s) orally every 6 hours, As Needed -for moderate pain MDD:6   test strips (per patient&#x27;s insurance): 1 application subcutaneously 4 times a day. ** Compatible with patient&#x27;s glucometer **

## 2021-06-24 NOTE — BRIEF OPERATIVE NOTE - NSICDXBRIEFPROCEDURE_GEN_ALL_CORE_FT
PROCEDURES:  CABG, with CAMILO 24-Jun-2021 17:28:45 LIMA to LAD, RSVG to RI, PDA, LRAD to Husam Locke

## 2021-06-24 NOTE — BRIEF OPERATIVE NOTE - ADULT CT SURG CONDUIT HARVEST PERFORMED BY
Detail Level: Simple Additional Notes: Patient consent was obtained to proceed with the visit and recommended plan of care after discussion of all risks and benefits, including the risks of COVID-19 exposure. Yoshi LISA

## 2021-06-24 NOTE — DISCHARGE NOTE PROVIDER - CARE PROVIDERS DIRECT ADDRESSES
,DirectAddress_Unknown,DirectAddress_Unknown ,DirectAddress_Unknown,DirectAddress_Unknown,[email or web address]

## 2021-06-24 NOTE — DISCHARGE NOTE PROVIDER - PROVIDER TOKENS
PROVIDER:[TOKEN:[[id number]]],PROVIDER:[TOKEN:[[id number]]] PROVIDER:[TOKEN:[[id number]]],PROVIDER:[TOKEN:[[id number]]],PROVIDER:[TOKEN:[[id number]]]

## 2021-06-24 NOTE — DISCHARGE NOTE PROVIDER - CARE PROVIDER_API CALL
Weston Terrazas)  Internal Medicine  [address] [place] [address]  Phone: [phone]  Fax: [phone]  Follow Up Time:     Bhargav Tovar)  Surgery; Thoracic and Cardiac Surgery  [address] [place] [address]  Phone: [phone]  Fax: [phone]  Follow Up Time:    Weston Terrazas)  Internal Medicine  [address] [facility]  [place] [address]  Phone: [phone]  Fax: [phone]  Follow Up Time:     Bhargav Tovar)  Surgery; Thoracic and Cardiac Surgery  [address] [place] [address]  Phone: [phone]  Fax: [phone]  Follow Up Time:     Edilberto Lo)  Cardiovascular Disease; Internal Medicine; Interventional Cardiology  [address] [place] [address] [address]  [place] [address]  Phone: [phone]  Fax: [phone]  Follow Up Time:

## 2021-06-24 NOTE — PRE-OP CHECKLIST - SELECT TESTS ORDERED
BMP/CBC/CMP/Type and Cross/Type and Screen/Urinalysis/EKG/CXR/Results in MD note/POCT Blood Glucose/COVID-19

## 2021-06-24 NOTE — DISCHARGE NOTE PROVIDER - HOSPITAL COURSE
60y Male with PMH of BPH, DLD and DM presented to ED with Chest pain 48 hours ago. He ruled in for NSTEMI. Cardiac cath  revealed triple vessel disease.  On 06/24/21, he underwent surgical myocardial revascularization. Post-operatively, 60y Male with PMH of BPH, DLD and DM presented to ED with Chest pain 48 hours ago. He ruled in for NSTEMI. Cardiac cath  revealed triple vessel disease. Very poorly controlled diabetes with a HbA1c = 12.  Known CAD.  On 06/24/21, he underwent surgical myocardial revascularization. Intra op on CAMILO, starting EF was 40% and post op it was significantly improved. Post-operatively, 60y Male with PMH of BPH, DLD and DM presented to ED with Chest pain 48 hours ago. He ruled in for NSTEMI. Cardiac cath  revealed triple vessel disease. Very poorly controlled diabetes with a HbA1c = 12.  Known CAD.  On 06/24/21, he underwent surgical myocardial revascularization. Intra op on CAMILO, starting EF was 40% and post op it was significantly improved. Post-operatively, the patient had an uneventful hospital course and was treated with insulin for optimal glycemic control.  He was then discharged home in stable condition on POD # 5.

## 2021-06-25 LAB
ALBUMIN SERPL ELPH-MCNC: 3.7 G/DL — SIGNIFICANT CHANGE UP (ref 3.5–5.2)
ALBUMIN SERPL ELPH-MCNC: 3.9 G/DL — SIGNIFICANT CHANGE UP (ref 3.5–5.2)
ALP SERPL-CCNC: 44 U/L — SIGNIFICANT CHANGE UP (ref 30–115)
ALP SERPL-CCNC: 51 U/L — SIGNIFICANT CHANGE UP (ref 30–115)
ALT FLD-CCNC: 12 U/L — SIGNIFICANT CHANGE UP (ref 0–41)
ALT FLD-CCNC: 12 U/L — SIGNIFICANT CHANGE UP (ref 0–41)
ANION GAP SERPL CALC-SCNC: 10 MMOL/L — SIGNIFICANT CHANGE UP (ref 7–14)
ANION GAP SERPL CALC-SCNC: 10 MMOL/L — SIGNIFICANT CHANGE UP (ref 7–14)
ANION GAP SERPL CALC-SCNC: 9 MMOL/L — SIGNIFICANT CHANGE UP (ref 7–14)
APTT BLD: 30.5 SEC — SIGNIFICANT CHANGE UP (ref 27–39.2)
AST SERPL-CCNC: 27 U/L — SIGNIFICANT CHANGE UP (ref 0–41)
AST SERPL-CCNC: 36 U/L — SIGNIFICANT CHANGE UP (ref 0–41)
BASOPHILS # BLD AUTO: 0.01 K/UL — SIGNIFICANT CHANGE UP (ref 0–0.2)
BASOPHILS # BLD AUTO: 0.01 K/UL — SIGNIFICANT CHANGE UP (ref 0–0.2)
BASOPHILS NFR BLD AUTO: 0.1 % — SIGNIFICANT CHANGE UP (ref 0–1)
BASOPHILS NFR BLD AUTO: 0.1 % — SIGNIFICANT CHANGE UP (ref 0–1)
BILIRUB SERPL-MCNC: 1.3 MG/DL — HIGH (ref 0.2–1.2)
BILIRUB SERPL-MCNC: 1.7 MG/DL — HIGH (ref 0.2–1.2)
BUN SERPL-MCNC: 11 MG/DL — SIGNIFICANT CHANGE UP (ref 10–20)
BUN SERPL-MCNC: 11 MG/DL — SIGNIFICANT CHANGE UP (ref 10–20)
BUN SERPL-MCNC: 13 MG/DL — SIGNIFICANT CHANGE UP (ref 10–20)
CALCIUM SERPL-MCNC: 8.2 MG/DL — LOW (ref 8.5–10.1)
CALCIUM SERPL-MCNC: 8.4 MG/DL — LOW (ref 8.5–10.1)
CALCIUM SERPL-MCNC: 8.5 MG/DL — SIGNIFICANT CHANGE UP (ref 8.5–10.1)
CHLORIDE SERPL-SCNC: 102 MMOL/L — SIGNIFICANT CHANGE UP (ref 98–110)
CHLORIDE SERPL-SCNC: 103 MMOL/L — SIGNIFICANT CHANGE UP (ref 98–110)
CHLORIDE SERPL-SCNC: 105 MMOL/L — SIGNIFICANT CHANGE UP (ref 98–110)
CO2 SERPL-SCNC: 21 MMOL/L — SIGNIFICANT CHANGE UP (ref 17–32)
CO2 SERPL-SCNC: 21 MMOL/L — SIGNIFICANT CHANGE UP (ref 17–32)
CO2 SERPL-SCNC: 22 MMOL/L — SIGNIFICANT CHANGE UP (ref 17–32)
CREAT SERPL-MCNC: 0.6 MG/DL — LOW (ref 0.7–1.5)
CREAT SERPL-MCNC: 0.7 MG/DL — SIGNIFICANT CHANGE UP (ref 0.7–1.5)
CREAT SERPL-MCNC: 0.8 MG/DL — SIGNIFICANT CHANGE UP (ref 0.7–1.5)
EOSINOPHIL # BLD AUTO: 0 K/UL — SIGNIFICANT CHANGE UP (ref 0–0.7)
EOSINOPHIL # BLD AUTO: 0.01 K/UL — SIGNIFICANT CHANGE UP (ref 0–0.7)
EOSINOPHIL NFR BLD AUTO: 0 % — SIGNIFICANT CHANGE UP (ref 0–8)
EOSINOPHIL NFR BLD AUTO: 0.1 % — SIGNIFICANT CHANGE UP (ref 0–8)
GLUCOSE BLDC GLUCOMTR-MCNC: 122 MG/DL — HIGH (ref 70–99)
GLUCOSE BLDC GLUCOMTR-MCNC: 122 MG/DL — HIGH (ref 70–99)
GLUCOSE BLDC GLUCOMTR-MCNC: 128 MG/DL — HIGH (ref 70–99)
GLUCOSE BLDC GLUCOMTR-MCNC: 142 MG/DL — HIGH (ref 70–99)
GLUCOSE BLDC GLUCOMTR-MCNC: 151 MG/DL — HIGH (ref 70–99)
GLUCOSE BLDC GLUCOMTR-MCNC: 152 MG/DL — HIGH (ref 70–99)
GLUCOSE BLDC GLUCOMTR-MCNC: 168 MG/DL — HIGH (ref 70–99)
GLUCOSE BLDC GLUCOMTR-MCNC: 176 MG/DL — HIGH (ref 70–99)
GLUCOSE BLDC GLUCOMTR-MCNC: 178 MG/DL — HIGH (ref 70–99)
GLUCOSE BLDC GLUCOMTR-MCNC: 189 MG/DL — HIGH (ref 70–99)
GLUCOSE BLDC GLUCOMTR-MCNC: 192 MG/DL — HIGH (ref 70–99)
GLUCOSE BLDC GLUCOMTR-MCNC: 200 MG/DL — HIGH (ref 70–99)
GLUCOSE BLDC GLUCOMTR-MCNC: 79 MG/DL — SIGNIFICANT CHANGE UP (ref 70–99)
GLUCOSE BLDC GLUCOMTR-MCNC: 84 MG/DL — SIGNIFICANT CHANGE UP (ref 70–99)
GLUCOSE SERPL-MCNC: 119 MG/DL — HIGH (ref 70–99)
GLUCOSE SERPL-MCNC: 120 MG/DL — HIGH (ref 70–99)
GLUCOSE SERPL-MCNC: 66 MG/DL — LOW (ref 70–99)
HCT VFR BLD CALC: 28.2 % — LOW (ref 42–52)
HCT VFR BLD CALC: 28.8 % — LOW (ref 42–52)
HCT VFR BLD CALC: 32.8 % — LOW (ref 42–52)
HGB BLD-MCNC: 11.2 G/DL — LOW (ref 14–18)
HGB BLD-MCNC: 9.7 G/DL — LOW (ref 14–18)
HGB BLD-MCNC: 9.8 G/DL — LOW (ref 14–18)
IMM GRANULOCYTES NFR BLD AUTO: 0.5 % — HIGH (ref 0.1–0.3)
IMM GRANULOCYTES NFR BLD AUTO: 0.5 % — HIGH (ref 0.1–0.3)
INR BLD: 1.3 RATIO — SIGNIFICANT CHANGE UP (ref 0.65–1.3)
LYMPHOCYTES # BLD AUTO: 0.7 K/UL — LOW (ref 1.2–3.4)
LYMPHOCYTES # BLD AUTO: 1.17 K/UL — LOW (ref 1.2–3.4)
LYMPHOCYTES # BLD AUTO: 5.3 % — LOW (ref 20.5–51.1)
LYMPHOCYTES # BLD AUTO: 7.7 % — LOW (ref 20.5–51.1)
MAGNESIUM SERPL-MCNC: 2.1 MG/DL — SIGNIFICANT CHANGE UP (ref 1.8–2.4)
MAGNESIUM SERPL-MCNC: 2.1 MG/DL — SIGNIFICANT CHANGE UP (ref 1.8–2.4)
MAGNESIUM SERPL-MCNC: 2.4 MG/DL — SIGNIFICANT CHANGE UP (ref 1.8–2.4)
MCHC RBC-ENTMCNC: 28.6 PG — SIGNIFICANT CHANGE UP (ref 27–31)
MCHC RBC-ENTMCNC: 28.8 PG — SIGNIFICANT CHANGE UP (ref 27–31)
MCHC RBC-ENTMCNC: 29.6 PG — SIGNIFICANT CHANGE UP (ref 27–31)
MCHC RBC-ENTMCNC: 33.7 G/DL — SIGNIFICANT CHANGE UP (ref 32–37)
MCHC RBC-ENTMCNC: 34.1 G/DL — SIGNIFICANT CHANGE UP (ref 32–37)
MCHC RBC-ENTMCNC: 34.8 G/DL — SIGNIFICANT CHANGE UP (ref 32–37)
MCV RBC AUTO: 84.3 FL — SIGNIFICANT CHANGE UP (ref 80–94)
MCV RBC AUTO: 85 FL — SIGNIFICANT CHANGE UP (ref 80–94)
MCV RBC AUTO: 85.2 FL — SIGNIFICANT CHANGE UP (ref 80–94)
MONOCYTES # BLD AUTO: 1.19 K/UL — HIGH (ref 0.1–0.6)
MONOCYTES # BLD AUTO: 1.79 K/UL — HIGH (ref 0.1–0.6)
MONOCYTES NFR BLD AUTO: 11.7 % — HIGH (ref 1.7–9.3)
MONOCYTES NFR BLD AUTO: 9.1 % — SIGNIFICANT CHANGE UP (ref 1.7–9.3)
NEUTROPHILS # BLD AUTO: 11.14 K/UL — HIGH (ref 1.4–6.5)
NEUTROPHILS # BLD AUTO: 12.2 K/UL — HIGH (ref 1.4–6.5)
NEUTROPHILS NFR BLD AUTO: 79.9 % — HIGH (ref 42.2–75.2)
NEUTROPHILS NFR BLD AUTO: 85 % — HIGH (ref 42.2–75.2)
NRBC # BLD: 0 /100 WBCS — SIGNIFICANT CHANGE UP (ref 0–0)
PLATELET # BLD AUTO: 165 K/UL — SIGNIFICANT CHANGE UP (ref 130–400)
PLATELET # BLD AUTO: 176 K/UL — SIGNIFICANT CHANGE UP (ref 130–400)
PLATELET # BLD AUTO: 179 K/UL — SIGNIFICANT CHANGE UP (ref 130–400)
POTASSIUM SERPL-MCNC: 3.7 MMOL/L — SIGNIFICANT CHANGE UP (ref 3.5–5)
POTASSIUM SERPL-MCNC: 3.8 MMOL/L — SIGNIFICANT CHANGE UP (ref 3.5–5)
POTASSIUM SERPL-MCNC: 4.6 MMOL/L — SIGNIFICANT CHANGE UP (ref 3.5–5)
POTASSIUM SERPL-SCNC: 3.7 MMOL/L — SIGNIFICANT CHANGE UP (ref 3.5–5)
POTASSIUM SERPL-SCNC: 3.8 MMOL/L — SIGNIFICANT CHANGE UP (ref 3.5–5)
POTASSIUM SERPL-SCNC: 4.6 MMOL/L — SIGNIFICANT CHANGE UP (ref 3.5–5)
PROT SERPL-MCNC: 5.1 G/DL — LOW (ref 6–8)
PROT SERPL-MCNC: 5.2 G/DL — LOW (ref 6–8)
PROTHROM AB SERPL-ACNC: 15 SEC — HIGH (ref 9.95–12.87)
RBC # BLD: 3.31 M/UL — LOW (ref 4.7–6.1)
RBC # BLD: 3.39 M/UL — LOW (ref 4.7–6.1)
RBC # BLD: 3.89 M/UL — LOW (ref 4.7–6.1)
RBC # FLD: 12.7 % — SIGNIFICANT CHANGE UP (ref 11.5–14.5)
RBC # FLD: 12.9 % — SIGNIFICANT CHANGE UP (ref 11.5–14.5)
RBC # FLD: 13 % — SIGNIFICANT CHANGE UP (ref 11.5–14.5)
SODIUM SERPL-SCNC: 133 MMOL/L — LOW (ref 135–146)
SODIUM SERPL-SCNC: 134 MMOL/L — LOW (ref 135–146)
SODIUM SERPL-SCNC: 136 MMOL/L — SIGNIFICANT CHANGE UP (ref 135–146)
WBC # BLD: 13.1 K/UL — HIGH (ref 4.8–10.8)
WBC # BLD: 15.26 K/UL — HIGH (ref 4.8–10.8)
WBC # BLD: 15.54 K/UL — HIGH (ref 4.8–10.8)
WBC # FLD AUTO: 13.1 K/UL — HIGH (ref 4.8–10.8)
WBC # FLD AUTO: 15.26 K/UL — HIGH (ref 4.8–10.8)
WBC # FLD AUTO: 15.54 K/UL — HIGH (ref 4.8–10.8)

## 2021-06-25 PROCEDURE — 99232 SBSQ HOSP IP/OBS MODERATE 35: CPT

## 2021-06-25 PROCEDURE — 93010 ELECTROCARDIOGRAM REPORT: CPT

## 2021-06-25 PROCEDURE — 71045 X-RAY EXAM CHEST 1 VIEW: CPT | Mod: 26

## 2021-06-25 RX ORDER — ATORVASTATIN CALCIUM 80 MG/1
20 TABLET, FILM COATED ORAL AT BEDTIME
Refills: 0 | Status: DISCONTINUED | OUTPATIENT
Start: 2021-06-25 | End: 2021-06-29

## 2021-06-25 RX ORDER — HEPARIN SODIUM 5000 [USP'U]/ML
3000 INJECTION INTRAVENOUS; SUBCUTANEOUS EVERY 12 HOURS
Refills: 0 | Status: DISCONTINUED | OUTPATIENT
Start: 2021-06-25 | End: 2021-06-29

## 2021-06-25 RX ORDER — AMIODARONE HYDROCHLORIDE 400 MG/1
1 TABLET ORAL
Qty: 900 | Refills: 0 | Status: DISCONTINUED | OUTPATIENT
Start: 2021-06-25 | End: 2021-06-27

## 2021-06-25 RX ORDER — SODIUM CHLORIDE 9 MG/ML
500 INJECTION, SOLUTION INTRAVENOUS
Refills: 0 | Status: DISCONTINUED | OUTPATIENT
Start: 2021-06-25 | End: 2021-06-27

## 2021-06-25 RX ORDER — IPRATROPIUM BROMIDE 0.2 MG/ML
500 SOLUTION, NON-ORAL INHALATION EVERY 6 HOURS
Refills: 0 | Status: DISCONTINUED | OUTPATIENT
Start: 2021-06-25 | End: 2021-06-29

## 2021-06-25 RX ORDER — ALBUMIN HUMAN 25 %
500 VIAL (ML) INTRAVENOUS ONCE
Refills: 0 | Status: COMPLETED | OUTPATIENT
Start: 2021-06-25 | End: 2021-06-25

## 2021-06-25 RX ORDER — SODIUM BICARBONATE 1 MEQ/ML
50 SYRINGE (ML) INTRAVENOUS ONCE
Refills: 0 | Status: COMPLETED | OUTPATIENT
Start: 2021-06-25 | End: 2021-06-25

## 2021-06-25 RX ORDER — FAMOTIDINE 10 MG/ML
20 INJECTION INTRAVENOUS
Refills: 0 | Status: DISCONTINUED | OUTPATIENT
Start: 2021-06-25 | End: 2021-06-29

## 2021-06-25 RX ADMIN — Medication 250 MILLIGRAM(S): at 18:20

## 2021-06-25 RX ADMIN — Medication 5 MILLIGRAM(S): at 06:14

## 2021-06-25 RX ADMIN — Medication 250 MILLIGRAM(S): at 05:36

## 2021-06-25 RX ADMIN — Medication 1000 MILLIGRAM(S): at 12:00

## 2021-06-25 RX ADMIN — Medication 50 MILLIEQUIVALENT(S): at 06:35

## 2021-06-25 RX ADMIN — SODIUM CHLORIDE 500 MILLILITER(S): 9 INJECTION, SOLUTION INTRAVENOUS at 03:55

## 2021-06-25 RX ADMIN — OXYCODONE HYDROCHLORIDE 10 MILLIGRAM(S): 5 TABLET ORAL at 16:19

## 2021-06-25 RX ADMIN — Medication 400 MILLIGRAM(S): at 11:31

## 2021-06-25 RX ADMIN — AMIODARONE HYDROCHLORIDE 33.3 MG/MIN: 400 TABLET ORAL at 08:30

## 2021-06-25 RX ADMIN — Medication 400 MILLIGRAM(S): at 06:00

## 2021-06-25 RX ADMIN — OXYCODONE HYDROCHLORIDE 10 MILLIGRAM(S): 5 TABLET ORAL at 08:55

## 2021-06-25 RX ADMIN — CHLORHEXIDINE GLUCONATE 1 APPLICATION(S): 213 SOLUTION TOPICAL at 05:36

## 2021-06-25 RX ADMIN — Medication 100 MILLIEQUIVALENT(S): at 00:01

## 2021-06-25 RX ADMIN — Medication 100 MILLIGRAM(S): at 05:35

## 2021-06-25 RX ADMIN — CHLORHEXIDINE GLUCONATE 15 MILLILITER(S): 213 SOLUTION TOPICAL at 05:35

## 2021-06-25 RX ADMIN — FAMOTIDINE 20 MILLIGRAM(S): 10 INJECTION INTRAVENOUS at 18:46

## 2021-06-25 RX ADMIN — FAMOTIDINE 20 MILLIGRAM(S): 10 INJECTION INTRAVENOUS at 05:35

## 2021-06-25 RX ADMIN — Medication 400 MILLIGRAM(S): at 23:05

## 2021-06-25 RX ADMIN — Medication 500 MICROGRAM(S): at 20:09

## 2021-06-25 RX ADMIN — Medication 400 MILLIGRAM(S): at 18:20

## 2021-06-25 RX ADMIN — Medication 5 MILLIGRAM(S): at 18:20

## 2021-06-25 RX ADMIN — OXYCODONE HYDROCHLORIDE 10 MILLIGRAM(S): 5 TABLET ORAL at 09:30

## 2021-06-25 RX ADMIN — Medication 250 MILLILITER(S): at 02:30

## 2021-06-25 RX ADMIN — Medication 1000 MILLIGRAM(S): at 06:15

## 2021-06-25 RX ADMIN — Medication 1000 MILLIGRAM(S): at 18:45

## 2021-06-25 RX ADMIN — Medication 100 MILLIGRAM(S): at 13:01

## 2021-06-25 RX ADMIN — OXYCODONE HYDROCHLORIDE 10 MILLIGRAM(S): 5 TABLET ORAL at 16:34

## 2021-06-25 RX ADMIN — Medication 325 MILLIGRAM(S): at 11:28

## 2021-06-25 RX ADMIN — Medication 1000 MILLIGRAM(S): at 23:15

## 2021-06-25 RX ADMIN — Medication 100 MILLIGRAM(S): at 22:29

## 2021-06-25 RX ADMIN — HEPARIN SODIUM 3000 UNIT(S): 5000 INJECTION INTRAVENOUS; SUBCUTANEOUS at 18:46

## 2021-06-25 RX ADMIN — POLYETHYLENE GLYCOL 3350 17 GRAM(S): 17 POWDER, FOR SOLUTION ORAL at 11:29

## 2021-06-25 RX ADMIN — ATORVASTATIN CALCIUM 20 MILLIGRAM(S): 80 TABLET, FILM COATED ORAL at 22:31

## 2021-06-25 NOTE — PROGRESS NOTE ADULT - SUBJECTIVE AND OBJECTIVE BOX
CHRISTI CRABTREE  MRN#: [id number]  Subjective:  Patient was seen and evalauted on AM rounds offerring no specific compliants at this time.    OBJECTIVE:  ICU Vital Signs Last 24 Hrs  T(C): 36.6 (2021 09:00), Max: 37.2 (2021 06:00)  T(F): 97.9 (2021 09:00), Max: 99 (2021 06:00)  HR: 92 (2021 13:00) (85 - 102)  BP: 84/52 (2021 08:00) (81/53 - 110/68)  BP(mean): 62 (2021 08:00) (62 - 91)  ABP: 100/47 (2021 13:00) (89/52 - 131/59)  ABP(mean): 62 (2021 13:00) (61 - 87)  RR: 26 (:) (0 - 40)  SpO2: 94% (:) (94% - 100%)       @ 07:  -   @ 07:00  --------------------------------------------------------  IN: 2739.6 mL / OUT: 2005 mL / NET: 734.6 mL     @ 07: @ 13:28  --------------------------------------------------------  IN: 248.6 mL / OUT: 465 mL / NET: -216.4 mL      CAPILLARY BLOOD GLUCOSE      POCT Blood Glucose.: 142 mg/dL (2021 12:40)      PHYSICAL EXAM:Daily Height in cm: 177.8 (2021 09:47)    Daily Weight in k.7 (2021 06:00)  General: WN/WD NAD    HEENT:     + NCAT  + EOMI  - Conjuctival edema   - Icterus   - Thrush   - ETT  - NGT/OGT    Neck:         + FROM    - JVD     - Nodes     - Masses    + Mid-line trachea   - Tracheostomy    Chest:         - Sternal click  - Sternal drainage  + Pacing wires  + Chest tubes  - SubQ emphysema    Lungs:          + CTA   - Rhonchi    - Rales    - Wheezing     - Decreased BS   - Dullness R L    Cardiac:       + S1 + S2    + RRR   - Irregular   - S3  - S4    - Murmurs   - Rub   - Hamman’s sign     Abdomen:    + BS     + Soft    + Non-tender     - Distended    - Organomegaly  - PEG    Extremities:   - Cyanosis U/L   - Clubbing  U/L  - LE/UE Edema   + Capillary refill    + Pulses     Neuro:        + Awake   +  Alert   - Confused   - Lethargic   - Sedated   - Generalized Weakness    Skin:        - Rashes    - Erythema   + Normal incisions   + IV sites intact  - Sacral decubitus    HOSPITAL MEDICATIONS:  MEDICATIONS  (STANDING):  acetaminophen  IVPB .. 1000 milliGRAM(s) IV Intermittent once  acetaminophen  IVPB .. 1000 milliGRAM(s) IV Intermittent once  aMIOdarone Infusion 1 mG/Min (33.3 mL/Hr) IV Continuous <Continuous>  aspirin enteric coated 325 milliGRAM(s) Oral daily  atorvastatin 20 milliGRAM(s) Oral at bedtime  bisacodyl 5 milliGRAM(s) Oral every 12 hours  ceFAZolin   IVPB 1000 milliGRAM(s) IV Intermittent every 8 hours  chlorhexidine 4% Liquid 1 Application(s) Topical <User Schedule>  dextrose 50% Injectable 50 milliLiter(s) IV Push every 15 minutes  dextrose 50% Injectable 25 milliLiter(s) IV Push every 15 minutes  DOBUTamine Infusion 0.5 MICROgram(s)/kG/Min (1.44 mL/Hr) IV Continuous <Continuous>  famotidine    Tablet 20 milliGRAM(s) Oral two times a day  heparin   Injectable 3000 Unit(s) SubCutaneous every 12 hours  insulin regular Infusion 1 Unit(s)/Hr (1 mL/Hr) IV Continuous <Continuous>  ipratropium    for Nebulization 500 MICROGram(s) Nebulizer every 6 hours  meperidine     Injectable 25 milliGRAM(s) IV Push once  multiple electrolytes Injection Type 1 500 milliLiter(s) (500 mL/Hr) IV Continuous <Continuous>  nitroglycerin  Infusion 5 MICROgram(s)/Min (1.5 mL/Hr) IV Continuous <Continuous>  polyethylene glycol 3350 17 Gram(s) Oral daily  sodium chloride 0.9%. 1000 milliLiter(s) (20 mL/Hr) IV Continuous <Continuous>  vancomycin  IVPB 1000 milliGRAM(s) IV Intermittent every 12 hours    MEDICATIONS  (PRN):  ondansetron  IVPB 4 milliGRAM(s) IV Intermittent once PRN Nausea and/or Vomiting  oxyCODONE    IR 10 milliGRAM(s) Oral every 4 hours PRN Severe Pain (7 - 10)  oxyCODONE    IR 5 milliGRAM(s) Oral every 4 hours PRN Moderate Pain (4 - 6)      LABS:                        11.2   13.10 )-----------( 165      ( 2021 02:35 )             32.8    06-    136  |  105  |  13  ----------------------------<  66<L>  4.6   |  22  |  0.6<L>    Ca    8.5      2021 02:35  Mg     2.4     06-25    TPro  5.1<L>  /  Alb  3.7  /  TBili  1.7<H>  /  DBili  x   /  AST  36  /  ALT  12  /  AlkPhos  51  06-25    PT/INR - ( 2021 02:35 )   PT: 15.00 sec;   INR: 1.30 ratio         PTT - ( 2021 02:35 )  PTT:30.5 sec LIVER FUNCTIONS - ( 2021 02:35 )  Alb: 3.7 g/dL / Pro: 5.1 g/dL / ALK PHOS: 51 U/L / ALT: 12 U/L / AST: 36 U/L / GGT: x               RADIOLOGY:  X Reviewed and interpreted by me: (-) consolidation, (-) PTX    CARDIOPULMONARY DYSFUNCTION  - Respiratory status required supplemental oxygen & the following of continuous pulse oximetry for support & to prevent decompensation  - Continued early mobilization as tolerated  - Addressed analgesic regimen to optimize function    PREVENTION-PROPHYLAXIS  - ASA continued for graft occlusion-thromboembolism prophylaxis  - Lipitor was also started for long term graft patency  - Heparin initiated for VTE prophylaxis in addition to Venodyne boots  - Protonix maintained for GI bleeding prophylaxis  - Metabolic stability & infection prophylaxis required review and adjustment of regular Insulin sliding scale and gylcemic regimen while following serial glucose levels to help achieve & maintain euglycemia  - Reviewed & addressed surgical site infection prophylaxis regimen

## 2021-06-25 NOTE — PHYSICAL THERAPY INITIAL EVALUATION ADULT - PERTINENT HX OF CURRENT PROBLEM, REHAB EVAL
Pt adm for retrostinal chest pain, s/p CABGX4. Pt's pressure around 90/50, cleared by MD to ambulate. Pt reports no symptoms of dizziness, sweating.

## 2021-06-25 NOTE — PROGRESS NOTE ADULT - SUBJECTIVE AND OBJECTIVE BOX
Patient is a 60y old  Male who presents with a chief complaint of chest pain (25 Jun 2021 08:37)          SUBJ:  Patient seen and examined. S/p CABG x 4. Extubated. On minimal pressor support, being weaned off.      MEDICATIONS  (STANDING):  acetaminophen  IVPB .. 1000 milliGRAM(s) IV Intermittent once  acetaminophen  IVPB .. 1000 milliGRAM(s) IV Intermittent once  acetaminophen  IVPB .. 1000 milliGRAM(s) IV Intermittent once  aMIOdarone Infusion 1 mG/Min (33.3 mL/Hr) IV Continuous <Continuous>  aspirin enteric coated 325 milliGRAM(s) Oral daily  bisacodyl 5 milliGRAM(s) Oral every 12 hours  ceFAZolin   IVPB 1000 milliGRAM(s) IV Intermittent every 8 hours  chlorhexidine 0.12% Liquid 5 milliLiter(s) Oral Mucosa two times a day  chlorhexidine 0.12% Liquid 15 milliLiter(s) Oral Mucosa every 12 hours  chlorhexidine 4% Liquid 1 Application(s) Topical <User Schedule>  dexMEDEtomidine Infusion 0.25 MICROgram(s)/kG/Hr (6.01 mL/Hr) IV Continuous <Continuous>  dextrose 50% Injectable 50 milliLiter(s) IV Push every 15 minutes  dextrose 50% Injectable 25 milliLiter(s) IV Push every 15 minutes  DOBUTamine Infusion 0.5 MICROgram(s)/kG/Min (1.44 mL/Hr) IV Continuous <Continuous>  famotidine Injectable 20 milliGRAM(s) IV Push every 12 hours  insulin regular Infusion 1 Unit(s)/Hr (1 mL/Hr) IV Continuous <Continuous>  meperidine     Injectable 25 milliGRAM(s) IV Push once  multiple electrolytes Injection Type 1 500 milliLiter(s) (500 mL/Hr) IV Continuous <Continuous>  niCARdipine Infusion 5 mG/Hr (25 mL/Hr) IV Continuous <Continuous>  nitroglycerin  Infusion 5 MICROgram(s)/Min (1.5 mL/Hr) IV Continuous <Continuous>  norepinephrine Infusion 0.05 MICROgram(s)/kG/Min (9.01 mL/Hr) IV Continuous <Continuous>  polyethylene glycol 3350 17 Gram(s) Oral daily  propofol Infusion 15 MICROgram(s)/kG/Min (8.65 mL/Hr) IV Continuous <Continuous>  sodium chloride 0.9%. 1000 milliLiter(s) (20 mL/Hr) IV Continuous <Continuous>  vancomycin  IVPB 1000 milliGRAM(s) IV Intermittent every 12 hours    MEDICATIONS  (PRN):  ondansetron  IVPB 4 milliGRAM(s) IV Intermittent once PRN Nausea and/or Vomiting  oxyCODONE    IR 10 milliGRAM(s) Oral every 4 hours PRN Severe Pain (7 - 10)  oxyCODONE    IR 5 milliGRAM(s) Oral every 4 hours PRN Moderate Pain (4 - 6)            Vital Signs Last 24 Hrs  T(C): 37.2 (25 Jun 2021 07:00), Max: 37.2 (25 Jun 2021 06:00)  T(F): 99 (25 Jun 2021 07:00), Max: 99 (25 Jun 2021 06:00)  HR: 102 (25 Jun 2021 08:00) (85 - 102)  BP: 84/52 (25 Jun 2021 08:00) (81/53 - 110/68)  BP(mean): 62 (25 Jun 2021 08:00) (62 - 91)  RR: 35 (25 Jun 2021 08:00) (0 - 35)  SpO2: 100% (25 Jun 2021 08:00) (96% - 100%)      PHYSICAL EXAM:    GEN: AAO x 3, NAD  HEENT: NC/AT, PERRL  Neck: No JVD, RIJ PA catheter  CV: Reg, S1-S2, no murmur  Lungs: CTAB  Abd: Soft, non-tender  Ext: No edema        06-24-21 @ 07:01  -  06-25-21 @ 07:00  --------------------------------------------------------  IN: 2739.6 mL / OUT: 2005 mL / NET: 734.6 mL        I&O's Summary    24 Jun 2021 07:01  -  25 Jun 2021 07:00  --------------------------------------------------------  IN: 2739.6 mL / OUT: 2005 mL / NET: 734.6 mL    	        ECG:  < from: 12 Lead ECG (06.24.21 @ 17:46) >  Normal sinus rhythm  Prolonged QT  Abnormal ECG    < end of copied text >    TTE:  < from: TTE Echo Complete w/o Contrast w/ Doppler (06.21.21 @ 09:12) >  Summary:   1. LV Ejection Fraction by Scales's Method with a biplane EF of 62 %.   2. Normal left ventricular size and wall thicknesses, with normal systolic and diastolic function.   3. Mild dilatation of the ascending aorta (4.0 cm).    < end of copied text >      LABS:                        11.2   13.10 )-----------( 165      ( 25 Jun 2021 02:35 )             32.8     06-25    136  |  105  |  13  ----------------------------<  66<L>  4.6   |  22  |  0.6<L>    Ca    8.5      25 Jun 2021 02:35  Mg     2.4     06-25    TPro  5.1<L>  /  Alb  3.7  /  TBili  1.7<H>  /  DBili  x   /  AST  36  /  ALT  12  /  AlkPhos  51  06-25        PT/INR - ( 25 Jun 2021 02:35 )   PT: 15.00 sec;   INR: 1.30 ratio         PTT - ( 25 Jun 2021 02:35 )  PTT:30.5 sec      BNP  RADIOLOGY & ADDITIONAL STUDIES:      IMPRESSION AND PLAN:

## 2021-06-25 NOTE — PHYSICAL THERAPY INITIAL EVALUATION ADULT - GENERAL OBSERVATIONS, REHAB EVAL
Pt seen [phone] for a total of 30 minutes. Pt encountered supine in bed, no apparent distress, agreeable to PT. Pt left sitting in bedside chair +chair alarm, no apparent distress, call bell/tv/phone in reach, tray table in front, all needs met. +sp02 monitor + tele + BP Cuff + A Line, + hunter + 2 Chest tubes + IV.

## 2021-06-25 NOTE — PROGRESS NOTE ADULT - ASSESSMENT
Multivessel CAD  Unstable angina  Preserved LV function  Now s/p CABG x 4    C/w supportive care  Wean off Levophed as tolerated  C/w ASA. Add Statin  DM control with insulin.

## 2021-06-25 NOTE — PROGRESS NOTE ADULT - SUBJECTIVE AND OBJECTIVE BOX
OPERATIVE PROCEDURE(s):                POD #                       60yMale  SURGEON(s): BEBA Tovar  SUBJECTIVE ASSESSMENT:   Vital Signs Last 24 Hrs  T(F): 99 (2021 07:00), Max: 99 (2021 06:00)  HR: 102 (2021 08:00) (85 - 102)  BP: 84/52 (2021 08:00) (81/53 - 110/68)  BP(mean): 62 (2021 08:00) (62 - 91)  ABP: 91/50 (2021 08:00) (89/52 - 131/59)  ABP(mean): 62 (2021 08:00)  RR: 35 (2021 08:00) (0 - 35)  SpO2: 100% (2021 08:00) (96% - 100%)  CVP(mm Hg): --  CVP(cm H2O): --  CO: 8.4 (2021 06:00)  CI: 3.7 (2021 06:00)  PA: --  SVR: --  Mode: CPAP with PS  FiO2: 40  PEEP: 5  PS: 5  MAP: 8    I&O's Detail    2021 07:01  -  2021 07:00  --------------------------------------------------------  IN:    Albumin 5%  - 500 mL: 500 mL    Dexmedetomidine: 116.2 mL    DOBUTamine: 144.9 mL    Insulin: 41 mL    IV PiggyBack: 1000 mL    multiple electrolytes Injection Type 1.: 500 mL    NiCARdipine: 97.5 mL    Norepinephrine: 40 mL    sodium chloride 0.9%: 300 mL  Total IN: 2739.6 mL    OUT:    Chest Tube (mL): 195 mL    Chest Tube (mL): 460 mL    Indwelling Catheter - Urethral (mL): 1350 mL    Nasogastric/Oral tube (mL): 0 mL  Total OUT: 2005 mL        Net:   I&O's Detail    2021 07:01  -  2021 07:00  --------------------------------------------------------  Total NET: 145 mL      2021 07:01  -  2021 07:00  --------------------------------------------------------  Total NET: 734.6 mL        CAPILLARY BLOOD GLUCOSE      POCT Blood Glucose.: 168 mg/dL (2021 06:54)  POCT Blood Glucose.: 192 mg/dL (2021 06:25)  POCT Blood Glucose.: 128 mg/dL (2021 03:47)  POCT Blood Glucose.: 122 mg/dL (2021 02:19)  POCT Blood Glucose.: 79 mg/dL (2021 00:59)  POCT Blood Glucose.: 84 mg/dL (2021 00:08)  POCT Blood Glucose.: 97 mg/dL (2021 23:01)  POCT Blood Glucose.: 117 mg/dL (2021 22:06)  POCT Blood Glucose.: 143 mg/dL (2021 20:54)  POCT Blood Glucose.: 170 mg/dL (2021 20:01)  POCT Blood Glucose.: 177 mg/dL (2021 18:50)    Physical Exam:  General: NAD; A&Ox3/Patient is intubated and sedated  Cardiac: S1/S2, RRR, no murmur, no rubs  Lungs: unlabored respirations, CTA b/l, no wheeze, no rales, no crackles  Abdomen: Soft/NT/ND; positive bowel sounds x 4  Sternum: Intact, no click, incision healing well with no drainage  Incisions: Incisions clean/dry/intact  Extremities: No edema b/l lower extremities; good capillary refill; no cyanosis; palpable 1+ pedal pulses b/l    Central Venous Catheter: Yes[]  No[] , If Yes indication:           Day #  Gill[place]and Catheter: Yes  [] , No  [] , If yes indication:                      Day #  NGT: Yes [] No [] ,    If Yes Placement:                                     Day #  EPICARDIAL WIRES:  [] YES [] NO                                              Day #  BOWEL MOVEMENT:  [] YES [] NO, If No, Timing since last BM:      Day #  CHEST TUBE(Left/Right):  [] YES [] NO, If yes -  AIR LEAKS:  [] YES [] NO        LABS:                        11.2<L>  13.10<H> )-----------( 165      ( 2021 02:35 )             32.8<L>                        11.5<L>  14.86<H> )-----------( 133      ( 2021 18:02 )             33.2<L>    06    136  |  105  |  13  ----------------------------<  66<L>  4.6   |  22  |  0.6<L>  06-24    138  |  106  |  15  ----------------------------<  144<H>  4.8   |  22  |  0.7    Ca    8.5      2021 02:35  Mg     2.4     06-25    TPro  5.1<L> [6.0 - 8.0]  /  Alb  3.7 [3.5 - 5.2]  /  TBili  1.7<H> [0.2 - 1.2]  /  DBili  x   /  AST  36 [0 - 41]  /  ALT  12 [0 - 41]  /  AlkPhos  51 [30 - 115]  06-25    PT/INR - ( 2021 02:35 )   PT: ;   INR: 1.30 ratio         PT/INR - ( 2021 18:02 )   PT: ;   INR: 1.27 ratio         PTT - ( 2021 02:35 )  PTT:30.5 sec, PTT - ( 2021 18:02 )  PTT:36.6 sec  Urinalysis Basic - ( 2021 16:17 )    Color: Light Yellow / Appearance: Clear / S.032 / pH: x  Gluc: x / Ketone: Small  / Bili: Negative / Urobili: <2 mg/dL   Blood: x / Protein: Trace / Nitrite: Negative   Leuk Esterase: Small / RBC: 2 /HPF / WBC 10 /HPF   Sq Epi: x / Non Sq Epi: 1 /HPF / Bacteria: Negative      ABG - ( 2021 03:45 )  pH: 7.37  /  pCO2: 34    /  pO2: 105   / HCO3: 20    / Base Excess: -5.0  /  SaO2: 99    /  LA: 0.6              RADIOLOGY & ADDITIONAL TESTS:  CXR:  EKG:  MEDICATIONS  (STANDING):  acetaminophen  IVPB .. 1000 milliGRAM(s) IV Intermittent once  acetaminophen  IVPB .. 1000 milliGRAM(s) IV Intermittent once  acetaminophen  IVPB .. 1000 milliGRAM(s) IV Intermittent once  aMIOdarone Infusion 1 mG/Min (33.3 mL/Hr) IV Continuous <Continuous>  aspirin enteric coated 325 milliGRAM(s) Oral daily  bisacodyl 5 milliGRAM(s) Oral every 12 hours  ceFAZolin   IVPB 1000 milliGRAM(s) IV Intermittent every 8 hours  chlorhexidine 0.12% Liquid 5 milliLiter(s) Oral Mucosa two times a day  chlorhexidine 0.12% Liquid 15 milliLiter(s) Oral Mucosa every 12 hours  chlorhexidine 4% Liquid 1 Application(s) Topical <User Schedule>  dexMEDEtomidine Infusion 0.25 MICROgram(s)/kG/Hr (6.01 mL/Hr) IV Continuous <Continuous>  dextrose 50% Injectable 50 milliLiter(s) IV Push every 15 minutes  dextrose 50% Injectable 25 milliLiter(s) IV Push every 15 minutes  DOBUTamine Infusion 0.5 MICROgram(s)/kG/Min (1.44 mL/Hr) IV Continuous <Continuous>  famotidine Injectable 20 milliGRAM(s) IV Push every 12 hours  insulin regular Infusion 1 Unit(s)/Hr (1 mL/Hr) IV Continuous <Continuous>  meperidine     Injectable 25 milliGRAM(s) IV Push once  multiple electrolytes Injection Type 1 500 milliLiter(s) (500 mL/Hr) IV Continuous <Continuous>  niCARdipine Infusion 5 mG/Hr (25 mL/Hr) IV Continuous <Continuous>  nitroglycerin  Infusion 5 MICROgram(s)/Min (1.5 mL/Hr) IV Continuous <Continuous>  norepinephrine Infusion 0.05 MICROgram(s)/kG/Min (9.01 mL/Hr) IV Continuous <Continuous>  polyethylene glycol 3350 17 Gram(s) Oral daily  propofol Infusion 15 MICROgram(s)/kG/Min (8.65 mL/Hr) IV Continuous <Continuous>  sodium chloride 0.9%. 1000 milliLiter(s) (20 mL/Hr) IV Continuous <Continuous>  vancomycin  IVPB 1000 milliGRAM(s) IV Intermittent every 12 hours    MEDICATIONS  (PRN):  ondansetron  IVPB 4 milliGRAM(s) IV Intermittent once PRN Nausea and/or Vomiting  oxyCODONE    IR 10 milliGRAM(s) Oral every 4 hours PRN Severe Pain (7 - 10)  oxyCODONE    IR 5 milliGRAM(s) Oral every 4 hours PRN Moderate Pain (4 - 6)    HEPARIN:  [] YES [] NO  Dose: XX UNITS/HR UNITS Q8H  LOVENOX:[] YES [] NO  Dose: XX mg Q24H  COUMADIN: []  YES [] NO  Dose: XX mg  Q24H  SCD's: YES b/l  GI Prophylaxis: Protonix [], Pepcid [], None [], (Contra-indication:.....)    Post-Op Beta-Blockers: Yes [], No[], If No, then contraindication:  Post-Op Aspirin: Yes [],  No [], If No, then contraindication:  Post-Op Statin: Yes [], No[], If No, then contraindication:  Allergies    No Known Allergies    Intolerances      Ambulation/Activity Status:    Assessment/Plan:  60y Male status-post .....  - Case and plan discussed with CTU Intensivist and CT Surgeon - Dr. Wilkins/Jesi/Guy  - Continue CTU supportive care    - Continue DVT/GI prophylaxis  - Incentive Spirometry 10 times an hour  - Continue to advance physical activity as tolerated and continue PT/OT as directed  1. CAD: Continue ASA, statin, BB  2. HTN:   3. A. Fib:   4. COPD/Hypoxia:   5. DM/Glucose Control:     Social Service Disposition:     OPERATIVE PROCEDURE(s):      CABGx4 + radial harvest          POD #  1                     60yMale  SURGEON(s): BEBA Tovar  SUBJECTIVE ASSESSMENT: pt seen and examined. no acute events   Vital Signs Last 24 Hrs  T(F): 99 (2021 07:00), Max: 99 (2021 06:00)  HR: 102 (2021 08:00) (85 - 102)  BP: 84/52 (2021 08:00) (81/53 - 110/68)  BP(mean): 62 (2021 08:00) (62 - 91)  ABP: 91/50 (2021 08:00) (89/52 - 131/59)  ABP(mean): 62 (2021 08:00)  RR: 35 (2021 08:00) (0 - 35)  SpO2: 100% (2021 08:00) (96% - 100%)  CO: 8.4 (2021 06:00)  CI: 3.7 (2021 06:00)  Mode: CPAP with PS  FiO2: 40  PEEP: 5  PS: 5  MAP: 8    I&O's Detail    2021 07:01  -  2021 07:00  --------------------------------------------------------  IN:    Albumin 5%  - 500 mL: 500 mL    Dexmedetomidine: 116.2 mL    DOBUTamine: 144.9 mL    Insulin: 41 mL    IV PiggyBack: 1000 mL    multiple electrolytes Injection Type 1.: 500 mL    NiCARdipine: 97.5 mL    Norepinephrine: 40 mL    sodium chloride 0.9%: 300 mL  Total IN: 2739.6 mL    OUT:    Chest Tube (mL): 195 mL    Chest Tube (mL): 460 mL    Indwelling Catheter - Urethral (mL): 1350 mL    Nasogastric/Oral tube (mL): 0 mL  Total OUT: 2005 mL        Net:   I&O's Detail    2021 07:01  -  2021 07:00  --------------------------------------------------------  Total NET: 145 mL      2021 07:01  -  2021 07:00  --------------------------------------------------------  Total NET: 734.6 mL        CAPILLARY BLOOD GLUCOSE      POCT Blood Glucose.: 168 mg/dL (2021 06:54)  POCT Blood Glucose.: 192 mg/dL (2021 06:25)  POCT Blood Glucose.: 128 mg/dL (2021 03:47)  POCT Blood Glucose.: 122 mg/dL (2021 02:19)  POCT Blood Glucose.: 79 mg/dL (2021 00:59)  POCT Blood Glucose.: 84 mg/dL (2021 00:08)  POCT Blood Glucose.: 97 mg/dL (2021 23:01)  POCT Blood Glucose.: 117 mg/dL (2021 22:06)  POCT Blood Glucose.: 143 mg/dL (2021 20:54)  POCT Blood Glucose.: 170 mg/dL (2021 20:01)  POCT Blood Glucose.: 177 mg/dL (2021 18:50)    Physical Exam:  General: NAD; A&Ox3  Cardiac: S1/S2, RRR, no murmur, no rubs  Lungs: decreased bs at bases bilaterally   Abdomen: Soft/NT/ND; positive bowel sounds x 4  Sternum: Intact, no click, incision healing well with no drainage  Incisions: Incisions clean/dry/intact  Extremities: No edema b/l lower extremities; good capillary refill; no cyanosis; palpable 1+ pedal pulses b/l    Central Venous Catheter: Yes[x]  No[] , If Yes indication:       hd unstable      Day #1  Gilliland Catheter: Yes  [x] , No  [] , If yes indication:  strict i.o                  Day #1  NGT: Yes [] No [x] ,    If Yes Placement:                                     Day #  EPICARDIAL WIRES:  [x] YES [] NO                                              Day #1  BOWEL MOVEMENT:  [] YES [x] NO, If No, Timing since last BM:      Day #  CHEST TUBE(Left/Right):  [x] YES [] NO, If yes -  AIR LEAKS:  [] YES [] NO        LABS:                        11.2<L>  13.10<H> )-----------( 165      ( 2021 02:35 )             32.8<L>                        11.5<L>  14.86<H> )-----------( 133      ( 2021 18:02 )             33.2<L>        136  |  105  |  13  ----------------------------<  66<L>  4.6   |  22  |  0.6<L>      138  |  106  |  15  ----------------------------<  144<H>  4.8   |  22  |  0.7    Ca    8.5      2021 02:35  Mg     2.4     06-25    TPro  5.1<L> [6.0 - 8.0]  /  Alb  3.7 [3.5 - 5.2]  /  TBili  1.7<H> [0.2 - 1.2]  /  DBili  x   /  AST  36 [0 - 41]  /  ALT  12 [0 - 41]  /  AlkPhos  51 [30 - 115]  06-25    PT/INR - ( 2021 02:35 )   PT: ;   INR: 1.30 ratio         PT/INR - ( 2021 18:02 )   PT: ;   INR: 1.27 ratio         PTT - ( 2021 02:35 )  PTT:30.5 sec, PTT - ( 2021 18:02 )  PTT:36.6 sec  Urinalysis Basic - ( 2021 16:17 )    Color: Light Yellow / Appearance: Clear / S.032 / pH: x  Gluc: x / Ketone: Small  / Bili: Negative / Urobili: <2 mg/dL   Blood: x / Protein: Trace / Nitrite: Negative   Leuk Esterase: Small / RBC: 2 /HPF / WBC 10 /HPF   Sq Epi: x / Non Sq Epi: 1 /HPF / Bacteria: Negative      ABG - ( 2021 03:45 )  pH: 7.37  /  pCO2: 34    /  pO2: 105   / HCO3: 20    / Base Excess: -5.0  /  SaO2: 99    /  LA: 0.6              RADIOLOGY & ADDITIONAL TESTS:  CXR: < from: Xray Chest 1 View- PORTABLE-Routine (21 @ 06:22) >  Impression:  Interval extubation and removal of enteric tube. Stable additional support devices.  No radiographic evidence of acute cardiopulmonary disease.    < end of copied text >    EKG: < from: 12 Lead ECG (21 @ 07:43) >  Ventricular Rate 100 BPM    Atrial Rate 100 BPM    P-R Interval 130 ms    QRS Duration 92 ms    Q-T Interval 358 ms    QTC Calculation(Bazett) 461 ms    P Axis 41 degrees    R Axis 2 degrees    T Axis 46 degrees    Diagnosis Line Normal sinus rhythm  Inferior infarct , age undetermined  Abnormal ECG    < end of copied text >    MEDICATIONS  (STANDING):  acetaminophen  IVPB .. 1000 milliGRAM(s) IV Intermittent once  acetaminophen  IVPB .. 1000 milliGRAM(s) IV Intermittent once  acetaminophen  IVPB .. 1000 milliGRAM(s) IV Intermittent once  aMIOdarone Infusion 1 mG/Min (33.3 mL/Hr) IV Continuous <Continuous>  aspirin enteric coated 325 milliGRAM(s) Oral daily  bisacodyl 5 milliGRAM(s) Oral every 12 hours  ceFAZolin   IVPB 1000 milliGRAM(s) IV Intermittent every 8 hours  chlorhexidine 0.12% Liquid 5 milliLiter(s) Oral Mucosa two times a day  chlorhexidine 0.12% Liquid 15 milliLiter(s) Oral Mucosa every 12 hours  chlorhexidine 4% Liquid 1 Application(s) Topical <User Schedule>  dexMEDEtomidine Infusion 0.25 MICROgram(s)/kG/Hr (6.01 mL/Hr) IV Continuous <Continuous>  dextrose 50% Injectable 50 milliLiter(s) IV Push every 15 minutes  dextrose 50% Injectable 25 milliLiter(s) IV Push every 15 minutes  DOBUTamine Infusion 0.5 MICROgram(s)/kG/Min (1.44 mL/Hr) IV Continuous <Continuous>  famotidine Injectable 20 milliGRAM(s) IV Push every 12 hours  insulin regular Infusion 1 Unit(s)/Hr (1 mL/Hr) IV Continuous <Continuous>  meperidine     Injectable 25 milliGRAM(s) IV Push once  multiple electrolytes Injection Type 1 500 milliLiter(s) (500 mL/Hr) IV Continuous <Continuous>  niCARdipine Infusion 5 mG/Hr (25 mL/Hr) IV Continuous <Continuous>  nitroglycerin  Infusion 5 MICROgram(s)/Min (1.5 mL/Hr) IV Continuous <Continuous>  norepinephrine Infusion 0.05 MICROgram(s)/kG/Min (9.01 mL/Hr) IV Continuous <Continuous>  polyethylene glycol 3350 17 Gram(s) Oral daily  propofol Infusion 15 MICROgram(s)/kG/Min (8.65 mL/Hr) IV Continuous <Continuous>  sodium chloride 0.9%. 1000 milliLiter(s) (20 mL/Hr) IV Continuous <Continuous>  vancomycin  IVPB 1000 milliGRAM(s) IV Intermittent every 12 hours    MEDICATIONS  (PRN):  ondansetron  IVPB 4 milliGRAM(s) IV Intermittent once PRN Nausea and/or Vomiting  oxyCODONE    IR 10 milliGRAM(s) Oral every 4 hours PRN Severe Pain (7 - 10)  oxyCODONE    IR 5 milliGRAM(s) Oral every 4 hours PRN Moderate Pain (4 - 6)    HEPARIN:  [x] YES [] NO  Dose: 3000 UNITS Q12H    SCD's: YES b/l  GI Prophylaxis: Protonix [], Pepcid [x], None [], (Contra-indication:.....)    Post-Op Beta-Blockers: Yes [], No[x], If No, then contraindication: hypotensive on pressors  Post-Op Aspirin: Yes [x],  No [], If No, then contraindication:  Post-Op Statin: Yes [], No[], If No, then contraindication:  Allergies    No Known Allergies    Intolerances      Ambulation/Activity Status: ambulate     Assessment/Plan:  60y Male status-post CABGx4 + radial POD#1  - Case and plan discussed with CTU Intensivist and CT Surgeon - Dr. Wilkins/Jesi/Guy  - Continue CTU supportive care    - Continue DVT/GI prophylaxis  - Incentive Spirometry 10 times an hour  - Continue to advance physical activity as tolerated and continue PT/OT as directed  1. CAD: Continue ASA, statin, no bb or nitrate due to hypotension on pressors, wean dobutamine, pain control as needed  2. HTN: currently hypotensive on pressors, will continue to monitor   3. A. Fib: amio gtt  4. COPD/Hypoxia: cont nebs, wean o2 as tolerated, encourage incentive spirometry   5. DM/Glucose Control: insulin gtt     Social Service Disposition:  pt to assess

## 2021-06-25 NOTE — PHYSICAL THERAPY INITIAL EVALUATION ADULT - GAIT TRAINING, PT EVAL
By discharge, pt will be able to ambulate 150ft with rolling walker and supervision assistance. By discharge, pt will be able to negotiate 12 steps with 1 HR and contact guard assistance.

## 2021-06-25 NOTE — PROGRESS NOTE ADULT - ASSESSMENT
NSTEMI/CAD-s/p CABG x 4-POD #1  0-Spbaouclcpx-tcuxzocphoa support as needed to maintain SBP >100 mmHg  2-serum glucose control-insulin infusion  3-BPH-continue Flomax  4-acute blood loss anemia-stable, monitor Hb/Hct daily

## 2021-06-26 LAB
ALBUMIN SERPL ELPH-MCNC: 3.7 G/DL — SIGNIFICANT CHANGE UP (ref 3.5–5.2)
ALBUMIN SERPL ELPH-MCNC: 3.8 G/DL — SIGNIFICANT CHANGE UP (ref 3.5–5.2)
ALP SERPL-CCNC: 49 U/L — SIGNIFICANT CHANGE UP (ref 30–115)
ALP SERPL-CCNC: 51 U/L — SIGNIFICANT CHANGE UP (ref 30–115)
ALT FLD-CCNC: 15 U/L — SIGNIFICANT CHANGE UP (ref 0–41)
ALT FLD-CCNC: 15 U/L — SIGNIFICANT CHANGE UP (ref 0–41)
ANION GAP SERPL CALC-SCNC: 11 MMOL/L — SIGNIFICANT CHANGE UP (ref 7–14)
ANION GAP SERPL CALC-SCNC: 8 MMOL/L — SIGNIFICANT CHANGE UP (ref 7–14)
ANION GAP SERPL CALC-SCNC: 9 MMOL/L — SIGNIFICANT CHANGE UP (ref 7–14)
AST SERPL-CCNC: 24 U/L — SIGNIFICANT CHANGE UP (ref 0–41)
AST SERPL-CCNC: 25 U/L — SIGNIFICANT CHANGE UP (ref 0–41)
BASOPHILS # BLD AUTO: 0.01 K/UL — SIGNIFICANT CHANGE UP (ref 0–0.2)
BASOPHILS # BLD AUTO: 0.02 K/UL — SIGNIFICANT CHANGE UP (ref 0–0.2)
BASOPHILS NFR BLD AUTO: 0.1 % — SIGNIFICANT CHANGE UP (ref 0–1)
BASOPHILS NFR BLD AUTO: 0.1 % — SIGNIFICANT CHANGE UP (ref 0–1)
BILIRUB SERPL-MCNC: 1.2 MG/DL — SIGNIFICANT CHANGE UP (ref 0.2–1.2)
BILIRUB SERPL-MCNC: 1.2 MG/DL — SIGNIFICANT CHANGE UP (ref 0.2–1.2)
BUN SERPL-MCNC: 10 MG/DL — SIGNIFICANT CHANGE UP (ref 10–20)
BUN SERPL-MCNC: 11 MG/DL — SIGNIFICANT CHANGE UP (ref 10–20)
BUN SERPL-MCNC: 9 MG/DL — LOW (ref 10–20)
CALCIUM SERPL-MCNC: 8.2 MG/DL — LOW (ref 8.5–10.1)
CALCIUM SERPL-MCNC: 8.5 MG/DL — SIGNIFICANT CHANGE UP (ref 8.5–10.1)
CALCIUM SERPL-MCNC: 8.6 MG/DL — SIGNIFICANT CHANGE UP (ref 8.5–10.1)
CHLORIDE SERPL-SCNC: 101 MMOL/L — SIGNIFICANT CHANGE UP (ref 98–110)
CHLORIDE SERPL-SCNC: 105 MMOL/L — SIGNIFICANT CHANGE UP (ref 98–110)
CHLORIDE SERPL-SCNC: 105 MMOL/L — SIGNIFICANT CHANGE UP (ref 98–110)
CO2 SERPL-SCNC: 21 MMOL/L — SIGNIFICANT CHANGE UP (ref 17–32)
CO2 SERPL-SCNC: 22 MMOL/L — SIGNIFICANT CHANGE UP (ref 17–32)
CO2 SERPL-SCNC: 23 MMOL/L — SIGNIFICANT CHANGE UP (ref 17–32)
CREAT SERPL-MCNC: 0.7 MG/DL — SIGNIFICANT CHANGE UP (ref 0.7–1.5)
CREAT SERPL-MCNC: 0.7 MG/DL — SIGNIFICANT CHANGE UP (ref 0.7–1.5)
CREAT SERPL-MCNC: 0.8 MG/DL — SIGNIFICANT CHANGE UP (ref 0.7–1.5)
EOSINOPHIL # BLD AUTO: 0.01 K/UL — SIGNIFICANT CHANGE UP (ref 0–0.7)
EOSINOPHIL # BLD AUTO: 0.01 K/UL — SIGNIFICANT CHANGE UP (ref 0–0.7)
EOSINOPHIL NFR BLD AUTO: 0.1 % — SIGNIFICANT CHANGE UP (ref 0–8)
EOSINOPHIL NFR BLD AUTO: 0.1 % — SIGNIFICANT CHANGE UP (ref 0–8)
GLUCOSE BLDC GLUCOMTR-MCNC: 118 MG/DL — HIGH (ref 70–99)
GLUCOSE BLDC GLUCOMTR-MCNC: 131 MG/DL — HIGH (ref 70–99)
GLUCOSE BLDC GLUCOMTR-MCNC: 151 MG/DL — HIGH (ref 70–99)
GLUCOSE BLDC GLUCOMTR-MCNC: 159 MG/DL — HIGH (ref 70–99)
GLUCOSE BLDC GLUCOMTR-MCNC: 168 MG/DL — HIGH (ref 70–99)
GLUCOSE BLDC GLUCOMTR-MCNC: 199 MG/DL — HIGH (ref 70–99)
GLUCOSE BLDC GLUCOMTR-MCNC: 218 MG/DL — HIGH (ref 70–99)
GLUCOSE BLDC GLUCOMTR-MCNC: 226 MG/DL — HIGH (ref 70–99)
GLUCOSE BLDC GLUCOMTR-MCNC: 239 MG/DL — HIGH (ref 70–99)
GLUCOSE BLDC GLUCOMTR-MCNC: 54 MG/DL — CRITICAL LOW (ref 70–99)
GLUCOSE BLDC GLUCOMTR-MCNC: 83 MG/DL — SIGNIFICANT CHANGE UP (ref 70–99)
GLUCOSE BLDC GLUCOMTR-MCNC: 95 MG/DL — SIGNIFICANT CHANGE UP (ref 70–99)
GLUCOSE BLDC GLUCOMTR-MCNC: 98 MG/DL — SIGNIFICANT CHANGE UP (ref 70–99)
GLUCOSE SERPL-MCNC: 125 MG/DL — HIGH (ref 70–99)
GLUCOSE SERPL-MCNC: 217 MG/DL — HIGH (ref 70–99)
GLUCOSE SERPL-MCNC: 51 MG/DL — CRITICAL LOW (ref 70–99)
HCT VFR BLD CALC: 28.3 % — LOW (ref 42–52)
HCT VFR BLD CALC: 29 % — LOW (ref 42–52)
HGB BLD-MCNC: 10.1 G/DL — LOW (ref 14–18)
HGB BLD-MCNC: 9.7 G/DL — LOW (ref 14–18)
IMM GRANULOCYTES NFR BLD AUTO: 0.5 % — HIGH (ref 0.1–0.3)
IMM GRANULOCYTES NFR BLD AUTO: 0.6 % — HIGH (ref 0.1–0.3)
LYMPHOCYTES # BLD AUTO: 1.35 K/UL — SIGNIFICANT CHANGE UP (ref 1.2–3.4)
LYMPHOCYTES # BLD AUTO: 1.55 K/UL — SIGNIFICANT CHANGE UP (ref 1.2–3.4)
LYMPHOCYTES # BLD AUTO: 12.6 % — LOW (ref 20.5–51.1)
LYMPHOCYTES # BLD AUTO: 8.3 % — LOW (ref 20.5–51.1)
MAGNESIUM SERPL-MCNC: 2.2 MG/DL — SIGNIFICANT CHANGE UP (ref 1.8–2.4)
MAGNESIUM SERPL-MCNC: 2.2 MG/DL — SIGNIFICANT CHANGE UP (ref 1.8–2.4)
MCHC RBC-ENTMCNC: 29.3 PG — SIGNIFICANT CHANGE UP (ref 27–31)
MCHC RBC-ENTMCNC: 29.9 PG — SIGNIFICANT CHANGE UP (ref 27–31)
MCHC RBC-ENTMCNC: 34.3 G/DL — SIGNIFICANT CHANGE UP (ref 32–37)
MCHC RBC-ENTMCNC: 34.8 G/DL — SIGNIFICANT CHANGE UP (ref 32–37)
MCV RBC AUTO: 85.5 FL — SIGNIFICANT CHANGE UP (ref 80–94)
MCV RBC AUTO: 85.8 FL — SIGNIFICANT CHANGE UP (ref 80–94)
MONOCYTES # BLD AUTO: 1.09 K/UL — HIGH (ref 0.1–0.6)
MONOCYTES # BLD AUTO: 2.06 K/UL — HIGH (ref 0.1–0.6)
MONOCYTES NFR BLD AUTO: 12.6 % — HIGH (ref 1.7–9.3)
MONOCYTES NFR BLD AUTO: 8.9 % — SIGNIFICANT CHANGE UP (ref 1.7–9.3)
NEUTROPHILS # BLD AUTO: 12.83 K/UL — HIGH (ref 1.4–6.5)
NEUTROPHILS # BLD AUTO: 9.56 K/UL — HIGH (ref 1.4–6.5)
NEUTROPHILS NFR BLD AUTO: 77.8 % — HIGH (ref 42.2–75.2)
NEUTROPHILS NFR BLD AUTO: 78.3 % — HIGH (ref 42.2–75.2)
NRBC # BLD: 0 /100 WBCS — SIGNIFICANT CHANGE UP (ref 0–0)
NRBC # BLD: 0 /100 WBCS — SIGNIFICANT CHANGE UP (ref 0–0)
PLATELET # BLD AUTO: 139 K/UL — SIGNIFICANT CHANGE UP (ref 130–400)
PLATELET # BLD AUTO: 173 K/UL — SIGNIFICANT CHANGE UP (ref 130–400)
POTASSIUM SERPL-MCNC: 3.5 MMOL/L — SIGNIFICANT CHANGE UP (ref 3.5–5)
POTASSIUM SERPL-MCNC: 3.7 MMOL/L — SIGNIFICANT CHANGE UP (ref 3.5–5)
POTASSIUM SERPL-MCNC: 4.2 MMOL/L — SIGNIFICANT CHANGE UP (ref 3.5–5)
POTASSIUM SERPL-SCNC: 3.5 MMOL/L — SIGNIFICANT CHANGE UP (ref 3.5–5)
POTASSIUM SERPL-SCNC: 3.7 MMOL/L — SIGNIFICANT CHANGE UP (ref 3.5–5)
POTASSIUM SERPL-SCNC: 4.2 MMOL/L — SIGNIFICANT CHANGE UP (ref 3.5–5)
PROT SERPL-MCNC: 5.1 G/DL — LOW (ref 6–8)
PROT SERPL-MCNC: 5.3 G/DL — LOW (ref 6–8)
RBC # BLD: 3.31 M/UL — LOW (ref 4.7–6.1)
RBC # BLD: 3.38 M/UL — LOW (ref 4.7–6.1)
RBC # FLD: 13.1 % — SIGNIFICANT CHANGE UP (ref 11.5–14.5)
RBC # FLD: 13.1 % — SIGNIFICANT CHANGE UP (ref 11.5–14.5)
SODIUM SERPL-SCNC: 133 MMOL/L — LOW (ref 135–146)
SODIUM SERPL-SCNC: 135 MMOL/L — SIGNIFICANT CHANGE UP (ref 135–146)
SODIUM SERPL-SCNC: 137 MMOL/L — SIGNIFICANT CHANGE UP (ref 135–146)
WBC # BLD: 12.28 K/UL — HIGH (ref 4.8–10.8)
WBC # BLD: 16.36 K/UL — HIGH (ref 4.8–10.8)
WBC # FLD AUTO: 12.28 K/UL — HIGH (ref 4.8–10.8)
WBC # FLD AUTO: 16.36 K/UL — HIGH (ref 4.8–10.8)

## 2021-06-26 PROCEDURE — 93010 ELECTROCARDIOGRAM REPORT: CPT

## 2021-06-26 PROCEDURE — 71045 X-RAY EXAM CHEST 1 VIEW: CPT | Mod: 26,77

## 2021-06-26 PROCEDURE — 71045 X-RAY EXAM CHEST 1 VIEW: CPT | Mod: 26

## 2021-06-26 PROCEDURE — 99233 SBSQ HOSP IP/OBS HIGH 50: CPT

## 2021-06-26 RX ORDER — CEFAZOLIN SODIUM 1 G
1000 VIAL (EA) INJECTION EVERY 8 HOURS
Refills: 0 | Status: COMPLETED | OUTPATIENT
Start: 2021-06-26 | End: 2021-06-27

## 2021-06-26 RX ORDER — INSULIN GLARGINE 100 [IU]/ML
20 INJECTION, SOLUTION SUBCUTANEOUS EVERY MORNING
Refills: 0 | Status: DISCONTINUED | OUTPATIENT
Start: 2021-06-26 | End: 2021-06-29

## 2021-06-26 RX ORDER — FUROSEMIDE 40 MG
20 TABLET ORAL ONCE
Refills: 0 | Status: COMPLETED | OUTPATIENT
Start: 2021-06-26 | End: 2021-06-26

## 2021-06-26 RX ORDER — METFORMIN HYDROCHLORIDE 850 MG/1
1000 TABLET ORAL
Refills: 0 | Status: DISCONTINUED | OUTPATIENT
Start: 2021-06-26 | End: 2021-06-29

## 2021-06-26 RX ORDER — AMLODIPINE BESYLATE 2.5 MG/1
2.5 TABLET ORAL DAILY
Refills: 0 | Status: DISCONTINUED | OUTPATIENT
Start: 2021-06-26 | End: 2021-06-26

## 2021-06-26 RX ORDER — GLUCAGON INJECTION, SOLUTION 0.5 MG/.1ML
1 INJECTION, SOLUTION SUBCUTANEOUS ONCE
Refills: 0 | Status: DISCONTINUED | OUTPATIENT
Start: 2021-06-26 | End: 2021-06-29

## 2021-06-26 RX ORDER — POTASSIUM CHLORIDE 20 MEQ
20 PACKET (EA) ORAL ONCE
Refills: 0 | Status: COMPLETED | OUTPATIENT
Start: 2021-06-26 | End: 2021-06-26

## 2021-06-26 RX ORDER — INSULIN LISPRO 100/ML
VIAL (ML) SUBCUTANEOUS
Refills: 0 | Status: DISCONTINUED | OUTPATIENT
Start: 2021-06-26 | End: 2021-06-29

## 2021-06-26 RX ORDER — DEXTROSE 50 % IN WATER 50 %
15 SYRINGE (ML) INTRAVENOUS ONCE
Refills: 0 | Status: DISCONTINUED | OUTPATIENT
Start: 2021-06-26 | End: 2021-06-29

## 2021-06-26 RX ORDER — METOPROLOL TARTRATE 50 MG
12.5 TABLET ORAL EVERY 12 HOURS
Refills: 0 | Status: DISCONTINUED | OUTPATIENT
Start: 2021-06-26 | End: 2021-06-29

## 2021-06-26 RX ORDER — SODIUM CHLORIDE 9 MG/ML
1000 INJECTION, SOLUTION INTRAVENOUS
Refills: 0 | Status: DISCONTINUED | OUTPATIENT
Start: 2021-06-26 | End: 2021-06-29

## 2021-06-26 RX ORDER — FUROSEMIDE 40 MG
10 TABLET ORAL ONCE
Refills: 0 | Status: DISCONTINUED | OUTPATIENT
Start: 2021-06-26 | End: 2021-06-26

## 2021-06-26 RX ADMIN — Medication 100 MILLIGRAM(S): at 16:56

## 2021-06-26 RX ADMIN — FAMOTIDINE 20 MILLIGRAM(S): 10 INJECTION INTRAVENOUS at 05:46

## 2021-06-26 RX ADMIN — CHLORHEXIDINE GLUCONATE 1 APPLICATION(S): 213 SOLUTION TOPICAL at 05:53

## 2021-06-26 RX ADMIN — OXYCODONE HYDROCHLORIDE 10 MILLIGRAM(S): 5 TABLET ORAL at 05:30

## 2021-06-26 RX ADMIN — Medication 400 MILLIGRAM(S): at 17:25

## 2021-06-26 RX ADMIN — FAMOTIDINE 20 MILLIGRAM(S): 10 INJECTION INTRAVENOUS at 17:23

## 2021-06-26 RX ADMIN — Medication 325 MILLIGRAM(S): at 11:34

## 2021-06-26 RX ADMIN — Medication 1000 MILLIGRAM(S): at 06:15

## 2021-06-26 RX ADMIN — Medication 100 MILLIEQUIVALENT(S): at 06:06

## 2021-06-26 RX ADMIN — Medication 400 MILLIGRAM(S): at 23:58

## 2021-06-26 RX ADMIN — OXYCODONE HYDROCHLORIDE 10 MILLIGRAM(S): 5 TABLET ORAL at 16:43

## 2021-06-26 RX ADMIN — ATORVASTATIN CALCIUM 20 MILLIGRAM(S): 80 TABLET, FILM COATED ORAL at 21:09

## 2021-06-26 RX ADMIN — Medication 12.5 MILLIGRAM(S): at 17:01

## 2021-06-26 RX ADMIN — Medication 5 MILLIGRAM(S): at 05:46

## 2021-06-26 RX ADMIN — Medication 250 MILLIGRAM(S): at 05:45

## 2021-06-26 RX ADMIN — OXYCODONE HYDROCHLORIDE 10 MILLIGRAM(S): 5 TABLET ORAL at 16:57

## 2021-06-26 RX ADMIN — POLYETHYLENE GLYCOL 3350 17 GRAM(S): 17 POWDER, FOR SOLUTION ORAL at 11:31

## 2021-06-26 RX ADMIN — HEPARIN SODIUM 3000 UNIT(S): 5000 INJECTION INTRAVENOUS; SUBCUTANEOUS at 05:44

## 2021-06-26 RX ADMIN — Medication 1000 MILLIGRAM(S): at 12:34

## 2021-06-26 RX ADMIN — Medication 5 MILLIGRAM(S): at 17:25

## 2021-06-26 RX ADMIN — Medication 500 MICROGRAM(S): at 08:58

## 2021-06-26 RX ADMIN — HEPARIN SODIUM 3000 UNIT(S): 5000 INJECTION INTRAVENOUS; SUBCUTANEOUS at 17:23

## 2021-06-26 RX ADMIN — SENNA PLUS 2 TABLET(S): 8.6 TABLET ORAL at 21:09

## 2021-06-26 RX ADMIN — Medication 100 MILLIGRAM(S): at 21:09

## 2021-06-26 RX ADMIN — Medication 100 MILLIGRAM(S): at 05:44

## 2021-06-26 RX ADMIN — Medication 20 MILLIGRAM(S): at 10:26

## 2021-06-26 RX ADMIN — Medication 100 MILLIEQUIVALENT(S): at 06:45

## 2021-06-26 RX ADMIN — OXYCODONE HYDROCHLORIDE 10 MILLIGRAM(S): 5 TABLET ORAL at 06:00

## 2021-06-26 RX ADMIN — INSULIN GLARGINE 20 UNIT(S): 100 INJECTION, SOLUTION SUBCUTANEOUS at 14:19

## 2021-06-26 RX ADMIN — Medication 400 MILLIGRAM(S): at 05:47

## 2021-06-26 RX ADMIN — Medication 1000 MILLIGRAM(S): at 18:24

## 2021-06-26 RX ADMIN — Medication 20 MILLIEQUIVALENT(S): at 10:26

## 2021-06-26 RX ADMIN — OXYCODONE HYDROCHLORIDE 10 MILLIGRAM(S): 5 TABLET ORAL at 11:31

## 2021-06-26 RX ADMIN — METFORMIN HYDROCHLORIDE 1000 MILLIGRAM(S): 850 TABLET ORAL at 18:24

## 2021-06-26 RX ADMIN — OXYCODONE HYDROCHLORIDE 10 MILLIGRAM(S): 5 TABLET ORAL at 12:35

## 2021-06-26 RX ADMIN — Medication 400 MILLIGRAM(S): at 12:34

## 2021-06-26 RX ADMIN — Medication 100 MILLIEQUIVALENT(S): at 08:20

## 2021-06-26 RX ADMIN — Medication 12.5 MILLIGRAM(S): at 11:31

## 2021-06-26 RX ADMIN — Medication 500 MICROGRAM(S): at 13:41

## 2021-06-26 NOTE — PROGRESS NOTE ADULT - SUBJECTIVE AND OBJECTIVE BOX
CTU Attending Progress Daily Note     26 Jun 2021 09:29    Procedure:                                                  POD#                   Patient seen as post-op critical care follow-up    HPI:   60y M w/ hx of DM, HLD, BPH presenting with retrosternal chest pain that started last evening after dinner.. No radiation. 4/10 in severity, non exertional . Took a baby aspirin at home. Patient had episode of chest pain last week that resolved spontaneously, no previous chest pain or hx of cardiac disease Endorses mild SOB on exertion. Denies headache, N/V, abdominal pain, diarrhea, dysuria.    Was admitted for Obs,where he underwent CCTA that showed 3V disease, EKG with no ischemic changes, troponin <0.01--> 0.02, admitted for Cath in the AM, Loaded with aspirin in the ED  (21 Jun 2021 00:01)    See preop testing chart H&P    Interval event for past 24 hr:  CHRISTI CRABTREE  60y had no event.     Current Complains:  CHRISTI CRABTREE has no new complaints    REVIEW OF SYSTEMS:  CONSTITUTIONAL:  [-] weakness, [-] fevers, [-] chills  EYES/ENT: [-] visual changes, [-] vertigo, [-] throat pain   NECK: [-] pain, [-] stiffness  RESPIRATORY: [-] cough, [-] wheezing, [-] hemoptysis, [-] shortness of breath  CARDIOVASCULAR: [-] chest pain, [-] palpitations, [-] orthopnea  GASTROINTESTINAL:    [-]abdominal pain, [-] nausea, [-] vomiting, [-] hematemesis, [-] diarrhea, [-] constipation, [-] melena, [-] hematochezia.  GENITOURINARY: [-] dysuria, [-] frequency, [-] hematuria  NEUROLOGICAL: [-] numbness, [-] weakness  SKIN: [-] itching, [-] burning, [-] rashes, [-] lesions   All other review of systems is negative unless indicated above.    [  ] Unable to assess ROS because :    OBJECTIVE:  ICU Vital Signs Last 24 Hrs  T(C): 37.1 (26 Jun 2021 08:00), Max: 37.2 (25 Jun 2021 15:00)  T(F): 98.8 (26 Jun 2021 08:00), Max: 99 (26 Jun 2021 04:00)  HR: 92 (26 Jun 2021 09:00) (80 - 104)  BP: 92/59 (26 Jun 2021 08:00) (92/59 - 116/57)  BP(mean): 71 (26 Jun 2021 08:00) (68 - 79)  ABP: 116/55 (26 Jun 2021 09:00) (81/41 - 143/62)  ABP(mean): 73 (26 Jun 2021 09:00) (50 - 86)  RR: 24 (26 Jun 2021 09:00) (18 - 50)  SpO2: 98% (26 Jun 2021 09:00) (94% - 100%)      I&O's Summary    25 Jun 2021 07:01  -  26 Jun 2021 07:00  --------------------------------------------------------  IN: 3213.4 mL / OUT: 2370 mL / NET: 843.4 mL    26 Jun 2021 07:01  -  26 Jun 2021 09:29  --------------------------------------------------------  IN: 420.4 mL / OUT: 220 mL / NET: 200.4 mL      Adult Advanced Hemodynamics Last 24 Hrs  CVP(mm Hg): --  CVP(cm H2O): --  CO: 8.8 (25 Jun 2021 10:00) (8.8 - 8.8)  CI: 3.9 (25 Jun 2021 10:00) (3.9 - 3.9)  PA: --  PA(mean): --  PCWP: --  SVR: --  SVRI: --  PVR: --  PVRI: --      PHYSICAL EXAM:  General: WN/WD NAD    HEENT:     [+] NCAT  [+] EOMI  [-] Conjuctival edema   [-] Icterus   [-] Thrush   [-] ETT  [-] NGT/OGT    Neck:         [+] FROM   [-] JVD     [-] Nodes     [-] Masses    [+] Mid-line trachea    [-] Tracheostomy    Chest:         [-] Sternal click   [-] Sternal drainage   [+] Pacing wires   [+] Chest tubes   [-] SubQ emphysema    Lungs:          [+] CTA   [-] Rhonchi   [-] Rales    [-] Wheezing    [-] Decreased BS    [-] Dullness R L    Cardiac:       [+] S1 [+] S2    [+] RRR   [-] Irregular   [-] S3   [-] S4    [-] Murmurs    [-] Rub    Abdomen:    [+] BS    [+] Soft    [+] Non-tender     [-] Distended    [-] Organomegaly  [-] PEG    Extremities:   [-] Cyanosis U/L   [-] Clubbing  U/L  [-] LE/UE Edema   [+] Capillary refill    [+] Pulses     Neuro:        [+] Awake   [+]  Alert   [-] Confused   [-] Lethargic   [-] Sedated   [-] Generalized Weakness    Skin:        [-] Rashes    [-] Erythema   [+] Normal incisions   [+] IV sites intact   [-] Sacral decubitus    Tubes:  LINES:    CAPILLARY BLOOD GLUCOSE      POCT Blood Glucose.: 199 mg/dL (26 Jun 2021 09:20)    CAPILLARY BLOOD GLUCOSE      POCT Blood Glucose.: 199 mg/dL (26 Jun 2021 09:20)  POCT Blood Glucose.: 218 mg/dL (26 Jun 2021 07:56)  POCT Blood Glucose.: 168 mg/dL (26 Jun 2021 06:35)  POCT Blood Glucose.: 83 mg/dL (26 Jun 2021 05:15)  POCT Blood Glucose.: 54 mg/dL (26 Jun 2021 04:14)  POCT Blood Glucose.: 98 mg/dL (26 Jun 2021 01:57)  POCT Blood Glucose.: 151 mg/dL (26 Jun 2021 00:57)  POCT Blood Glucose.: 159 mg/dL (26 Jun 2021 00:06)  POCT Blood Glucose.: 200 mg/dL (25 Jun 2021 22:42)  POCT Blood Glucose.: 151 mg/dL (25 Jun 2021 20:17)  POCT Blood Glucose.: 178 mg/dL (25 Jun 2021 18:32)  POCT Blood Glucose.: 152 mg/dL (25 Jun 2021 17:23)  POCT Blood Glucose.: 122 mg/dL (25 Jun 2021 14:11)  POCT Blood Glucose.: 142 mg/dL (25 Jun 2021 12:40)  POCT Blood Glucose.: 176 mg/dL (25 Jun 2021 11:22)      HOSPITAL MEDICATIONS:  MEDICATIONS  (STANDING):  acetaminophen  IVPB .. 1000 milliGRAM(s) IV Intermittent once  acetaminophen  IVPB .. 1000 milliGRAM(s) IV Intermittent once  acetaminophen  IVPB .. 1000 milliGRAM(s) IV Intermittent once  aMIOdarone Infusion 1 mG/Min (33.3 mL/Hr) IV Continuous <Continuous>  aspirin enteric coated 325 milliGRAM(s) Oral daily  atorvastatin 20 milliGRAM(s) Oral at bedtime  bisacodyl 5 milliGRAM(s) Oral every 12 hours  ceFAZolin   IVPB 1000 milliGRAM(s) IV Intermittent every 8 hours  chlorhexidine 4% Liquid 1 Application(s) Topical <User Schedule>  dextrose 50% Injectable 50 milliLiter(s) IV Push every 15 minutes  dextrose 50% Injectable 25 milliLiter(s) IV Push every 15 minutes  DOBUTamine Infusion 0.5 MICROgram(s)/kG/Min (1.44 mL/Hr) IV Continuous <Continuous>  famotidine    Tablet 20 milliGRAM(s) Oral two times a day  heparin   Injectable 3000 Unit(s) SubCutaneous every 12 hours  insulin regular Infusion 1 Unit(s)/Hr (1 mL/Hr) IV Continuous <Continuous>  ipratropium    for Nebulization 500 MICROGram(s) Nebulizer every 6 hours  meperidine     Injectable 25 milliGRAM(s) IV Push once  multiple electrolytes Injection Type 1 500 milliLiter(s) (500 mL/Hr) IV Continuous <Continuous>  nitroglycerin  Infusion 5 MICROgram(s)/Min (1.5 mL/Hr) IV Continuous <Continuous>  polyethylene glycol 3350 17 Gram(s) Oral daily  senna 2 Tablet(s) Oral at bedtime  sodium chloride 0.9%. 1000 milliLiter(s) (20 mL/Hr) IV Continuous <Continuous>    MEDICATIONS  (PRN):  ondansetron  IVPB 4 milliGRAM(s) IV Intermittent once PRN Nausea and/or Vomiting  oxyCODONE    IR 10 milliGRAM(s) Oral every 4 hours PRN Severe Pain (7 - 10)  oxyCODONE    IR 5 milliGRAM(s) Oral every 4 hours PRN Moderate Pain (4 - 6)      LABS:  ABG - ( 26 Jun 2021 03:52 )  pH, Arterial: 7.45  pH, Blood: x     /  pCO2: 36    /  pO2: 74    / HCO3: 25    / Base Excess: 1.2   /  SaO2: 97                                      10.1   16.36 )-----------( 173      ( 26 Jun 2021 04:30 )             29.0     06-26    137  |  105  |  9<L>  ----------------------------<  51<LL>  3.5   |  23  |  0.7    Ca    8.5      26 Jun 2021 04:30  Mg     2.2     06-26    TPro  5.3<L>  /  Alb  3.8  /  TBili  1.2  /  DBili  x   /  AST  25  /  ALT  15  /  AlkPhos  51  06-26    PT/INR - ( 25 Jun 2021 02:35 )   PT: 15.00 sec;   INR: 1.30 ratio         PTT - ( 25 Jun 2021 02:35 )  PTT:30.5 sec        RADIOLOGY:  Reviewed and interpreted by me  CXR from 06-26-21 shows [+] mild congestion, [-] pneumothorax, [-] R/L effusion, [-] cardiomegaly,   NGT in place, S-G Catheter in place, R/L TLC in place, R/L Chest Tubes in place    ECG:  Reviewed and interpreted by me:   QTC:    Assessment:      PAST MEDICAL & SURGICAL HISTORY:      PLAN:  Neuro: Pain control  Pulm: Encourage coughing, deep breathing and use of incentive spirometry. Wean off supplemental oxygen as able. Daily CXR.   Cardio: Monitor telemetry/alarms. Continue cardiac meds  GI: Tolerating diet. Continue stool softeners. Continue GI prophylaxis  Renal: monitor urine output, supplement electrolytes as needed  Vasc: Heparin SC/SCDs for DVT prophylaxis  Heme: Monitor H/H.   ID: Off antibiotics. Stable.  Endocrine: Monitor finger stick blood sugar and control hyperglycemia with insulin  Physical Therapy: OOB/ambulate  Tubes: Monitor Chest tube output      Discussed with Cardiothoracic Team at AM rounds.    45 minutes of critical care time spent providing medical care for patient's acute illness/conditions that impairs at least one vital organ system and/or poses a high risk of imminent or life threatening deterioration in the patient's condition. It includes time spent evaluating and treating the patient's acute illness as well as time spent reviewing labs, radiology, discussing goals of care with patient and/or patient's family, and discussing the case with a multidisciplinary team in an effort to prevent further life threatening deterioration or end organ damage. This time is independent of any procedures performed. CTU Attending Progress Daily Note     26 Jun 2021 09:29    Procedure:              CABG                                    POD#             2      Patient seen as post-op critical care follow-up    HPI:   60y M w/ hx of DM, HLD, BPH presenting with retrosternal chest pain that started last evening after dinner.. No radiation. 4/10 in severity, non exertional . Took a baby aspirin at home. Patient had episode of chest pain last week that resolved spontaneously, no previous chest pain or hx of cardiac disease Endorses mild SOB on exertion. Denies headache, N/V, abdominal pain, diarrhea, dysuria.    Was admitted for Obs,where he underwent CCTA that showed 3V disease, EKG with no ischemic changes, troponin <0.01--> 0.02, admitted for Cath in the AM, Loaded with aspirin in the ED  (21 Jun 2021 00:01)    See preop testing chart H&P    Interval event for past 24 hr:  CHRISTI CRABTREE  60y had sinus tach on amio gtt    Current Complains:  CHRISTI CRABTREE has no new complaints    REVIEW OF SYSTEMS:  CONSTITUTIONAL:  [-] weakness, [-] fevers, [-] chills  EYES/ENT: [-] visual changes, [-] vertigo, [-] throat pain   NECK: [-] pain, [-] stiffness  RESPIRATORY: [-] cough, [-] wheezing, [-] hemoptysis, [-] shortness of breath  CARDIOVASCULAR: [-] chest pain, [-] palpitations, [-] orthopnea  GASTROINTESTINAL:    [-]abdominal pain, [-] nausea, [-] vomiting, [-] hematemesis, [-] diarrhea, [-] constipation, [-] melena, [-] hematochezia.  GENITOURINARY: [-] dysuria, [-] frequency, [-] hematuria  NEUROLOGICAL: [-] numbness, [-] weakness  SKIN: [-] itching, [-] burning, [-] rashes, [-] lesions   All other review of systems is negative unless indicated above.    [  ] Unable to assess ROS because :    OBJECTIVE:  ICU Vital Signs Last 24 Hrs  T(C): 37.1 (26 Jun 2021 08:00), Max: 37.2 (25 Jun 2021 15:00)  T(F): 98.8 (26 Jun 2021 08:00), Max: 99 (26 Jun 2021 04:00)  HR: 92 (26 Jun 2021 09:00) (80 - 104)  BP: 92/59 (26 Jun 2021 08:00) (92/59 - 116/57)  BP(mean): 71 (26 Jun 2021 08:00) (68 - 79)  ABP: 116/55 (26 Jun 2021 09:00) (81/41 - 143/62)  ABP(mean): 73 (26 Jun 2021 09:00) (50 - 86)  RR: 24 (26 Jun 2021 09:00) (18 - 50)  SpO2: 98% (26 Jun 2021 09:00) (94% - 100%)      I&O's Summary    25 Jun 2021 07:01  -  26 Jun 2021 07:00  --------------------------------------------------------  IN: 3213.4 mL / OUT: 2370 mL / NET: 843.4 mL    26 Jun 2021 07:01  -  26 Jun 2021 09:29  --------------------------------------------------------  IN: 420.4 mL / OUT: 220 mL / NET: 200.4 mL      Adult Advanced Hemodynamics Last 24 Hrs  CVP(mm Hg): --  CVP(cm H2O): --  CO: 8.8 (25 Jun 2021 10:00) (8.8 - 8.8)  CI: 3.9 (25 Jun 2021 10:00) (3.9 - 3.9)  PA: --  PA(mean): --  PCWP: --  SVR: --  SVRI: --  PVR: --  PVRI: --      PHYSICAL EXAM:  General: WN/WD NAD    HEENT:     [+] NCAT  [+] EOMI  [-] Conjuctival edema   [-] Icterus   [-] Thrush   [-] ETT  [-] NGT/OGT    Neck:         [+] FROM   [-] JVD     [-] Nodes     [-] Masses    [+] Mid-line trachea    [-] Tracheostomy    Chest:         [-] Sternal click   [-] Sternal drainage   [+] Pacing wires   [+] Chest tubes   [-] SubQ emphysema    Lungs:          [+] CTA   [-] Rhonchi   [-] Rales    [-] Wheezing    [-] Decreased BS    [-] Dullness R L    Cardiac:       [+] S1 [+] S2    [+] RRR   [-] Irregular   [-] S3   [-] S4    [-] Murmurs    [-] Rub    Abdomen:    [+] BS    [+] Soft    [+] Non-tender     [-] Distended    [-] Organomegaly  [-] PEG    Extremities:   [-] Cyanosis U/L   [-] Clubbing  U/L  [-] LE/UE Edema   [+] Capillary refill    [+] Pulses     Neuro:        [+] Awake   [+]  Alert   [-] Confused   [-] Lethargic   [-] Sedated   [-] Generalized Weakness    Skin:        [-] Rashes    [-] Erythema   [+] Normal incisions   [+] IV sites intact   [-] Sacral decubitus    Tubes:  LINES:    CAPILLARY BLOOD GLUCOSE      POCT Blood Glucose.: 199 mg/dL (26 Jun 2021 09:20)    CAPILLARY BLOOD GLUCOSE      POCT Blood Glucose.: 199 mg/dL (26 Jun 2021 09:20)  POCT Blood Glucose.: 218 mg/dL (26 Jun 2021 07:56)  POCT Blood Glucose.: 168 mg/dL (26 Jun 2021 06:35)  POCT Blood Glucose.: 83 mg/dL (26 Jun 2021 05:15)  POCT Blood Glucose.: 54 mg/dL (26 Jun 2021 04:14)  POCT Blood Glucose.: 98 mg/dL (26 Jun 2021 01:57)  POCT Blood Glucose.: 151 mg/dL (26 Jun 2021 00:57)  POCT Blood Glucose.: 159 mg/dL (26 Jun 2021 00:06)  POCT Blood Glucose.: 200 mg/dL (25 Jun 2021 22:42)  POCT Blood Glucose.: 151 mg/dL (25 Jun 2021 20:17)  POCT Blood Glucose.: 178 mg/dL (25 Jun 2021 18:32)  POCT Blood Glucose.: 152 mg/dL (25 Jun 2021 17:23)  POCT Blood Glucose.: 122 mg/dL (25 Jun 2021 14:11)  POCT Blood Glucose.: 142 mg/dL (25 Jun 2021 12:40)  POCT Blood Glucose.: 176 mg/dL (25 Jun 2021 11:22)      HOSPITAL MEDICATIONS:  MEDICATIONS  (STANDING):  acetaminophen  IVPB .. 1000 milliGRAM(s) IV Intermittent once  acetaminophen  IVPB .. 1000 milliGRAM(s) IV Intermittent once  acetaminophen  IVPB .. 1000 milliGRAM(s) IV Intermittent once  aMIOdarone Infusion 1 mG/Min (33.3 mL/Hr) IV Continuous <Continuous>  aspirin enteric coated 325 milliGRAM(s) Oral daily  atorvastatin 20 milliGRAM(s) Oral at bedtime  bisacodyl 5 milliGRAM(s) Oral every 12 hours  ceFAZolin   IVPB 1000 milliGRAM(s) IV Intermittent every 8 hours  chlorhexidine 4% Liquid 1 Application(s) Topical <User Schedule>  dextrose 50% Injectable 50 milliLiter(s) IV Push every 15 minutes  dextrose 50% Injectable 25 milliLiter(s) IV Push every 15 minutes  DOBUTamine Infusion 0.5 MICROgram(s)/kG/Min (1.44 mL/Hr) IV Continuous <Continuous>  famotidine    Tablet 20 milliGRAM(s) Oral two times a day  heparin   Injectable 3000 Unit(s) SubCutaneous every 12 hours  insulin regular Infusion 1 Unit(s)/Hr (1 mL/Hr) IV Continuous <Continuous>  ipratropium    for Nebulization 500 MICROGram(s) Nebulizer every 6 hours  meperidine     Injectable 25 milliGRAM(s) IV Push once  multiple electrolytes Injection Type 1 500 milliLiter(s) (500 mL/Hr) IV Continuous <Continuous>  nitroglycerin  Infusion 5 MICROgram(s)/Min (1.5 mL/Hr) IV Continuous <Continuous>  polyethylene glycol 3350 17 Gram(s) Oral daily  senna 2 Tablet(s) Oral at bedtime  sodium chloride 0.9%. 1000 milliLiter(s) (20 mL/Hr) IV Continuous <Continuous>    MEDICATIONS  (PRN):  ondansetron  IVPB 4 milliGRAM(s) IV Intermittent once PRN Nausea and/or Vomiting  oxyCODONE    IR 10 milliGRAM(s) Oral every 4 hours PRN Severe Pain (7 - 10)  oxyCODONE    IR 5 milliGRAM(s) Oral every 4 hours PRN Moderate Pain (4 - 6)      LABS:  ABG - ( 26 Jun 2021 03:52 )  pH, Arterial: 7.45  pH, Blood: x     /  pCO2: 36    /  pO2: 74    / HCO3: 25    / Base Excess: 1.2   /  SaO2: 97                                      10.1   16.36 )-----------( 173      ( 26 Jun 2021 04:30 )             29.0     06-26    137  |  105  |  9<L>  ----------------------------<  51<LL>  3.5   |  23  |  0.7    Ca    8.5      26 Jun 2021 04:30  Mg     2.2     06-26    TPro  5.3<L>  /  Alb  3.8  /  TBili  1.2  /  DBili  x   /  AST  25  /  ALT  15  /  AlkPhos  51  06-26    PT/INR - ( 25 Jun 2021 02:35 )   PT: 15.00 sec;   INR: 1.30 ratio         PTT - ( 25 Jun 2021 02:35 )  PTT:30.5 sec        RADIOLOGY:  Reviewed and interpreted by me  CXR from 06-26-21 shows [+] mild congestion, [-] pneumothorax, [-] R/L effusion, [-] cardiomegaly,    Chest Tubes in place    ECG:  Reviewed and interpreted by me: SR [id number]  QTC: [id number]    Assessment:  CAD SP CABG  hyperglycemia  hypokalemia    PAST MEDICAL & SURGICAL HISTORY:      PLAN:  Neuro: Pain control  Pulm: Encourage coughing, deep breathing and use of incentive spirometry. Wean off supplemental oxygen as able. Daily CXR.   Cardio: Monitor telemetry/alarms. Continue cardiac meds  GI: Tolerating diet. Continue stool softeners. Continue GI prophylaxis  Renal: monitor urine output, supplement electrolytes as needed  Vasc: Heparin SC/SCDs for DVT prophylaxis  Heme: Monitor H/H.   ID: Off antibiotics. Stable.  Endocrine: Monitor finger stick blood sugar and control hyperglycemia with insulin  Physical Therapy: OOB/ambulate  Tubes: Monitor Chest tube output      Discussed with Cardiothoracic Team at AM rounds.

## 2021-06-26 NOTE — PROGRESS NOTE ADULT - SUBJECTIVE AND OBJECTIVE BOX
OPERATIVE PROCEDURE(s):                POD #                       60yMale  SURGEON(s): BEBA Tovar  SUBJECTIVE ASSESSMENT:   Vital Signs Last 24 Hrs  T(F): 98.8 (26 Jun 2021 08:00), Max: 99 (26 Jun 2021 04:00)  HR: 92 (26 Jun 2021 09:00) (80 - 104)  BP: 92/59 (26 Jun 2021 08:00) (92/59 - 116/57)  BP(mean): 71 (26 Jun 2021 08:00) (68 - 79)  ABP: 116/55 (26 Jun 2021 09:00) (81/41 - 143/62)  ABP(mean): 73 (26 Jun 2021 09:00)  RR: 24 (26 Jun 2021 09:00) (18 - 50)  SpO2: 98% (26 Jun 2021 09:00) (94% - 100%)  CVP(mm Hg): --  CVP(cm H2O): --  CO: 8.8 (25 Jun 2021 10:00)  CI: 3.9 (25 Jun 2021 10:00)  PA: --  SVR: --    I&O's Detail    25 Jun 2021 07:01  -  26 Jun 2021 07:00  --------------------------------------------------------  IN:    Amiodarone: 400.4 mL    DOBUTamine: 54 mL    Insulin: 160 mL    IV PiggyBack: 1000 mL    multiple electrolytes Injection Type 1.: 480 mL    Norepinephrine: 39 mL    Oral Fluid: 1080 mL  Total IN: 3213.4 mL    OUT:    Chest Tube (mL): 335 mL    Chest Tube (mL): 205 mL    Indwelling Catheter - Urethral (mL): 1830 mL  Total OUT: 2370 mL        Net:   I&O's Detail    24 Jun 2021 07:01  -  25 Jun 2021 07:00  --------------------------------------------------------  Total NET: 734.6 mL      25 Jun 2021 07:01  -  26 Jun 2021 07:00  --------------------------------------------------------  Total NET: 843.4 mL        CAPILLARY BLOOD GLUCOSE      POCT Blood Glucose.: 199 mg/dL (26 Jun 2021 09:20)  POCT Blood Glucose.: 218 mg/dL (26 Jun 2021 07:56)  POCT Blood Glucose.: 168 mg/dL (26 Jun 2021 06:35)  POCT Blood Glucose.: 83 mg/dL (26 Jun 2021 05:15)  POCT Blood Glucose.: 54 mg/dL (26 Jun 2021 04:14)  POCT Blood Glucose.: 98 mg/dL (26 Jun 2021 01:57)  POCT Blood Glucose.: 151 mg/dL (26 Jun 2021 00:57)  POCT Blood Glucose.: 159 mg/dL (26 Jun 2021 00:06)  POCT Blood Glucose.: 200 mg/dL (25 Jun 2021 22:42)  POCT Blood Glucose.: 151 mg/dL (25 Jun 2021 20:17)  POCT Blood Glucose.: 178 mg/dL (25 Jun 2021 18:32)  POCT Blood Glucose.: 152 mg/dL (25 Jun 2021 17:23)  POCT Blood Glucose.: 122 mg/dL (25 Jun 2021 14:11)  POCT Blood Glucose.: 142 mg/dL (25 Jun 2021 12:40)  POCT Blood Glucose.: 176 mg/dL (25 Jun 2021 11:22)    Physical Exam:  General: NAD; A&Ox3/Patient is intubated and sedated  Cardiac: S1/S2, RRR, no murmur, no rubs  Lungs: unlabored respirations, CTA b/l, no wheeze, no rales, no crackles  Abdomen: Soft/NT/ND; positive bowel sounds x 4  Sternum: Intact, no click, incision healing well with no drainage  Incisions: Incisions clean/dry/intact  Extremities: No edema b/l lower extremities; good capillary refill; no cyanosis; palpable 1+ pedal pulses b/l    Central Venous Catheter: Yes[]  No[] , If Yes indication:           Day #  [place] Catheter: Yes  [] , No  [] , If yes indication:                      Day #  NGT: Yes [] No [] ,    If Yes Placement:                                     Day #  EPICARDIAL WIRES:  [] YES [] NO                                              Day #  BOWEL MOVEMENT:  [] YES [] NO, If No, Timing since last BM:      Day #  CHEST TUBE(Left/Right):  [] YES [] NO, If yes -  AIR LEAKS:  [] YES [] NO        LABS:                        10.1<L>  16.36<H> )-----------( 173      ( 26 Jun 2021 04:30 )             29.0<L>                        9.7<L>  12.28<H> )-----------( 139      ( 26 Jun 2021 01:30 )             28.3<L>    06-26    137  |  105  |  9<L>  ----------------------------<  51<LL>  3.5   |  23  |  0.7  06-26    135  |  105  |  11  ----------------------------<  125<H>  3.7   |  22  |  0.8    Ca    8.5      26 Jun 2021 04:30  Mg     2.2     06-26    TPro  5.3<L> [6.0 - 8.0]  /  Alb  3.8 [3.5 - 5.2]  /  TBili  1.2 [0.2 - 1.2]  /  DBili  x   /  AST  25 [0 - 41]  /  ALT  15 [0 - 41]  /  AlkPhos  51 [30 - 115]  06-26    PT/INR - ( 25 Jun 2021 02:35 )   PT: ;   INR: 1.30 ratio         PT/INR - ( 24 Jun 2021 18:02 )   PT: ;   INR: 1.27 ratio         PTT - ( 25 Jun 2021 02:35 )  PTT:30.5 sec, PTT - ( 24 Jun 2021 18:02 )  PTT:36.6 sec    ABG - ( 26 Jun 2021 03:52 )  pH: 7.45  /  pCO2: 36    /  pO2: 74    / HCO3: 25    / Base Excess: 1.2   /  SaO2: 97    /  LA: 0.8              RADIOLOGY & ADDITIONAL TESTS:  CXR:  EKG:  MEDICATIONS  (STANDING):  acetaminophen  IVPB .. 1000 milliGRAM(s) IV Intermittent once  acetaminophen  IVPB .. 1000 milliGRAM(s) IV Intermittent once  acetaminophen  IVPB .. 1000 milliGRAM(s) IV Intermittent once  aMIOdarone Infusion 1 mG/Min (33.3 mL/Hr) IV Continuous <Continuous>  aspirin enteric coated 325 milliGRAM(s) Oral daily  atorvastatin 20 milliGRAM(s) Oral at bedtime  bisacodyl 5 milliGRAM(s) Oral every 12 hours  ceFAZolin   IVPB 1000 milliGRAM(s) IV Intermittent every 8 hours  chlorhexidine 4% Liquid 1 Application(s) Topical <User Schedule>  dextrose 50% Injectable 50 milliLiter(s) IV Push every 15 minutes  dextrose 50% Injectable 25 milliLiter(s) IV Push every 15 minutes  DOBUTamine Infusion 0.5 MICROgram(s)/kG/Min (1.44 mL/Hr) IV Continuous <Continuous>  famotidine    Tablet 20 milliGRAM(s) Oral two times a day  heparin   Injectable 3000 Unit(s) SubCutaneous every 12 hours  insulin regular Infusion 1 Unit(s)/Hr (1 mL/Hr) IV Continuous <Continuous>  ipratropium    for Nebulization 500 MICROGram(s) Nebulizer every 6 hours  meperidine     Injectable 25 milliGRAM(s) IV Push once  multiple electrolytes Injection Type 1 500 milliLiter(s) (500 mL/Hr) IV Continuous <Continuous>  nitroglycerin  Infusion 5 MICROgram(s)/Min (1.5 mL/Hr) IV Continuous <Continuous>  polyethylene glycol 3350 17 Gram(s) Oral daily  senna 2 Tablet(s) Oral at bedtime  sodium chloride 0.9%. 1000 milliLiter(s) (20 mL/Hr) IV Continuous <Continuous>    MEDICATIONS  (PRN):  ondansetron  IVPB 4 milliGRAM(s) IV Intermittent once PRN Nausea and/or Vomiting  oxyCODONE    IR 10 milliGRAM(s) Oral every 4 hours PRN Severe Pain (7 - 10)  oxyCODONE    IR 5 milliGRAM(s) Oral every 4 hours PRN Moderate Pain (4 - 6)    HEPARIN:  [] YES [] NO  Dose: XX UNITS/HR UNITS Q8H  LOVENOX:[] YES [] NO  Dose: XX mg Q24H  COUMADIN: []  YES [] NO  Dose: XX mg  Q24H  SCD's: YES b/l  GI Prophylaxis: Protonix [], Pepcid [], None [], (Contra-indication:.....)    Post-Op Beta-Blockers: Yes [], No[], If No, then contraindication:  Post-Op Aspirin: Yes [],  No [], If No, then contraindication:  Post-Op Statin: Yes [], No[], If No, then contraindication:  Allergies    No Known Allergies    Intolerances      Ambulation/Activity Status:    Assessment/Plan:  60y Male status-post .....  - Case and plan discussed with CTU Intensivist and CT Surgeon - Dr. Wilkins/Jesi/Guy  - Continue CTU supportive care    - Continue DVT/GI prophylaxis  - Incentive Spirometry 10 times an hour  - Continue to advance physical activity as tolerated and continue PT/OT as directed  1. CAD: Continue ASA, statin, BB  2. HTN:   3. A. Fib:   4. COPD/Hypoxia:   5. DM/Glucose Control:     Social Service Disposition:     OPERATIVE PROCEDURE(s):    CABGx4 with radial            POD #        2               60yMale  SURGEON(s): BEBA Tovar  SUBJECTIVE ASSESSMENT: pt seen and examined. no acute complaints at this time  Vital Signs Last 24 Hrs  T(F): 98.8 (26 Jun 2021 08:00), Max: 99 (26 Jun 2021 04:00)  HR: 92 (26 Jun 2021 09:00) (80 - 104)  BP: 92/59 (26 Jun 2021 08:00) (92/59 - 116/57)  BP(mean): 71 (26 Jun 2021 08:00) (68 - 79)  ABP: 116/55 (26 Jun 2021 09:00) (81/41 - 143/62)  ABP(mean): 73 (26 Jun 2021 09:00)  RR: 24 (26 Jun 2021 09:00) (18 - 50)  SpO2: 98% (26 Jun 2021 09:00) (94% - 100%)  CO: 8.8 (25 Jun 2021 10:00)  CI: 3.9 (25 Jun 2021 10:00)    I&O's Detail    25 Jun 2021 07:01  -  26 Jun 2021 07:00  --------------------------------------------------------  IN:    Amiodarone: 400.4 mL    DOBUTamine: 54 mL    Insulin: 160 mL    IV PiggyBack: 1000 mL    multiple electrolytes Injection Type 1.: 480 mL    Norepinephrine: 39 mL    Oral Fluid: 1080 mL  Total IN: 3213.4 mL    OUT:    Chest Tube (mL): 335 mL    Chest Tube (mL): 205 mL    Indwelling Catheter - Urethral (mL): 1830 mL  Total OUT: 2370 mL        Net:   I&O's Detail    24 Jun 2021 07:01  -  25 Jun 2021 07:00  --------------------------------------------------------  Total NET: 734.6 mL      25 Jun 2021 07:01  -  26 Jun 2021 07:00  --------------------------------------------------------  Total NET: 843.4 mL        CAPILLARY BLOOD GLUCOSE      POCT Blood Glucose.: 199 mg/dL (26 Jun 2021 09:20)  POCT Blood Glucose.: 218 mg/dL (26 Jun 2021 07:56)  POCT Blood Glucose.: 168 mg/dL (26 Jun 2021 06:35)  POCT Blood Glucose.: 83 mg/dL (26 Jun 2021 05:15)  POCT Blood Glucose.: 54 mg/dL (26 Jun 2021 04:14)  POCT Blood Glucose.: 98 mg/dL (26 Jun 2021 01:57)  POCT Blood Glucose.: 151 mg/dL (26 Jun 2021 00:57)  POCT Blood Glucose.: 159 mg/dL (26 Jun 2021 00:06)  POCT Blood Glucose.: 200 mg/dL (25 Jun 2021 22:42)  POCT Blood Glucose.: 151 mg/dL (25 Jun 2021 20:17)  POCT Blood Glucose.: 178 mg/dL (25 Jun 2021 18:32)  POCT Blood Glucose.: 152 mg/dL (25 Jun 2021 17:23)  POCT Blood Glucose.: 122 mg/dL (25 Jun 2021 14:11)  POCT Blood Glucose.: 142 mg/dL (25 Jun 2021 12:40)  POCT Blood Glucose.: 176 mg/dL (25 Jun 2021 11:22)    Physical Exam:  General: NAD; A&Ox3  Cardiac: S1/S2, RRR, no murmur, no rubs  Lungs: decreased bs at bases bilaterally   Abdomen: Soft/NT/ND; positive bowel sounds x 4  Sternum: Intact, no click, incision healing well with no drainage  Incisions: Incisions clean/dry/intact  Extremities: No edema b/l lower extremities; good capillary refill; no cyanosis; palpable 1+ pedal pulses b/l    Central Venous Catheter: Yes[x]  No[] , If Yes indication:       hd unstable      Day #2  [place] Catheter: Yes  [x] , No  [] , If yes indication:  strict i.o                  Day #2  NGT: Yes [] No [x] ,    If Yes Placement:                                     Day #  EPICARDIAL WIRES:  [x] YES [] NO                                              Day #2  BOWEL MOVEMENT:  [] YES [x] NO, If No, Timing since last BM:      Day #  CHEST TUBE(Left/Right):  [x] YES [] NO, If yes -  AIR LEAKS:  [] YES [] NO        LABS:                        10.1<L>  16.36<H> )-----------( 173      ( 26 Jun 2021 04:30 )             29.0<L>                        9.7<L>  12.28<H> )-----------( 139      ( 26 Jun 2021 01:30 )             28.3<L>    06-26    137  |  105  |  9<L>  ----------------------------<  51<LL>  3.5   |  23  |  0.7  06-26    135  |  105  |  11  ----------------------------<  125<H>  3.7   |  22  |  0.8    Ca    8.5      26 Jun 2021 04:30  Mg     2.2     06-26    TPro  5.3<L> [6.0 - 8.0]  /  Alb  3.8 [3.5 - 5.2]  /  TBili  1.2 [0.2 - 1.2]  /  DBili  x   /  AST  25 [0 - 41]  /  ALT  15 [0 - 41]  /  AlkPhos  51 [30 - 115]  06-26    PT/INR - ( 25 Jun 2021 02:35 )   PT: ;   INR: 1.30 ratio         PT/INR - ( 24 Jun 2021 18:02 )   PT: ;   INR: 1.27 ratio         PTT - ( 25 Jun 2021 02:35 )  PTT:30.5 sec, PTT - ( 24 Jun 2021 18:02 )  PTT:36.6 sec    ABG - ( 26 Jun 2021 03:52 )  pH: 7.45  /  pCO2: 36    /  pO2: 74    / HCO3: 25    / Base Excess: 1.2   /  SaO2: 97    /  LA: 0.8              RADIOLOGY & ADDITIONAL TESTS:  CXR: < from: Xray Chest 1 View- PORTABLE-Routine (06.25.21 @ 06:22) >  Impression:  Interval extubation and removal of enteric tube. Stable additional support devices.  No radiographic evidence of acute cardiopulmonary disease.    < end of copied text >    EKG: < from: 12 Lead ECG (06.26.21 @ 07:27) >    Ventricular Rate 91 BPM    Atrial Rate 91 BPM    P-R Interval 144 ms    QRS Duration 92 ms    Q-T Interval 364 ms    QTC Calculation(Bazett) 447 ms    P Axis 47 degrees    R Axis -5 degrees    T Axis 8 degrees    Diagnosis Line Normal sinus rhythm  Inferior infarct , age undetermined  Abnormal ECG    < end of copied text >    MEDICATIONS  (STANDING):  acetaminophen  IVPB .. 1000 milliGRAM(s) IV Intermittent once  acetaminophen  IVPB .. 1000 milliGRAM(s) IV Intermittent once  acetaminophen  IVPB .. 1000 milliGRAM(s) IV Intermittent once  aMIOdarone Infusion 1 mG/Min (33.3 mL/Hr) IV Continuous <Continuous>  aspirin enteric coated 325 milliGRAM(s) Oral daily  atorvastatin 20 milliGRAM(s) Oral at bedtime  bisacodyl 5 milliGRAM(s) Oral every 12 hours  ceFAZolin   IVPB 1000 milliGRAM(s) IV Intermittent every 8 hours  chlorhexidine 4% Liquid 1 Application(s) Topical <User Schedule>  dextrose 50% Injectable 50 milliLiter(s) IV Push every 15 minutes  dextrose 50% Injectable 25 milliLiter(s) IV Push every 15 minutes  DOBUTamine Infusion 0.5 MICROgram(s)/kG/Min (1.44 mL/Hr) IV Continuous <Continuous>  famotidine    Tablet 20 milliGRAM(s) Oral two times a day  heparin   Injectable 3000 Unit(s) SubCutaneous every 12 hours  insulin regular Infusion 1 Unit(s)/Hr (1 mL/Hr) IV Continuous <Continuous>  ipratropium    for Nebulization 500 MICROGram(s) Nebulizer every 6 hours  meperidine     Injectable 25 milliGRAM(s) IV Push once  multiple electrolytes Injection Type 1 500 milliLiter(s) (500 mL/Hr) IV Continuous <Continuous>  nitroglycerin  Infusion 5 MICROgram(s)/Min (1.5 mL/Hr) IV Continuous <Continuous>  polyethylene glycol 3350 17 Gram(s) Oral daily  senna 2 Tablet(s) Oral at bedtime  sodium chloride 0.9%. 1000 milliLiter(s) (20 mL/Hr) IV Continuous <Continuous>    MEDICATIONS  (PRN):  ondansetron  IVPB 4 milliGRAM(s) IV Intermittent once PRN Nausea and/or Vomiting  oxyCODONE    IR 10 milliGRAM(s) Oral every 4 hours PRN Severe Pain (7 - 10)  oxyCODONE    IR 5 milliGRAM(s) Oral every 4 hours PRN Moderate Pain (4 - 6)    HEPARIN:  [x] YES [] NO  Dose: 3000 UNITS Q12H    SCD's: YES b/l  GI Prophylaxis: Protonix [], Pepcid [x], None [], (Contra-indication:.....)    Post-Op Beta-Blockers: Yes [x], No[], If No, then contraindication:  Post-Op Aspirin: Yes [x],  No [], If No, then contraindication:  Post-Op Statin: Yes [x], No[], If No, then contraindication:  Allergies    No Known Allergies    Intolerances      Ambulation/Activity Status: ambulate     Assessment/Plan:  60y Male status-post CABGx4 + radial POD#2  - Case and plan discussed with CTU Intensivist and CT Surgeon - Dr. Wilkins/Jesi/Guy  - Continue CTU supportive care    - Continue DVT/GI prophylaxis  - Incentive Spirometry 10 times an hour  - Continue to advance physical activity as tolerated and continue PT/OT as directed  1. CAD: Continue ASA, statin, start bb today, hold norvasc for now as bp is borderline with lopressor  2. HTN: bb  3. A. Fib: amio gtt  4. COPD/Hypoxia: cont nebs, wean o2 as tolerated, encourage incentive spirometry, lasix for fluid overload  5. DM/Glucose Control: start lantus 20, metformin and sliding scale, d/c insulin gtt    Social Service Disposition:  pt to assess

## 2021-06-26 NOTE — PROGRESS NOTE ADULT - ATTENDING COMMENTS
POD # 1  S/p urgent CABG X 4  Doing well  hemodynamically stable  Neuro intact  Extubated uneventfully.  Overall making excellent progress.
Patient was seen and examined as described above  Denies any chest pain or shortness of breath    Was admitted with a non-ST elevation myocardial infarction a few days ago as described in my previous note  Has had PCI with stenting in the past  Previous myocardial infarction  History of hypertension and hypercholesterolemia  Poorly controlled diabetes with a hemoglobin A1c of 12  Has peripheral neuropathy    Work-up revealed bilateral 50 to 70% carotid artery disease with cerebrovascular disease    We once again reviewed his cardiac catheterization which shows diffuse triple-vessel coronary artery disease as described    Patient had multiple questions regarding the surgery and finally made a decision to proceed with surgery even though he had told me a couple of days ago that he was willing for surgery but he was not sure completely.  We also discussed with the patient and offered him the opportunity to be part of the NAUN trial which is a surgical revascularization trial from the [employer].  We discussed the entire protocol of the trial regarding a second arterial graft versus venous grafting as a second target in addition to the left internal mammary artery and the patient voiced acceptance to be enrolled in the trial and signed the consents for the same.    We once again went over the surgical procedure in detail.  Today's meeting was once again important and that the patient finally agreed to the operation and this was the decision for surgery.  I discussed the risks, benefits, and alternatives to the surgical procedure in detail.  The risks that we discussed included but were not limited to bleeding, infection, stroke, myocardial infarction, renal failure, multi-organ failure, death, etc. amongst others were discussed in detail.  All questions. were answered.  The patient and the family want to proceed with the high risk intervention.
Postop day 2  That is post urgent coronary artery bypass grafting x4  Doing well  Hemodynamically stable  Ambulating very well    Still needs tight blood glucose control  Will start removing chest tubes today    Continue beta-blockers, aspirin and a statin  Will most likely add Plavix for diffuse diabetic disease    I discussed with patient and his family at the bedside  All questions answered
#Progress Note Handoff  Pending (specify):  pending CABG w/u will be transferred CTS once on 3C  Family discussion: house staff updated pt family  Disposition: 3C, transfer to CTS

## 2021-06-27 LAB
ALBUMIN SERPL ELPH-MCNC: 3.3 G/DL — LOW (ref 3.5–5.2)
ALP SERPL-CCNC: 59 U/L — SIGNIFICANT CHANGE UP (ref 30–115)
ALT FLD-CCNC: 12 U/L — SIGNIFICANT CHANGE UP (ref 0–41)
ANION GAP SERPL CALC-SCNC: 10 MMOL/L — SIGNIFICANT CHANGE UP (ref 7–14)
AST SERPL-CCNC: 16 U/L — SIGNIFICANT CHANGE UP (ref 0–41)
BASOPHILS # BLD AUTO: 0.02 K/UL — SIGNIFICANT CHANGE UP (ref 0–0.2)
BASOPHILS NFR BLD AUTO: 0.2 % — SIGNIFICANT CHANGE UP (ref 0–1)
BILIRUB SERPL-MCNC: 0.7 MG/DL — SIGNIFICANT CHANGE UP (ref 0.2–1.2)
BUN SERPL-MCNC: 11 MG/DL — SIGNIFICANT CHANGE UP (ref 10–20)
CALCIUM SERPL-MCNC: 8.5 MG/DL — SIGNIFICANT CHANGE UP (ref 8.5–10.1)
CHLORIDE SERPL-SCNC: 102 MMOL/L — SIGNIFICANT CHANGE UP (ref 98–110)
CO2 SERPL-SCNC: 24 MMOL/L — SIGNIFICANT CHANGE UP (ref 17–32)
CREAT SERPL-MCNC: 0.7 MG/DL — SIGNIFICANT CHANGE UP (ref 0.7–1.5)
EOSINOPHIL # BLD AUTO: 0.04 K/UL — SIGNIFICANT CHANGE UP (ref 0–0.7)
EOSINOPHIL NFR BLD AUTO: 0.3 % — SIGNIFICANT CHANGE UP (ref 0–8)
GLUCOSE BLDC GLUCOMTR-MCNC: 171 MG/DL — HIGH (ref 70–99)
GLUCOSE BLDC GLUCOMTR-MCNC: 187 MG/DL — HIGH (ref 70–99)
GLUCOSE BLDC GLUCOMTR-MCNC: 206 MG/DL — HIGH (ref 70–99)
GLUCOSE BLDC GLUCOMTR-MCNC: 214 MG/DL — HIGH (ref 70–99)
GLUCOSE BLDC GLUCOMTR-MCNC: 217 MG/DL — HIGH (ref 70–99)
GLUCOSE SERPL-MCNC: 211 MG/DL — HIGH (ref 70–99)
HCT VFR BLD CALC: 27.9 % — LOW (ref 42–52)
HGB BLD-MCNC: 9.6 G/DL — LOW (ref 14–18)
IMM GRANULOCYTES NFR BLD AUTO: 0.5 % — HIGH (ref 0.1–0.3)
LYMPHOCYTES # BLD AUTO: 1.55 K/UL — SIGNIFICANT CHANGE UP (ref 1.2–3.4)
LYMPHOCYTES # BLD AUTO: 13.1 % — LOW (ref 20.5–51.1)
MAGNESIUM SERPL-MCNC: 1.9 MG/DL — SIGNIFICANT CHANGE UP (ref 1.8–2.4)
MCHC RBC-ENTMCNC: 29.6 PG — SIGNIFICANT CHANGE UP (ref 27–31)
MCHC RBC-ENTMCNC: 34.4 G/DL — SIGNIFICANT CHANGE UP (ref 32–37)
MCV RBC AUTO: 86.1 FL — SIGNIFICANT CHANGE UP (ref 80–94)
MONOCYTES # BLD AUTO: 1.08 K/UL — HIGH (ref 0.1–0.6)
MONOCYTES NFR BLD AUTO: 9.1 % — SIGNIFICANT CHANGE UP (ref 1.7–9.3)
NEUTROPHILS # BLD AUTO: 9.12 K/UL — HIGH (ref 1.4–6.5)
NEUTROPHILS NFR BLD AUTO: 76.8 % — HIGH (ref 42.2–75.2)
NRBC # BLD: 0 /100 WBCS — SIGNIFICANT CHANGE UP (ref 0–0)
PLATELET # BLD AUTO: 158 K/UL — SIGNIFICANT CHANGE UP (ref 130–400)
POTASSIUM SERPL-MCNC: 4.2 MMOL/L — SIGNIFICANT CHANGE UP (ref 3.5–5)
POTASSIUM SERPL-SCNC: 4.2 MMOL/L — SIGNIFICANT CHANGE UP (ref 3.5–5)
PROT SERPL-MCNC: 5.1 G/DL — LOW (ref 6–8)
RBC # BLD: 3.24 M/UL — LOW (ref 4.7–6.1)
RBC # FLD: 13.1 % — SIGNIFICANT CHANGE UP (ref 11.5–14.5)
SODIUM SERPL-SCNC: 136 MMOL/L — SIGNIFICANT CHANGE UP (ref 135–146)
WBC # BLD: 11.87 K/UL — HIGH (ref 4.8–10.8)
WBC # FLD AUTO: 11.87 K/UL — HIGH (ref 4.8–10.8)

## 2021-06-27 PROCEDURE — 71045 X-RAY EXAM CHEST 1 VIEW: CPT | Mod: 26

## 2021-06-27 PROCEDURE — 93010 ELECTROCARDIOGRAM REPORT: CPT

## 2021-06-27 RX ORDER — ACETAMINOPHEN 500 MG
1000 TABLET ORAL ONCE
Refills: 0 | Status: COMPLETED | OUTPATIENT
Start: 2021-06-27 | End: 2021-06-27

## 2021-06-27 RX ORDER — FUROSEMIDE 40 MG
20 TABLET ORAL ONCE
Refills: 0 | Status: COMPLETED | OUTPATIENT
Start: 2021-06-27 | End: 2021-06-27

## 2021-06-27 RX ORDER — POTASSIUM CHLORIDE 20 MEQ
20 PACKET (EA) ORAL ONCE
Refills: 0 | Status: COMPLETED | OUTPATIENT
Start: 2021-06-27 | End: 2021-06-27

## 2021-06-27 RX ADMIN — METFORMIN HYDROCHLORIDE 1000 MILLIGRAM(S): 850 TABLET ORAL at 17:05

## 2021-06-27 RX ADMIN — Medication 2: at 07:15

## 2021-06-27 RX ADMIN — Medication 500 MICROGRAM(S): at 21:32

## 2021-06-27 RX ADMIN — Medication 20 MILLIGRAM(S): at 09:40

## 2021-06-27 RX ADMIN — OXYCODONE HYDROCHLORIDE 10 MILLIGRAM(S): 5 TABLET ORAL at 11:45

## 2021-06-27 RX ADMIN — Medication 20 MILLIEQUIVALENT(S): at 06:14

## 2021-06-27 RX ADMIN — Medication 325 MILLIGRAM(S): at 11:08

## 2021-06-27 RX ADMIN — OXYCODONE HYDROCHLORIDE 10 MILLIGRAM(S): 5 TABLET ORAL at 06:20

## 2021-06-27 RX ADMIN — OXYCODONE HYDROCHLORIDE 10 MILLIGRAM(S): 5 TABLET ORAL at 22:30

## 2021-06-27 RX ADMIN — Medication 1: at 17:05

## 2021-06-27 RX ADMIN — HEPARIN SODIUM 3000 UNIT(S): 5000 INJECTION INTRAVENOUS; SUBCUTANEOUS at 17:05

## 2021-06-27 RX ADMIN — OXYCODONE HYDROCHLORIDE 10 MILLIGRAM(S): 5 TABLET ORAL at 05:50

## 2021-06-27 RX ADMIN — Medication 2: at 12:04

## 2021-06-27 RX ADMIN — OXYCODONE HYDROCHLORIDE 10 MILLIGRAM(S): 5 TABLET ORAL at 22:05

## 2021-06-27 RX ADMIN — OXYCODONE HYDROCHLORIDE 10 MILLIGRAM(S): 5 TABLET ORAL at 11:12

## 2021-06-27 RX ADMIN — Medication 1000 MILLIGRAM(S): at 00:30

## 2021-06-27 RX ADMIN — ATORVASTATIN CALCIUM 20 MILLIGRAM(S): 80 TABLET, FILM COATED ORAL at 22:05

## 2021-06-27 RX ADMIN — FAMOTIDINE 20 MILLIGRAM(S): 10 INJECTION INTRAVENOUS at 05:37

## 2021-06-27 RX ADMIN — Medication 12.5 MILLIGRAM(S): at 05:37

## 2021-06-27 RX ADMIN — FAMOTIDINE 20 MILLIGRAM(S): 10 INJECTION INTRAVENOUS at 17:05

## 2021-06-27 RX ADMIN — Medication 400 MILLIGRAM(S): at 12:10

## 2021-06-27 RX ADMIN — Medication 100 MILLIGRAM(S): at 06:15

## 2021-06-27 RX ADMIN — POLYETHYLENE GLYCOL 3350 17 GRAM(S): 17 POWDER, FOR SOLUTION ORAL at 11:08

## 2021-06-27 RX ADMIN — Medication 1000 MILLIGRAM(S): at 13:00

## 2021-06-27 RX ADMIN — Medication 5 MILLIGRAM(S): at 05:37

## 2021-06-27 RX ADMIN — HEPARIN SODIUM 3000 UNIT(S): 5000 INJECTION INTRAVENOUS; SUBCUTANEOUS at 05:38

## 2021-06-27 RX ADMIN — INSULIN GLARGINE 20 UNIT(S): 100 INJECTION, SOLUTION SUBCUTANEOUS at 07:15

## 2021-06-27 RX ADMIN — METFORMIN HYDROCHLORIDE 1000 MILLIGRAM(S): 850 TABLET ORAL at 07:17

## 2021-06-27 RX ADMIN — Medication 12.5 MILLIGRAM(S): at 17:05

## 2021-06-27 RX ADMIN — Medication 20 MILLIEQUIVALENT(S): at 09:40

## 2021-06-27 NOTE — PROGRESS NOTE ADULT - ASSESSMENT
NSTEMI/CAD-s/p CABG x 4-POD #3  7-[id number] support as needed to maintain SBP >100 mmHg  2-serum glucose control-insulin infusion  3-BPH-continue Flomax  4-acute blood loss anemia-stable, monitor Hb/Hct daily

## 2021-06-27 NOTE — PROGRESS NOTE ADULT - SUBJECTIVE AND OBJECTIVE BOX
OPERATIVE PROCEDURE(s):                POD #                       SURGEON(s): BEBA Tovar  SUBJECTIVE ASSESSMENT:60yMale patient seen and examined at bedside.    Vital Signs Last 24 Hrs  T(F): 99.5 (27 Jun 2021 04:00), Max: 99.5 (27 Jun 2021 04:00)  HR: 82 (27 Jun 2021 06:37) (82 - 96)  BP: 92/59 (26 Jun 2021 08:00) (92/59 - 92/59)  BP(mean): 71 (26 Jun 2021 08:00) (71 - 71)  ABP: 105/49 (27 Jun 2021 06:37) (98/46 - 130/60)  ABP(mean): 68 (27 Jun 2021 06:37)  RR: 21 (27 Jun 2021 06:37) (20 - 35)  SpO2: 94% (27 Jun 2021 06:37) (94% - 100%)  CVP(mm Hg): --  CVP(cm H2O): --  CO: --  CI: --  PA: --  SVR: --    I&O's Detail    26 Jun 2021 07:01  -  27 Jun 2021 07:00  --------------------------------------------------------  IN:    Amiodarone: 383.6 mL    Insulin: 53 mL    IV PiggyBack: 500 mL    multiple electrolytes Injection Type 1.: 160 mL    Oral Fluid: 750 mL  Total IN: 1846.6 mL    OUT:    Chest Tube (mL): 40 mL    Chest Tube (mL): 40 mL    Indwelling Catheter - Urethral (mL): 3975 mL  Total OUT: 4055 mL        Net: I&O's Detail    25 Jun 2021 07:01  -  26 Jun 2021 07:00  --------------------------------------------------------  Total NET: 843.4 mL      26 Jun 2021 07:01  -  27 Jun 2021 07:00  --------------------------------------------------------  Total NET: -2208.4 mL        CAPILLARY BLOOD GLUCOSE      POCT Blood Glucose.: 206 mg/dL (27 Jun 2021 06:31)  POCT Blood Glucose.: 217 mg/dL (27 Jun 2021 00:08)  POCT Blood Glucose.: 131 mg/dL (26 Jun 2021 15:58)  POCT Blood Glucose.: 95 mg/dL (26 Jun 2021 14:10)  POCT Blood Glucose.: 118 mg/dL (26 Jun 2021 13:12)  POCT Blood Glucose.: 226 mg/dL (26 Jun 2021 11:42)  POCT Blood Glucose.: 239 mg/dL (26 Jun 2021 10:53)  POCT Blood Glucose.: 199 mg/dL (26 Jun 2021 09:20)  POCT Blood Glucose.: 218 mg/dL (26 Jun 2021 07:56)      Physical Exam:  General: NAD; A&Ox3  Cardiac: S1/S2, RRR, no murmur, no rubs  Lungs: unlabored respirations, CTA b/l, no wheeze, no rales, no crackles  Abdomen: Soft/NT/ND; positive bowel sounds x 4  Sternum: Intact, no click, incision healing well with no drainage  Incisions: Incisions clean/dry/intact  Extremities: No edema b/l lower extremities; good capillary refill; no cyanosis; palpable 1+ pedal pulses b/l    Central Venous Catheter: Yes[]  No[] , If Yes indication:                    Day #  Gilliland Catheter: Yes  [] , No  [] , If yes indication:                                 Day #  NGT: Yes [] No [] ,    If Yes Placement:                                                   Day #  EPICARDIAL WIRES:  [] YES [] NO                                                            Day #  BOWEL MOVEMENT:  [] YES [] NO, If No, Timing since last BM Day #  CHEST TUBE(Left/Right):  [] YES [] NO, If yes -  AIR LEAKS:  [] YES [] NO        LABS:                        9.6<L>  11.87<H> )-----------( 158      ( 27 Jun 2021 02:00 )             27.9<L>                        10.1<L>  16.36<H> )-----------( 173      ( 26 Jun 2021 04:30 )             29.0<L>    06-27    136  |  102  |  11  ----------------------------<  211<H>  4.2   |  24  |  0.7  06-26    133<L>  |  101  |  10  ----------------------------<  217<H>  4.2   |  21  |  0.7    Ca    8.5      27 Jun 2021 02:00  Mg     1.9     06-27    TPro  5.1<L> [6.0 - 8.0]  /  Alb  3.3<L> [3.5 - 5.2]  /  TBili  0.7 [0.2 - 1.2]  /  DBili  x   /  AST  16 [0 - 41]  /  ALT  12 [0 - 41]  /  AlkPhos  59 [30 - 115]  06-27        ABG - ( 27 Jun 2021 04:37 )  pH: 7.48  /  pCO2: 33    /  pO2: 87    / HCO3: 24    / Base Excess: 1.3   /  SaO2: 98    /  LA: 1.0              RADIOLOGY & ADDITIONAL TESTS:  CXR:   EKG:  Allergies    No Known Allergies    Intolerances      MEDICATIONS  (STANDING):  aMIOdarone Infusion 1 mG/Min (33.3 mL/Hr) IV Continuous <Continuous>  aspirin enteric coated 325 milliGRAM(s) Oral daily  atorvastatin 20 milliGRAM(s) Oral at bedtime  bisacodyl 5 milliGRAM(s) Oral every 12 hours  chlorhexidine 4% Liquid 1 Application(s) Topical <User Schedule>  dextrose 40% Gel 15 Gram(s) Oral once  dextrose 5%. 1000 milliLiter(s) (50 mL/Hr) IV Continuous <Continuous>  dextrose 5%. 1000 milliLiter(s) (100 mL/Hr) IV Continuous <Continuous>  dextrose 50% Injectable 50 milliLiter(s) IV Push every 15 minutes  dextrose 50% Injectable 25 milliLiter(s) IV Push every 15 minutes  famotidine    Tablet 20 milliGRAM(s) Oral two times a day  glucagon  Injectable 1 milliGRAM(s) IntraMuscular once  heparin   Injectable 3000 Unit(s) SubCutaneous every 12 hours  insulin glargine Injectable (LANTUS) 20 Unit(s) SubCutaneous every morning  insulin lispro (ADMELOG) corrective regimen sliding scale   SubCutaneous three times a day before meals  insulin regular Infusion 1 Unit(s)/Hr (1 mL/Hr) IV Continuous <Continuous>  ipratropium    for Nebulization 500 MICROGram(s) Nebulizer every 6 hours  meperidine     Injectable 25 milliGRAM(s) IV Push once  metFORMIN 1000 milliGRAM(s) Oral two times a day with meals  metoprolol tartrate 12.5 milliGRAM(s) Oral every 12 hours  multiple electrolytes Injection Type 1 500 milliLiter(s) (500 mL/Hr) IV Continuous <Continuous>  polyethylene glycol 3350 17 Gram(s) Oral daily  senna 2 Tablet(s) Oral at bedtime  sodium chloride 0.9%. 1000 milliLiter(s) (20 mL/Hr) IV Continuous <Continuous>    MEDICATIONS  (PRN):  ondansetron  IVPB 4 milliGRAM(s) IV Intermittent once PRN Nausea and/or Vomiting  oxyCODONE    IR 10 milliGRAM(s) Oral every 4 hours PRN Severe Pain (7 - 10)  oxyCODONE    IR 5 milliGRAM(s) Oral every 4 hours PRN Moderate Pain (4 - 6)    Home Medications:  alfuzosin 10 mg oral tablet, extended release: 1 tab(s) orally once a day (21 Jun 2021 01:57)  atorvastatin 20 mg oral tablet: 1 tab(s) orally once a day (21 Jun 2021 01:57)  glipiZIDE 2.5 mg oral tablet, extended release: 1 tab(s) orally once a day (21 Jun 2021 01:57)  metFORMIN 500 mg oral tablet, extended release: 1 tab(s) orally 2 times a day (21 Jun 2021 01:57)    Pharmacologic DVT Prophylaxis [] YES, [] NO: Contraindication:  [] HEPARIN: Dose: XX mg  Q24H     [] LOVENOX: Dose: XX mg  Q24H  SCD's: YES b/l    GI Prophylaxis: Protonix [], Pepcid []    Post-Op Beta-Blockers: [] Yes, [] No: contraindication:  Post-Op Nitrate: [] Yes, [] No: contraindication:  Post-Op Aspirin:  [] Yes, [] No: contraindication:  Post-Op Statin:  [] Yes, [] No: contraindication:    Ambulation/Activity Status:    Assessment/Plan:  60y Male status-post  - Case and plan discussed with CTU Intensivist and CT Surgeon - Dr. Wilkins/Jesi/Guy  - Continue CTU supportive care and ongoing plan of care as per continuing CTU rounds.   - Continue DVT/GI prophylaxis  - Incentive Spirometry 10 times an hour  - Continue to advance physical activity as tolerated and continue PT/OT as directed  1. CAD: Continue ASA, statin, BB  2. HTN:   3. A. Fib:   4. COPD/Hypoxia:   5. DM/Glucose Control:     Social Service Disposition:     OPERATIVE PROCEDURE(s): CABGx4 w/radial               POD #3                       SURGEON(s): BEBA Tovar  SUBJECTIVE ASSESSMENT:60yMale patient seen and examined at bedside.    Vital Signs Last 24 Hrs  T(F): 99.5 (27 Jun 2021 04:00), Max: 99.5 (27 Jun 2021 04:00)  HR: 82 (27 Jun 2021 06:37) (82 - 96)  BP: 92/59 (26 Jun 2021 08:00) (92/59 - 92/59)  BP(mean): 71 (26 Jun 2021 08:00) (71 - 71)  ABP: 105/49 (27 Jun 2021 06:37) (98/46 - 130/60)  ABP(mean): 68 (27 Jun 2021 06:37)  RR: 21 (27 Jun 2021 06:37) (20 - 35)  SpO2: 94% (27 Jun 2021 06:37) (94% - 100%)    I&O's Detail    26 Jun 2021 07:01  -  27 Jun 2021 07:00  --------------------------------------------------------  IN:    Amiodarone: 383.6 mL    Insulin: 53 mL    IV PiggyBack: 500 mL    multiple electrolytes Injection Type 1.: 160 mL    Oral Fluid: 750 mL  Total IN: 1846.6 mL    OUT:    Chest Tube (mL): 40 mL    Chest Tube (mL): 40 mL    Indwelling Catheter - Urethral (mL): 3975 mL  Total OUT: 4055 mL    Net: I&O's Detail    25 Jun 2021 07:01  -  26 Jun 2021 07:00  --------------------------------------------------------  Total NET: 843.4 mL    26 Jun 2021 07:01  -  27 Jun 2021 07:00  --------------------------------------------------------  Total NET: -2208.4 mL    CAPILLARY BLOOD GLUCOSE    POCT Blood Glucose.: 206 mg/dL (27 Jun 2021 06:31)  POCT Blood Glucose.: 217 mg/dL (27 Jun 2021 00:08)  POCT Blood Glucose.: 131 mg/dL (26 Jun 2021 15:58)  POCT Blood Glucose.: 95 mg/dL (26 Jun 2021 14:10)  POCT Blood Glucose.: 118 mg/dL (26 Jun 2021 13:12)  POCT Blood Glucose.: 226 mg/dL (26 Jun 2021 11:42)  POCT Blood Glucose.: 239 mg/dL (26 Jun 2021 10:53)  POCT Blood Glucose.: 199 mg/dL (26 Jun 2021 09:20)  POCT Blood Glucose.: 218 mg/dL (26 Jun 2021 07:56)    Physical Exam:  General: NAD; A&Ox3  Cardiac: S1/S2, RRR, no murmur, no rubs  Lungs: unlabored respirations, CTA b/l, no wheeze, no rales, no crackles  Abdomen: Soft/NT/ND; positive bowel sounds x 4  Sternum: Intact, no click, incision healing well with no drainage  Incisions: Incisions clean/dry/intact  Extremities: No edema b/l lower extremities; good capillary refill; no cyanosis; palpable 1+ pedal pulses b/l    Central Venous Catheter: Yes[]  No[] , If Yes indication:                    Day #  G[place] Catheter: Yes  [] , No  [] , If yes indication:                                 Day #  NGT: Yes [] No [] ,    If Yes Placement:                                                   Day #  EPICARDIAL WIRES:  [] YES [] NO                                                            Day #  BOWEL MOVEMENT:  [] YES [] NO, If No, Timing since last BM Day #  CHEST TUBE(Left/Right):  [] YES [] NO, If yes -  AIR LEAKS:  [] YES [] NO        LABS:                        9.6<L>  11.87<H> )-----------( 158      ( 27 Jun 2021 02:00 )             27.9<L>                        10.1<L>  16.36<H> )-----------( 173      ( 26 Jun 2021 04:30 )             29.0<L>    06-27    136  |  102  |  11  ----------------------------<  211<H>  4.2   |  24  |  0.7  06-26    133<L>  |  101  |  10  ----------------------------<  217<H>  4.2   |  21  |  0.7    Ca    8.5      27 Jun 2021 02:00  Mg     1.9     06-27    TPro  5.1<L> [6.0 - 8.0]  /  Alb  3.3<L> [3.5 - 5.2]  /  TBili  0.7 [0.2 - 1.2]  /  DBili  x   /  AST  16 [0 - 41]  /  ALT  12 [0 - 41]  /  AlkPhos  59 [30 - 115]  06-27      ABG - ( 27 Jun 2021 04:37 )  pH: 7.48  /  pCO2: 33    /  pO2: 87    / HCO3: 24    / Base Excess: 1.3   /  SaO2: 98    /  LA: 1.0        RADIOLOGY & ADDITIONAL TESTS:  CXR:   PROCEDURE DATE:  06/27/2021    INTERPRETATION:  Clinical History / Reason for exam: Postoperative evaluation  Comparison : Chest radiograph June 26, 2021.  Technique/Positioning: Single AP view of the chest.  Findings: Support devices: Right IJ sheath, unchanged.  Cardiac/mediastinum/hilum: Poststernotomy and CABG, unchanged.  Lung parenchyma/Pleura: Low lung volumes with pulmonary vascular congestion and left basilar opacity/effusion, unchanged. No pneumothorax.  Skeleton/soft tissues: Unchanged.  Impression: Low lung volumes with pulmonary vascular congestion and left basilar opacity/effusion, unchanged.    EKG:   Ventricular Rate 84 BPM  Atrial Rate 84 BPM  P-R Interval 152 ms  QRS Duration 96 ms  Q-T Interval 388 ms  QTC Calculation(Bazett) 458 ms  P Axis 37 degrees  R Axis -5 degrees  T Axis 0 degrees  Diagnosis Line Normal sinus rhythm  Inferior infarct , age undetermined  Abnormal ECG    Allergies    No Known Allergies    Intolerances    MEDICATIONS  (STANDING):  aMIOdarone Infusion 1 mG/Min (33.3 mL/Hr) IV Continuous <Continuous>  aspirin enteric coated 325 milliGRAM(s) Oral daily  atorvastatin 20 milliGRAM(s) Oral at bedtime  bisacodyl 5 milliGRAM(s) Oral every 12 hours  chlorhexidine 4% Liquid 1 Application(s) Topical <User Schedule>  dextrose 40% Gel 15 Gram(s) Oral once  dextrose 5%. 1000 milliLiter(s) (50 mL/Hr) IV Continuous <Continuous>  dextrose 5%. 1000 milliLiter(s) (100 mL/Hr) IV Continuous <Continuous>  dextrose 50% Injectable 50 milliLiter(s) IV Push every 15 minutes  dextrose 50% Injectable 25 milliLiter(s) IV Push every 15 minutes  famotidine    Tablet 20 milliGRAM(s) Oral two times a day  glucagon  Injectable 1 milliGRAM(s) IntraMuscular once  heparin   Injectable 3000 Unit(s) SubCutaneous every 12 hours  insulin glargine Injectable (LANTUS) 20 Unit(s) SubCutaneous every morning  insulin lispro (ADMELOG) corrective regimen sliding scale   SubCutaneous three times a day before meals  insulin regular Infusion 1 Unit(s)/Hr (1 mL/Hr) IV Continuous <Continuous>  ipratropium    for Nebulization 500 MICROGram(s) Nebulizer every 6 hours  meperidine     Injectable 25 milliGRAM(s) IV Push once  metFORMIN 1000 milliGRAM(s) Oral two times a day with meals  metoprolol tartrate 12.5 milliGRAM(s) Oral every 12 hours  multiple electrolytes Injection Type 1 500 milliLiter(s) (500 mL/Hr) IV Continuous <Continuous>  polyethylene glycol 3350 17 Gram(s) Oral daily  senna 2 Tablet(s) Oral at bedtime  sodium chloride 0.9%. 1000 milliLiter(s) (20 mL/Hr) IV Continuous <Continuous>    MEDICATIONS  (PRN):  ondansetron  IVPB 4 milliGRAM(s) IV Intermittent once PRN Nausea and/or Vomiting  oxyCODONE    IR 10 milliGRAM(s) Oral every 4 hours PRN Severe Pain (7 - 10)  oxyCODONE    IR 5 milliGRAM(s) Oral every 4 hours PRN Moderate Pain (4 - 6)    Home Medications:  alfuzosin 10 mg oral tablet, extended release: 1 tab(s) orally once a day (21 Jun 2021 01:57)  atorvastatin 20 mg oral tablet: 1 tab(s) orally once a day (21 Jun 2021 01:57)  glipiZIDE 2.5 mg oral tablet, extended release: 1 tab(s) orally once a day (21 Jun 2021 01:57)  metFORMIN 500 mg oral tablet, extended release: 1 tab(s) orally 2 times a day (21 Jun 2021 01:57)    Pharmacologic DVT Prophylaxis [x] YES, [] NO: Contraindication:  [x] HEPARIN: Dose: 3000 units Q12H    [] LOVENOX: Dose: XX mg  Q24H  SCD's: YES b/l    GI Prophylaxis: Protonix [], Pepcid [x]    Post-Op Beta-Blockers: [x] Yes, [] No: contraindication:  Post-Op Aspirin:  [x] Yes, [] No: contraindication:  Post-Op Statin:  [x] Yes, [] No: contraindication:    Ambulation/Activity Status: ambulate as tolerated    Assessment/Plan:  60y Male status-post CABGx4 w/radia; POD#3  - Case and plan discussed with CTU Intensivist and CT Surgeon - Dr. Wilkins/Jesi/Guy  - Continue CTU supportive care and ongoing plan of care as per continuing CTU rounds.   - Continue DVT/GI prophylaxis  - Incentive Spirometry 10 times an hour  - Continue to advance physical activity as tolerated and continue PT/OT as directed  1. CAD: Continue ASA, statin, start bb today, hold norvasc for now as bp is borderline with lopressor  2. HTN: bb  3. A. Fib: d/c amio ggt  4. COPD/Hypoxia: cont nebs, wean o2 as tolerated, encourage incentive spirometry, cntinue lasix for fluid overload  5. DM/Glucose Control: start lantus 20, metformin and sliding scale, d/c insulin gtt  Social Service Disposition:  d/c home   OPERATIVE PROCEDURE(s): CABGx4 w/radial               POD #3                       SURGEON(s): BEBA Tovar  SUBJECTIVE ASSESSMENT:60yMale patient seen and examined at bedside.    Vital Signs Last 24 Hrs  T(F): 99.5 (27 Jun 2021 04:00), Max: 99.5 (27 Jun 2021 04:00)  HR: 82 (27 Jun 2021 06:37) (82 - 96)  BP: 92/59 (26 Jun 2021 08:00) (92/59 - 92/59)  BP(mean): 71 (26 Jun 2021 08:00) (71 - 71)  ABP: 105/49 (27 Jun 2021 06:37) (98/46 - 130/60)  ABP(mean): 68 (27 Jun 2021 06:37)  RR: 21 (27 Jun 2021 06:37) (20 - 35)  SpO2: 94% (27 Jun 2021 06:37) (94% - 100%)    I&O's Detail    26 Jun 2021 07:01  -  27 Jun 2021 07:00  --------------------------------------------------------  IN:    Amiodarone: 383.6 mL    Insulin: 53 mL    IV PiggyBack: 500 mL    multiple electrolytes Injection Type 1.: 160 mL    Oral Fluid: 750 mL  Total IN: 1846.6 mL    OUT:    Chest Tube (mL): 40 mL    Chest Tube (mL): 40 mL    Indwelling Catheter - Urethral (mL): 3975 mL  Total OUT: 4055 mL    Net: I&O's Detail    25 Jun 2021 07:01  -  26 Jun 2021 07:00  --------------------------------------------------------  Total NET: 843.4 mL    26 Jun 2021 07:01  -  27 Jun 2021 07:00  --------------------------------------------------------  Total NET: -2208.4 mL    CAPILLARY BLOOD GLUCOSE    POCT Blood Glucose.: 206 mg/dL (27 Jun 2021 06:31)  POCT Blood Glucose.: 217 mg/dL (27 Jun 2021 00:08)  POCT Blood Glucose.: 131 mg/dL (26 Jun 2021 15:58)  POCT Blood Glucose.: 95 mg/dL (26 Jun 2021 14:10)  POCT Blood Glucose.: 118 mg/dL (26 Jun 2021 13:12)  POCT Blood Glucose.: 226 mg/dL (26 Jun 2021 11:42)  POCT Blood Glucose.: 239 mg/dL (26 Jun 2021 10:53)  POCT Blood Glucose.: 199 mg/dL (26 Jun 2021 09:20)  POCT Blood Glucose.: 218 mg/dL (26 Jun 2021 07:56)    Physical Exam:  General: NAD; A&Ox3  Cardiac: S1/S2, RRR, no murmur, no rubs  Lungs: unlabored respirations, CTA b/l, no wheeze, no rales, no crackles  Abdomen: Soft/NT/ND; positive bowel sounds x 4  Sternum: Intact, no click, incision healing well with no drainage  Incisions: Incisions clean/dry/intact  Extremities: No edema b/l lower extremities; good capillary refill; no cyanosis; palpable 1+ pedal pulses b/l    Central Venous Catheter: Yes[x]  No[] , If Yes indication:       hd unstable             Day #3  Gilliland Catheter: Yes  [x] , No  [] , If yes indication:    strict i.o                             Day #3  NGT: Yes [] No [x] ,    If Yes Placement:                                                   Day #  EPICARDIAL WIRES:  [x] YES [] NO                                                            Day #3  BOWEL MOVEMENT:  [] YES [x] NO, If No, Timing since last BM Day #  CHEST TUBE(Left/Right):  [] YES [x] NO, If yes -  AIR LEAKS:  [] YES [] NO        LABS:                        9.6<L>  11.87<H> )-----------( 158      ( 27 Jun 2021 02:00 )             27.9<L>                        10.1<L>  16.36<H> )-----------( 173      ( 26 Jun 2021 04:30 )             29.0<L>    06-27    136  |  102  |  11  ----------------------------<  211<H>  4.2   |  24  |  0.7  06-26    133<L>  |  101  |  10  ----------------------------<  217<H>  4.2   |  21  |  0.7    Ca    8.5      27 Jun 2021 02:00  Mg     1.9     06-27    TPro  5.1<L> [6.0 - 8.0]  /  Alb  3.3<L> [3.5 - 5.2]  /  TBili  0.7 [0.2 - 1.2]  /  DBili  x   /  AST  16 [0 - 41]  /  ALT  12 [0 - 41]  /  AlkPhos  59 [30 - 115]  06-27      ABG - ( 27 Jun 2021 04:37 )  pH: 7.48  /  pCO2: 33    /  pO2: 87    / HCO3: 24    / Base Excess: 1.3   /  SaO2: 98    /  LA: 1.0        RADIOLOGY & ADDITIONAL TESTS:  CXR:   PROCEDURE DATE:  06/27/2021    INTERPRETATION:  Clinical History / Reason for exam: Postoperative evaluation  Comparison : Chest radiograph June 26, 2021.  Technique/Positioning: Single AP view of the chest.  Findings: Support devices: Right IJ sheath, unchanged.  Cardiac/mediastinum/hilum: Poststernotomy and CABG, unchanged.  Lung parenchyma/Pleura: Low lung volumes with pulmonary vascular congestion and left basilar opacity/effusion, unchanged. No pneumothorax.  Skeleton/soft tissues: Unchanged.  Impression: Low lung volumes with pulmonary vascular congestion and left basilar opacity/effusion, unchanged.    EKG:   Ventricular Rate 84 BPM  Atrial Rate 84 BPM  P-R Interval 152 ms  QRS Duration 96 ms  Q-T Interval 388 ms  QTC Calculation(Bazett) 458 ms  P Axis 37 degrees  R Axis -5 degrees  T Axis 0 degrees  Diagnosis Line Normal sinus rhythm  Inferior infarct , age undetermined  Abnormal ECG    Allergies    No Known Allergies    Intolerances    MEDICATIONS  (STANDING):  aMIOdarone Infusion 1 mG/Min (33.3 mL/Hr) IV Continuous <Continuous>  aspirin enteric coated 325 milliGRAM(s) Oral daily  atorvastatin 20 milliGRAM(s) Oral at bedtime  bisacodyl 5 milliGRAM(s) Oral every 12 hours  chlorhexidine 4% Liquid 1 Application(s) Topical <User Schedule>  dextrose 40% Gel 15 Gram(s) Oral once  dextrose 5%. 1000 milliLiter(s) (50 mL/Hr) IV Continuous <Continuous>  dextrose 5%. 1000 milliLiter(s) (100 mL/Hr) IV Continuous <Continuous>  dextrose 50% Injectable 50 milliLiter(s) IV Push every 15 minutes  dextrose 50% Injectable 25 milliLiter(s) IV Push every 15 minutes  famotidine    Tablet 20 milliGRAM(s) Oral two times a day  glucagon  Injectable 1 milliGRAM(s) IntraMuscular once  heparin   Injectable 3000 Unit(s) SubCutaneous every 12 hours  insulin glargine Injectable (LANTUS) 20 Unit(s) SubCutaneous every morning  insulin lispro (ADMELOG) corrective regimen sliding scale   SubCutaneous three times a day before meals  insulin regular Infusion 1 Unit(s)/Hr (1 mL/Hr) IV Continuous <Continuous>  ipratropium    for Nebulization 500 MICROGram(s) Nebulizer every 6 hours  meperidine     Injectable 25 milliGRAM(s) IV Push once  metFORMIN 1000 milliGRAM(s) Oral two times a day with meals  metoprolol tartrate 12.5 milliGRAM(s) Oral every 12 hours  multiple electrolytes Injection Type 1 500 milliLiter(s) (500 mL/Hr) IV Continuous <Continuous>  polyethylene glycol 3350 17 Gram(s) Oral daily  senna 2 Tablet(s) Oral at bedtime  sodium chloride 0.9%. 1000 milliLiter(s) (20 mL/Hr) IV Continuous <Continuous>    MEDICATIONS  (PRN):  ondansetron  IVPB 4 milliGRAM(s) IV Intermittent once PRN Nausea and/or Vomiting  oxyCODONE    IR 10 milliGRAM(s) Oral every 4 hours PRN Severe Pain (7 - 10)  oxyCODONE    IR 5 milliGRAM(s) Oral every 4 hours PRN Moderate Pain (4 - 6)    Home Medications:  alfuzosin 10 mg oral tablet, extended release: 1 tab(s) orally once a day (21 Jun 2021 01:57)  atorvastatin 20 mg oral tablet: 1 tab(s) orally once a day (21 Jun 2021 01:57)  glipiZIDE 2.5 mg oral tablet, extended release: 1 tab(s) orally once a day (21 Jun 2021 01:57)  metFORMIN 500 mg oral tablet, extended release: 1 tab(s) orally 2 times a day (21 Jun 2021 01:57)    Pharmacologic DVT Prophylaxis [x] YES, [] NO: Contraindication:  [x] HEPARIN: Dose: 3000 units Q12H    [] LOVENOX: Dose: XX mg  Q24H  SCD's: YES b/l    GI Prophylaxis: Protonix [], Pepcid [x]    Post-Op Beta-Blockers: [x] Yes, [] No: contraindication:  Post-Op Aspirin:  [x] Yes, [] No: contraindication:  Post-Op Statin:  [x] Yes, [] No: contraindication:    Ambulation/Activity Status: ambulate as tolerated    Assessment/Plan:  60y Male status-post CABGx4 w/radia; POD#3  - Case and plan discussed with CTU Intensivist and CT Surgeon - Dr. Wilkins/Jesi/Guy  - Continue CTU supportive care and ongoing plan of care as per continuing CTU rounds.   - Continue DVT/GI prophylaxis  - Incentive Spirometry 10 times an hour  - Continue to advance physical activity as tolerated and continue PT/OT as directed  1. CAD: Continue ASA, statin, bb, hold norvasc for now as bp is borderline with lopressor  2. HTN: bb  3. A. Fib: d/c amio gtt  4. COPD/Hypoxia: cont nebs, wean o2 as tolerated, encourage incentive spirometry, continue lasix for fluid overload  5. DM/Glucose Control: start lantus 20, metformin and sliding scale, d/c insulin gtt    Social Service Disposition:   home

## 2021-06-27 NOTE — PROGRESS NOTE ADULT - SUBJECTIVE AND OBJECTIVE BOX
CTU Attending Progress Daily Note     27 Jun 2021 14:17  POD# - 3  He has history of   Interval event for past 24 hr:  CHRISTI CRABTREE  60y had no event.   Current Complains:  CHRISTI CRABTREE has no new complains  HPI:   60y M w/ hx of DM, HLD, BPH presenting with retrosternal chest pain that started last evening after dinner.. No radiation. 4/10 in severity, non exertional . Took a baby aspirin at home. Patient had episode of chest pain last week that resolved spontaneously, no previous chest pain or hx of cardiac disease Endorses mild SOB on exertion. Denies headache, N/V, abdominal pain, diarrhea, dysuria.    Was admitted for Obs,where he underwent CCTA that showed 3V disease, EKG with no ischemic changes, troponin <0.01--> 0.02, admitted for Cath in the AM, Loaded with aspirin in the ED  (21 Jun 2021 00:01)    OBJECTIVE:  ICU Vital Signs Last 24 Hrs  T(C): 37 (27 Jun 2021 12:00), Max: 37.5 (27 Jun 2021 04:00)  T(F): 98.6 (27 Jun 2021 12:00), Max: 99.5 (27 Jun 2021 04:00)  HR: 88 (27 Jun 2021 13:00) (82 - 92)  BP: 103/71 (27 Jun 2021 12:00) (103/71 - 103/71)  BP(mean): 83 (27 Jun 2021 12:00) (83 - 83)  ABP: 114/60 (27 Jun 2021 11:00) (103/51 - 130/60)  ABP(mean): 78 (27 Jun 2021 11:00) (68 - 83)  RR: 22 (27 Jun 2021 13:00) (19 - 32)  SpO2: 97% (27 Jun 2021 13:00) (94% - 100%)    I&O's Summary    26 Jun 2021 07:01  -  27 Jun 2021 07:00  --------------------------------------------------------  IN: 1863.3 mL / OUT: 4155 mL / NET: -4882.7 mL    27 Jun 2021 07:01  -  27 Jun 2021 14:17  --------------------------------------------------------  IN: 513.4 mL / OUT: 750 mL / NET: -236.6 mL      I&O's Detail    26 Jun 2021 07:01  -  27 Jun 2021 07:00  --------------------------------------------------------  IN:    Amiodarone: 400.3 mL    Insulin: 53 mL    IV PiggyBack: 500 mL    multiple electrolytes Injection Type 1.: 160 mL    Oral Fluid: 750 mL  Total IN: 1863.3 mL    OUT:    Chest Tube (mL): 40 mL    Chest Tube (mL): 40 mL    Indwelling Catheter - Urethral (mL): 4075 mL  Total OUT: 4155 mL    Total NET: -2291.7 mL      27 Jun 2021 07:01  -  27 Jun 2021 14:17  --------------------------------------------------------  IN:    Amiodarone: 33.4 mL    Oral Fluid: 480 mL  Total IN: 513.4 mL    OUT:    Indwelling Catheter - Urethral (mL): 750 mL  Total OUT: 750 mL    Total NET: -236.6 mL            CAPILLARY BLOOD GLUCOSE      POCT Blood Glucose.: 214 mg/dL (27 Jun 2021 12:01)  POCT Blood Glucose.: 206 mg/dL (27 Jun 2021 06:31)  POCT Blood Glucose.: 217 mg/dL (27 Jun 2021 00:08)  POCT Blood Glucose.: 131 mg/dL (26 Jun 2021 15:58)    LABS:  ABG - ( 27 Jun 2021 04:37 )  pH, Arterial: 7.48  pH, Blood: x     /  pCO2: 33    /  pO2: 87    / HCO3: 24    / Base Excess: 1.3   /  SaO2: 98                                      9.6    11.87 )-----------( 158      ( 27 Jun 2021 02:00 )             27.9     06-27    136  |  102  |  11  ----------------------------<  211<H>  4.2   |  24  |  0.7    Ca    8.5      27 Jun 2021 02:00  Mg     1.9     06-27    TPro  5.1<L>  /  Alb  3.3<L>  /  TBili  0.7  /  DBili  x   /  AST  16  /  ALT  12  /  AlkPhos  59  06-27          Home Medications:  alfuzosin 10 mg oral tablet, extended release: 1 tab(s) orally once a day (21 Jun 2021 01:57)  atorvastatin 20 mg oral tablet: 1 tab(s) orally once a day (21 Jun 2021 01:57)  glipiZIDE 2.5 mg oral tablet, extended release: 1 tab(s) orally once a day (21 Jun 2021 01:57)  metFORMIN 500 mg oral tablet, extended release: 1 tab(s) orally 2 times a day (21 Jun 2021 01:57)    HOSPITAL MEDICATIONS:  MEDICATIONS  (STANDING):  aspirin enteric coated 325 milliGRAM(s) Oral daily  atorvastatin 20 milliGRAM(s) Oral at bedtime  bisacodyl 5 milliGRAM(s) Oral every 12 hours  chlorhexidine 4% Liquid 1 Application(s) Topical <User Schedule>  dextrose 40% Gel 15 Gram(s) Oral once  dextrose 5%. 1000 milliLiter(s) (50 mL/Hr) IV Continuous <Continuous>  dextrose 5%. 1000 milliLiter(s) (100 mL/Hr) IV Continuous <Continuous>  dextrose 50% Injectable 50 milliLiter(s) IV Push every 15 minutes  dextrose 50% Injectable 25 milliLiter(s) IV Push every 15 minutes  famotidine    Tablet 20 milliGRAM(s) Oral two times a day  glucagon  Injectable 1 milliGRAM(s) IntraMuscular once  heparin   Injectable 3000 Unit(s) SubCutaneous every 12 hours  insulin glargine Injectable (LANTUS) 20 Unit(s) SubCutaneous every morning  insulin lispro (ADMELOG) corrective regimen sliding scale   SubCutaneous three times a day before meals  ipratropium    for Nebulization 500 MICROGram(s) Nebulizer every 6 hours  metFORMIN 1000 milliGRAM(s) Oral two times a day with meals  metoprolol tartrate 12.5 milliGRAM(s) Oral every 12 hours  polyethylene glycol 3350 17 Gram(s) Oral daily  senna 2 Tablet(s) Oral at bedtime  sodium chloride 0.9%. 1000 milliLiter(s) (20 mL/Hr) IV Continuous <Continuous>    MEDICATIONS  (PRN):  ondansetron  IVPB 4 milliGRAM(s) IV Intermittent once PRN Nausea and/or Vomiting  oxyCODONE    IR 5 milliGRAM(s) Oral every 4 hours PRN Moderate Pain (4 - 6)  oxyCODONE    IR 10 milliGRAM(s) Oral every 4 hours PRN Severe Pain (7 - 10)      REVIEW OF SYSTEMS:  CONSTITUTIONAL: [X] all negative; [ ] weakness, [ ] fevers, [ ] chills  EYES/ENT: [X] all negative; [ ] visual changes, [ ] vertigo, [ ] throat pain   NECK: [X] all negative; [ ] pain, [ ] stiffness  RESPIRATORY: [] all negative, [ ] cough, [ ] wheezing, [ ] hemoptysis, [ ] shortness of breath  CARDIOVASCULAR: [] all negative; [ ] chest pain, [ ] palpitations, [ ] orthopnea  GASTROINTESTINAL: [X] all negative; [ ]abdominal pain, [ ] nausea, [ ] vomiting, [ ] hematemesis, [ ] diarrhea, [ ] constipation, [ ] melena, [ ] hematochezia.  GENITOURINARY: [X] all negative; [ ] dysuria, [ ] frequency, [ ] hematuria  NEUROLOGICAL: [X] all negative; [ ] numbness, [ ] weakness  SKIN: [X] all negative; [ ] itching, [ ] burning, [ ] rashes, [ ] lesions   All other review of systems is negative unless indicated above.    [  ] Unable to assess ROS because     PHYSICAL EXAM:          CONSTITUTIONAL: Well-developed; well-nourished; in no acute distress.   	SKIN: warm, dry  	HEAD: Normocephalic; atraumatic.  	EYES: PERRL, EOM, no conj injection, sclera clear  	ENT: No nasal discharge; airway clear.  	NECK: Supple; non tender.  No midline ttp ctls  	CARD: S1, S2 normal; no murmurs, gallops, or rubs. Regular rate and rhythm. 2+ RPs and DPs bilat, no carotid bruits, no pedal   edema, no calf pain b/l  	RESP: CTA  bilat good air movement No wheezes, rales or rhonchi.  	ABD: Soft, not tender, not distended, no CVA ttp no rebound or guarding, bowel sounds present  	EXT: Normal ROM.  No clubbing, cyanosis or edema.   	  	NEURO: Alert, awake, motor 5/5 R, 5/5 L        RADIOLOGY:  xray  < from: Xray Chest 1 View- PORTABLE-Routine (06.27.21 @ 06:41) >    Impression:    Low lung volumes with pulmonary vascular congestion and left basilar opacity/effusion, unchanged.        < end of copied text >    I spent 45 minutes of critical care time examining patient, reviewing vitals, labs, medications, imaging and discussing with the team goals of care to prevent life-threatening in this patient who is at high risk for deterioration or death due to:

## 2021-06-28 LAB
ALBUMIN SERPL ELPH-MCNC: 3.2 G/DL — LOW (ref 3.5–5.2)
ALP SERPL-CCNC: 56 U/L — SIGNIFICANT CHANGE UP (ref 30–115)
ALT FLD-CCNC: 10 U/L — SIGNIFICANT CHANGE UP (ref 0–41)
ANION GAP SERPL CALC-SCNC: 8 MMOL/L — SIGNIFICANT CHANGE UP (ref 7–14)
AST SERPL-CCNC: 13 U/L — SIGNIFICANT CHANGE UP (ref 0–41)
BASOPHILS # BLD AUTO: 0.03 K/UL — SIGNIFICANT CHANGE UP (ref 0–0.2)
BASOPHILS NFR BLD AUTO: 0.3 % — SIGNIFICANT CHANGE UP (ref 0–1)
BILIRUB SERPL-MCNC: 0.8 MG/DL — SIGNIFICANT CHANGE UP (ref 0.2–1.2)
BUN SERPL-MCNC: 12 MG/DL — SIGNIFICANT CHANGE UP (ref 10–20)
CALCIUM SERPL-MCNC: 8.4 MG/DL — LOW (ref 8.5–10.1)
CHLORIDE SERPL-SCNC: 98 MMOL/L — SIGNIFICANT CHANGE UP (ref 98–110)
CO2 SERPL-SCNC: 27 MMOL/L — SIGNIFICANT CHANGE UP (ref 17–32)
CREAT SERPL-MCNC: 0.6 MG/DL — LOW (ref 0.7–1.5)
EOSINOPHIL # BLD AUTO: 0.05 K/UL — SIGNIFICANT CHANGE UP (ref 0–0.7)
EOSINOPHIL NFR BLD AUTO: 0.6 % — SIGNIFICANT CHANGE UP (ref 0–8)
GLUCOSE BLDC GLUCOMTR-MCNC: 143 MG/DL — HIGH (ref 70–99)
GLUCOSE BLDC GLUCOMTR-MCNC: 147 MG/DL — HIGH (ref 70–99)
GLUCOSE BLDC GLUCOMTR-MCNC: 176 MG/DL — HIGH (ref 70–99)
GLUCOSE BLDC GLUCOMTR-MCNC: 191 MG/DL — HIGH (ref 70–99)
GLUCOSE SERPL-MCNC: 158 MG/DL — HIGH (ref 70–99)
HCT VFR BLD CALC: 23.8 % — LOW (ref 42–52)
HGB BLD-MCNC: 8.2 G/DL — LOW (ref 14–18)
IMM GRANULOCYTES NFR BLD AUTO: 0.8 % — HIGH (ref 0.1–0.3)
LYMPHOCYTES # BLD AUTO: 1.49 K/UL — SIGNIFICANT CHANGE UP (ref 1.2–3.4)
LYMPHOCYTES # BLD AUTO: 16.8 % — LOW (ref 20.5–51.1)
MAGNESIUM SERPL-MCNC: 1.9 MG/DL — SIGNIFICANT CHANGE UP (ref 1.8–2.4)
MCHC RBC-ENTMCNC: 29.4 PG — SIGNIFICANT CHANGE UP (ref 27–31)
MCHC RBC-ENTMCNC: 34.5 G/DL — SIGNIFICANT CHANGE UP (ref 32–37)
MCV RBC AUTO: 85.3 FL — SIGNIFICANT CHANGE UP (ref 80–94)
MONOCYTES # BLD AUTO: 0.84 K/UL — HIGH (ref 0.1–0.6)
MONOCYTES NFR BLD AUTO: 9.5 % — HIGH (ref 1.7–9.3)
NEUTROPHILS # BLD AUTO: 6.39 K/UL — SIGNIFICANT CHANGE UP (ref 1.4–6.5)
NEUTROPHILS NFR BLD AUTO: 72 % — SIGNIFICANT CHANGE UP (ref 42.2–75.2)
NRBC # BLD: 0 /100 WBCS — SIGNIFICANT CHANGE UP (ref 0–0)
PLATELET # BLD AUTO: 181 K/UL — SIGNIFICANT CHANGE UP (ref 130–400)
POTASSIUM SERPL-MCNC: 4.2 MMOL/L — SIGNIFICANT CHANGE UP (ref 3.5–5)
POTASSIUM SERPL-SCNC: 4.2 MMOL/L — SIGNIFICANT CHANGE UP (ref 3.5–5)
PROT SERPL-MCNC: 5 G/DL — LOW (ref 6–8)
RBC # BLD: 2.79 M/UL — LOW (ref 4.7–6.1)
RBC # FLD: 13.1 % — SIGNIFICANT CHANGE UP (ref 11.5–14.5)
SODIUM SERPL-SCNC: 133 MMOL/L — LOW (ref 135–146)
WBC # BLD: 8.87 K/UL — SIGNIFICANT CHANGE UP (ref 4.8–10.8)
WBC # FLD AUTO: 8.87 K/UL — SIGNIFICANT CHANGE UP (ref 4.8–10.8)

## 2021-06-28 PROCEDURE — 71045 X-RAY EXAM CHEST 1 VIEW: CPT | Mod: 26

## 2021-06-28 PROCEDURE — 93010 ELECTROCARDIOGRAM REPORT: CPT

## 2021-06-28 RX ORDER — POTASSIUM CHLORIDE 20 MEQ
20 PACKET (EA) ORAL
Refills: 0 | Status: COMPLETED | OUTPATIENT
Start: 2021-06-28 | End: 2021-06-28

## 2021-06-28 RX ORDER — MAGNESIUM SULFATE 500 MG/ML
2 VIAL (ML) INJECTION ONCE
Refills: 0 | Status: COMPLETED | OUTPATIENT
Start: 2021-06-28 | End: 2021-06-28

## 2021-06-28 RX ORDER — ACETAMINOPHEN 500 MG
650 TABLET ORAL EVERY 6 HOURS
Refills: 0 | Status: DISCONTINUED | OUTPATIENT
Start: 2021-06-28 | End: 2021-06-29

## 2021-06-28 RX ORDER — CLOPIDOGREL BISULFATE 75 MG/1
75 TABLET, FILM COATED ORAL DAILY
Refills: 0 | Status: DISCONTINUED | OUTPATIENT
Start: 2021-06-28 | End: 2021-06-29

## 2021-06-28 RX ORDER — ASPIRIN/CALCIUM CARB/MAGNESIUM 324 MG
81 TABLET ORAL DAILY
Refills: 0 | Status: DISCONTINUED | OUTPATIENT
Start: 2021-06-28 | End: 2021-06-29

## 2021-06-28 RX ORDER — FUROSEMIDE 40 MG
20 TABLET ORAL ONCE
Refills: 0 | Status: COMPLETED | OUTPATIENT
Start: 2021-06-28 | End: 2021-06-28

## 2021-06-28 RX ADMIN — ATORVASTATIN CALCIUM 20 MILLIGRAM(S): 80 TABLET, FILM COATED ORAL at 21:21

## 2021-06-28 RX ADMIN — Medication 20 MILLIEQUIVALENT(S): at 06:01

## 2021-06-28 RX ADMIN — Medication 1: at 11:57

## 2021-06-28 RX ADMIN — Medication 100 GRAM(S): at 08:35

## 2021-06-28 RX ADMIN — SENNA PLUS 2 TABLET(S): 8.6 TABLET ORAL at 21:22

## 2021-06-28 RX ADMIN — HEPARIN SODIUM 3000 UNIT(S): 5000 INJECTION INTRAVENOUS; SUBCUTANEOUS at 05:26

## 2021-06-28 RX ADMIN — METFORMIN HYDROCHLORIDE 1000 MILLIGRAM(S): 850 TABLET ORAL at 07:20

## 2021-06-28 RX ADMIN — INSULIN GLARGINE 20 UNIT(S): 100 INJECTION, SOLUTION SUBCUTANEOUS at 07:20

## 2021-06-28 RX ADMIN — Medication 12.5 MILLIGRAM(S): at 05:26

## 2021-06-28 RX ADMIN — Medication 81 MILLIGRAM(S): at 15:29

## 2021-06-28 RX ADMIN — FAMOTIDINE 20 MILLIGRAM(S): 10 INJECTION INTRAVENOUS at 05:26

## 2021-06-28 RX ADMIN — FAMOTIDINE 20 MILLIGRAM(S): 10 INJECTION INTRAVENOUS at 17:08

## 2021-06-28 RX ADMIN — POLYETHYLENE GLYCOL 3350 17 GRAM(S): 17 POWDER, FOR SOLUTION ORAL at 11:39

## 2021-06-28 RX ADMIN — METFORMIN HYDROCHLORIDE 1000 MILLIGRAM(S): 850 TABLET ORAL at 17:08

## 2021-06-28 RX ADMIN — Medication 500 MICROGRAM(S): at 20:11

## 2021-06-28 RX ADMIN — CLOPIDOGREL BISULFATE 75 MILLIGRAM(S): 75 TABLET, FILM COATED ORAL at 15:27

## 2021-06-28 RX ADMIN — Medication 500 MICROGRAM(S): at 07:57

## 2021-06-28 RX ADMIN — Medication 20 MILLIGRAM(S): at 05:53

## 2021-06-28 RX ADMIN — Medication 20 MILLIEQUIVALENT(S): at 07:21

## 2021-06-28 RX ADMIN — Medication 500 MICROGRAM(S): at 14:28

## 2021-06-28 RX ADMIN — HEPARIN SODIUM 3000 UNIT(S): 5000 INJECTION INTRAVENOUS; SUBCUTANEOUS at 17:08

## 2021-06-28 NOTE — PROGRESS NOTE ADULT - SUBJECTIVE AND OBJECTIVE BOX
OPERATIVE PROCEDURE(s):                POD #                       60yMale  SURGEON(s): BEBA Tovar  SUBJECTIVE ASSESSMENT:   Vital Signs Last 24 Hrs  T(F): 98.3 (28 Jun 2021 05:30), Max: 99.5 (27 Jun 2021 20:00)  HR: 103 (28 Jun 2021 08:00) (85 - 103)  BP: 100/56 (28 Jun 2021 06:00) (92/60 - 108/65)  BP(mean): 72 (28 Jun 2021 06:00) (69 - 84)  ABP: 114/60 (27 Jun 2021 11:00) (112/52 - 114/60)  ABP(mean): 78 (27 Jun 2021 11:00)  RR: 24 (28 Jun 2021 08:00) (20 - 32)  SpO2: 99% (28 Jun 2021 08:00) (94% - 100%)  CVP(mm Hg): --  CVP(cm H2O): --  CO: --  CI: --  PA: --  SVR: --    I&O's Detail    27 Jun 2021 07:01  -  28 Jun 2021 07:00  --------------------------------------------------------  IN:    Amiodarone: 33.4 mL    Oral Fluid: 940 mL  Total IN: 973.4 mL    OUT:    Indwelling Catheter - Urethral (mL): 1300 mL    Voided (mL): 825 mL  Total OUT: 2125 mL        Net:   I&O's Detail    26 Jun 2021 07:01  -  27 Jun 2021 07:00  --------------------------------------------------------  Total NET: -2291.7 mL      27 Jun 2021 07:01  -  28 Jun 2021 07:00  --------------------------------------------------------  Total NET: -1151.6 mL        CAPILLARY BLOOD GLUCOSE      POCT Blood Glucose.: 147 mg/dL (28 Jun 2021 06:15)  POCT Blood Glucose.: 171 mg/dL (27 Jun 2021 22:13)  POCT Blood Glucose.: 187 mg/dL (27 Jun 2021 16:49)  POCT Blood Glucose.: 214 mg/dL (27 Jun 2021 12:01)    Physical Exam:  General: NAD; A&Ox3/Patient is intubated and sedated  Cardiac: S1/S2, RRR, no murmur, no rubs  Lungs: unlabored respirations, CTA b/l, no wheeze, no rales, no crackles  Abdomen: Soft/NT/ND; positive bowel sounds x 4  Sternum: Intact, no click, incision healing well with no drainage  Incisions: Incisions clean/dry/intact  Extremities: No edema b/l lower extremities; good capillary refill; no cyanosis; palpable 1+ pedal pulses b/l    Central Venous Catheter: Yes[]  No[] , If Yes indication:           Day #  [place] Catheter: Yes  [] , No  [] , If yes indication:                      Day #  NGT: Yes [] No [] ,    If Yes Placement:                                     Day #  EPICARDIAL WIRES:  [] YES [] NO                                              Day #  BOWEL MOVEMENT:  [] YES [] NO, If No, Timing since last BM:      Day #  CHEST TUBE(Left/Right):  [] YES [] NO, If yes -  AIR LEAKS:  [] YES [] NO        LABS:                        8.2<L>  8.87  )-----------( 181      ( 28 Jun 2021 02:04 )             23.8<L>                        9.6<L>  11.87<H> )-----------( 158      ( 27 Jun 2021 02:00 )             27.9<L>    06-28    133<L>  |  98  |  12  ----------------------------<  158<H>  4.2   |  27  |  0.6<L>  06-27    136  |  102  |  11  ----------------------------<  211<H>  4.2   |  24  |  0.7    Ca    8.4<L>      28 Jun 2021 02:04  Mg     1.9     06-28    TPro  5.0<L> [6.0 - 8.0]  /  Alb  3.2<L> [3.5 - 5.2]  /  TBili  0.8 [0.2 - 1.2]  /  DBili  x   /  AST  13 [0 - 41]  /  ALT  10 [0 - 41]  /  AlkPhos  56 [30 - 115]  06-28        ABG - ( 27 Jun 2021 04:37 )  pH: 7.48  /  pCO2: 33    /  pO2: 87    / HCO3: 24    / Base Excess: 1.3   /  SaO2: 98    /  LA: 1.0              RADIOLOGY & ADDITIONAL TESTS:  CXR:  EKG:  MEDICATIONS  (STANDING):  aspirin enteric coated 325 milliGRAM(s) Oral daily  atorvastatin 20 milliGRAM(s) Oral at bedtime  chlorhexidine 4% Liquid 1 Application(s) Topical <User Schedule>  dextrose 40% Gel 15 Gram(s) Oral once  dextrose 5%. 1000 milliLiter(s) (50 mL/Hr) IV Continuous <Continuous>  dextrose 5%. 1000 milliLiter(s) (100 mL/Hr) IV Continuous <Continuous>  dextrose 50% Injectable 50 milliLiter(s) IV Push every 15 minutes  dextrose 50% Injectable 25 milliLiter(s) IV Push every 15 minutes  famotidine    Tablet 20 milliGRAM(s) Oral two times a day  glucagon  Injectable 1 milliGRAM(s) IntraMuscular once  heparin   Injectable 3000 Unit(s) SubCutaneous every 12 hours  insulin glargine Injectable (LANTUS) 20 Unit(s) SubCutaneous every morning  insulin lispro (ADMELOG) corrective regimen sliding scale   SubCutaneous three times a day before meals  ipratropium    for Nebulization 500 MICROGram(s) Nebulizer every 6 hours  metFORMIN 1000 milliGRAM(s) Oral two times a day with meals  metoprolol tartrate 12.5 milliGRAM(s) Oral every 12 hours  polyethylene glycol 3350 17 Gram(s) Oral daily  senna 2 Tablet(s) Oral at bedtime  sodium chloride 0.9%. 1000 milliLiter(s) (20 mL/Hr) IV Continuous <Continuous>    MEDICATIONS  (PRN):  ondansetron  IVPB 4 milliGRAM(s) IV Intermittent once PRN Nausea and/or Vomiting  oxyCODONE    IR 5 milliGRAM(s) Oral every 4 hours PRN Moderate Pain (4 - 6)  oxyCODONE    IR 10 milliGRAM(s) Oral every 4 hours PRN Severe Pain (7 - 10)    HEPARIN:  [] YES [] NO  Dose: XX UNITS/HR UNITS Q8H  LOVENOX:[] YES [] NO  Dose: XX mg Q24H  COUMADIN: []  YES [] NO  Dose: XX mg  Q24H  SCD's: YES b/l  GI Prophylaxis: Protonix [], Pepcid [], None [], (Contra-indication:.....)    Post-Op Beta-Blockers: Yes [], No[], If No, then contraindication:  Post-Op Aspirin: Yes [],  No [], If No, then contraindication:  Post-Op Statin: Yes [], No[], If No, then contraindication:  Allergies    No Known Allergies    Intolerances      Ambulation/Activity Status:    Assessment/Plan:  60y Male status-post .....  - Case and plan discussed with CTU Intensivist and CT Surgeon - Dr. Wilkins/Jesi/Guy  - Continue CTU supportive care    - Continue DVT/GI prophylaxis  - Incentive Spirometry 10 times an hour  - Continue to advance physical activity as tolerated and continue PT/OT as directed  1. CAD: Continue ASA, statin, BB  2. HTN:   3. A. Fib:   4. COPD/Hypoxia:   5. DM/Glucose Control:     Social Service Disposition:     OPERATIVE PROCEDURE(s):  CABGx4 w/radial               POD #4                        60yMale  SURGEON(s): BEBA Tovar  SUBJECTIVE ASSESSMENT: pt seen and examined. no acute complaints   Vital Signs Last 24 Hrs  T(F): 98.3 (28 Jun 2021 05:30), Max: 99.5 (27 Jun 2021 20:00)  HR: 103 (28 Jun 2021 08:00) (85 - 103)  BP: 100/56 (28 Jun 2021 06:00) (92/60 - 108/65)  BP(mean): 72 (28 Jun 2021 06:00) (69 - 84)  ABP: 114/60 (27 Jun 2021 11:00) (112/52 - 114/60)  ABP(mean): 78 (27 Jun 2021 11:00)  RR: 24 (28 Jun 2021 08:00) (20 - 32)  SpO2: 99% (28 Jun 2021 08:00) (94% - 100%)      I&O's Detail    27 Jun 2021 07:01  -  28 Jun 2021 07:00  --------------------------------------------------------  IN:    Amiodarone: 33.4 mL    Oral Fluid: 940 mL  Total IN: 973.4 mL    OUT:    Indwelling Catheter - Urethral (mL): 1300 mL    Voided (mL): 825 mL  Total OUT: 2125 mL        Net:   I&O's Detail    26 Jun 2021 07:01  -  27 Jun 2021 07:00  --------------------------------------------------------  Total NET: -2291.7 mL      27 Jun 2021 07:01  -  28 Jun 2021 07:00  --------------------------------------------------------  Total NET: -1151.6 mL        CAPILLARY BLOOD GLUCOSE      POCT Blood Glucose.: 147 mg/dL (28 Jun 2021 06:15)  POCT Blood Glucose.: 171 mg/dL (27 Jun 2021 22:13)  POCT Blood Glucose.: 187 mg/dL (27 Jun 2021 16:49)  POCT Blood Glucose.: 214 mg/dL (27 Jun 2021 12:01)    Physical Exam:  General: NAD; A&Ox3  Cardiac: S1/S2, RRR, no murmur, no rubs  Lungs: unlabored respirations, CTA b/l, no wheeze, no rales, no crackles  Abdomen: Soft/NT/ND; positive bowel sounds x 4  Sternum: Intact, no click, incision healing well with no drainage  Incisions: Incisions clean/dry/intact  Extremities: No edema b/l lower extremities; good capillary refill; no cyanosis; palpable 1+ pedal pulses b/l    Central Venous Catheter: Yes[x]  No[] , If Yes indication:       hd unstable             Day #4  [place] Catheter: Yes  [] , No  [x] , If yes indication:                          Day #  NGT: Yes [] No [x] ,    If Yes Placement:                                                   Day #  EPICARDIAL WIRES:  [x] YES [] NO                                                            Day #4  BOWEL MOVEMENT:  [] YES [x] NO, If No, Timing since last BM Day #  CHEST TUBE(Left/Right):  [] YES [x] NO, If yes -  AIR LEAKS:  [] YES [] NO        LABS:                        8.2<L>  8.87  )-----------( 181      ( 28 Jun 2021 02:04 )             23.8<L>                        9.6<L>  11.87<H> )-----------( 158      ( 27 Jun 2021 02:00 )             27.9<L>    06-28    133<L>  |  98  |  12  ----------------------------<  158<H>  4.2   |  27  |  0.6<L>  06-27    136  |  102  |  11  ----------------------------<  211<H>  4.2   |  24  |  0.7    Ca    8.4<L>      28 Jun 2021 02:04  Mg     1.9     06-28    TPro  5.0<L> [6.0 - 8.0]  /  Alb  3.2<L> [3.5 - 5.2]  /  TBili  0.8 [0.2 - 1.2]  /  DBili  x   /  AST  13 [0 - 41]  /  ALT  10 [0 - 41]  /  AlkPhos  56 [30 - 115]  06-28        ABG - ( 27 Jun 2021 04:37 )  pH: 7.48  /  pCO2: 33    /  pO2: 87    / HCO3: 24    / Base Excess: 1.3   /  SaO2: 98    /  LA: 1.0              RADIOLOGY & ADDITIONAL TESTS:  CXR: < from: Xray Chest 1 View- PORTABLE-Routine (Xray Chest 1 View- PORTABLE-Routine in AM.) (06.28.21 @ 06:33) >  Impression:    Stable basilar opacities most pronounced left lung base with blunted costophrenic angle. No pneumothorax.    < end of copied text >    EKG: < from: 12 Lead ECG (06.27.21 @ 07:35) >    Ventricular Rate 84 BPM    Atrial Rate 84 BPM    P-R Interval 152 ms    QRS Duration 96 ms    Q-T Interval 388 ms    QTC Calculation(Bazett) 458 ms    P Axis 37 degrees    R Axis -5 degrees    T Axis 0 degrees    Diagnosis Line Normal sinus rhythm  Inferior infarct , age undetermined  Abnormal ECG    < end of copied text >    MEDICATIONS  (STANDING):  aspirin enteric coated 325 milliGRAM(s) Oral daily  atorvastatin 20 milliGRAM(s) Oral at bedtime  chlorhexidine 4% Liquid 1 Application(s) Topical <User Schedule>  dextrose 40% Gel 15 Gram(s) Oral once  dextrose 5%. 1000 milliLiter(s) (50 mL/Hr) IV Continuous <Continuous>  dextrose 5%. 1000 milliLiter(s) (100 mL/Hr) IV Continuous <Continuous>  dextrose 50% Injectable 50 milliLiter(s) IV Push every 15 minutes  dextrose 50% Injectable 25 milliLiter(s) IV Push every 15 minutes  famotidine    Tablet 20 milliGRAM(s) Oral two times a day  glucagon  Injectable 1 milliGRAM(s) IntraMuscular once  heparin   Injectable 3000 Unit(s) SubCutaneous every 12 hours  insulin glargine Injectable (LANTUS) 20 Unit(s) SubCutaneous every morning  insulin lispro (ADMELOG) corrective regimen sliding scale   SubCutaneous three times a day before meals  ipratropium    for Nebulization 500 MICROGram(s) Nebulizer every 6 hours  metFORMIN 1000 milliGRAM(s) Oral two times a day with meals  metoprolol tartrate 12.5 milliGRAM(s) Oral every 12 hours  polyethylene glycol 3350 17 Gram(s) Oral daily  senna 2 Tablet(s) Oral at bedtime  sodium chloride 0.9%. 1000 milliLiter(s) (20 mL/Hr) IV Continuous <Continuous>    MEDICATIONS  (PRN):  ondansetron  IVPB 4 milliGRAM(s) IV Intermittent once PRN Nausea and/or Vomiting  oxyCODONE    IR 5 milliGRAM(s) Oral every 4 hours PRN Moderate Pain (4 - 6)  oxyCODONE    IR 10 milliGRAM(s) Oral every 4 hours PRN Severe Pain (7 - 10)    HEPARIN:  [x] YES [] NO  Dose: 3000 UNITS Q8H    SCD's: YES b/l  GI Prophylaxis: Protonix [], Pepcid [x], None [], (Contra-indication:.....)    Post-Op Beta-Blockers: Yes [x], No[], If No, then contraindication:  Post-Op Aspirin: Yes [x],  No [], If No, then contraindication:  Post-Op Statin: Yes [x], No[], If No, then contraindication:  Allergies    No Known Allergies    Intolerances      Ambulation/Activity Status: ambulate     Assessment/Plan:  60y Male status-post CABGx4 w/radia; POD#4  - Case and plan discussed with CTU Intensivist and CT Surgeon - Dr. Wilkins/Jesi/Guy  - Continue CTU supportive care and ongoing plan of care as per continuing CTU rounds.   - Continue DVT/GI prophylaxis  - Incentive Spirometry 10 times an hour  - Continue to advance physical activity as tolerated and continue PT/OT as directed  1. CAD: Continue ASA, statin, bb, hold norvasc for now as bp is borderline with lopressor  2. HTN: bb  3. A. Fib prophylaxis: monitor electrolytes, bb  4. COPD/Hypoxia: cont nebs, wean o2 as tolerated, encourage incentive spirometry, continue lasix for fluid overload  5. DM/Glucose Control: cont lantus 20, metformin and sliding scale    Social Service Disposition:   home

## 2021-06-29 ENCOUNTER — TRANSCRIPTION ENCOUNTER (OUTPATIENT)
Age: 61
End: 2021-06-29

## 2021-06-29 VITALS
HEART RATE: 89 BPM | OXYGEN SATURATION: 99 % | RESPIRATION RATE: 26 BRPM | DIASTOLIC BLOOD PRESSURE: 64 MMHG | SYSTOLIC BLOOD PRESSURE: 119 MMHG

## 2021-06-29 LAB
ALBUMIN SERPL ELPH-MCNC: 3.4 G/DL — LOW (ref 3.5–5.2)
ALP SERPL-CCNC: 60 U/L — SIGNIFICANT CHANGE UP (ref 30–115)
ALT FLD-CCNC: 11 U/L — SIGNIFICANT CHANGE UP (ref 0–41)
ANION GAP SERPL CALC-SCNC: 9 MMOL/L — SIGNIFICANT CHANGE UP (ref 7–14)
AST SERPL-CCNC: 13 U/L — SIGNIFICANT CHANGE UP (ref 0–41)
BASOPHILS # BLD AUTO: 0.02 K/UL — SIGNIFICANT CHANGE UP (ref 0–0.2)
BASOPHILS NFR BLD AUTO: 0.2 % — SIGNIFICANT CHANGE UP (ref 0–1)
BILIRUB SERPL-MCNC: 0.7 MG/DL — SIGNIFICANT CHANGE UP (ref 0.2–1.2)
BUN SERPL-MCNC: 13 MG/DL — SIGNIFICANT CHANGE UP (ref 10–20)
CALCIUM SERPL-MCNC: 8.8 MG/DL — SIGNIFICANT CHANGE UP (ref 8.5–10.1)
CHLORIDE SERPL-SCNC: 102 MMOL/L — SIGNIFICANT CHANGE UP (ref 98–110)
CO2 SERPL-SCNC: 26 MMOL/L — SIGNIFICANT CHANGE UP (ref 17–32)
CREAT SERPL-MCNC: 0.8 MG/DL — SIGNIFICANT CHANGE UP (ref 0.7–1.5)
EOSINOPHIL # BLD AUTO: 0.13 K/UL — SIGNIFICANT CHANGE UP (ref 0–0.7)
EOSINOPHIL NFR BLD AUTO: 1.4 % — SIGNIFICANT CHANGE UP (ref 0–8)
GLUCOSE BLDC GLUCOMTR-MCNC: 150 MG/DL — HIGH (ref 70–99)
GLUCOSE BLDC GLUCOMTR-MCNC: 184 MG/DL — HIGH (ref 70–99)
GLUCOSE SERPL-MCNC: 138 MG/DL — HIGH (ref 70–99)
HCT VFR BLD CALC: 28.5 % — LOW (ref 42–52)
HGB BLD-MCNC: 9.8 G/DL — LOW (ref 14–18)
IMM GRANULOCYTES NFR BLD AUTO: 0.9 % — HIGH (ref 0.1–0.3)
LYMPHOCYTES # BLD AUTO: 1.89 K/UL — SIGNIFICANT CHANGE UP (ref 1.2–3.4)
LYMPHOCYTES # BLD AUTO: 20.8 % — SIGNIFICANT CHANGE UP (ref 20.5–51.1)
MAGNESIUM SERPL-MCNC: 2.1 MG/DL — SIGNIFICANT CHANGE UP (ref 1.8–2.4)
MCHC RBC-ENTMCNC: 29.6 PG — SIGNIFICANT CHANGE UP (ref 27–31)
MCHC RBC-ENTMCNC: 34.4 G/DL — SIGNIFICANT CHANGE UP (ref 32–37)
MCV RBC AUTO: 86.1 FL — SIGNIFICANT CHANGE UP (ref 80–94)
MONOCYTES # BLD AUTO: 0.97 K/UL — HIGH (ref 0.1–0.6)
MONOCYTES NFR BLD AUTO: 10.7 % — HIGH (ref 1.7–9.3)
NEUTROPHILS # BLD AUTO: 6.01 K/UL — SIGNIFICANT CHANGE UP (ref 1.4–6.5)
NEUTROPHILS NFR BLD AUTO: 66 % — SIGNIFICANT CHANGE UP (ref 42.2–75.2)
NRBC # BLD: 0 /100 WBCS — SIGNIFICANT CHANGE UP (ref 0–0)
PLATELET # BLD AUTO: 242 K/UL — SIGNIFICANT CHANGE UP (ref 130–400)
POTASSIUM SERPL-MCNC: 4.3 MMOL/L — SIGNIFICANT CHANGE UP (ref 3.5–5)
POTASSIUM SERPL-SCNC: 4.3 MMOL/L — SIGNIFICANT CHANGE UP (ref 3.5–5)
PROT SERPL-MCNC: 5.4 G/DL — LOW (ref 6–8)
RBC # BLD: 3.31 M/UL — LOW (ref 4.7–6.1)
RBC # FLD: 13 % — SIGNIFICANT CHANGE UP (ref 11.5–14.5)
SODIUM SERPL-SCNC: 137 MMOL/L — SIGNIFICANT CHANGE UP (ref 135–146)
WBC # BLD: 9.1 K/UL — SIGNIFICANT CHANGE UP (ref 4.8–10.8)
WBC # FLD AUTO: 9.1 K/UL — SIGNIFICANT CHANGE UP (ref 4.8–10.8)

## 2021-06-29 PROCEDURE — 71045 X-RAY EXAM CHEST 1 VIEW: CPT | Mod: 26,59

## 2021-06-29 PROCEDURE — 71046 X-RAY EXAM CHEST 2 VIEWS: CPT | Mod: 26

## 2021-06-29 PROCEDURE — 93010 ELECTROCARDIOGRAM REPORT: CPT

## 2021-06-29 RX ORDER — CLOPIDOGREL BISULFATE 75 MG/1
1 TABLET, FILM COATED ORAL
Qty: 30 | Refills: 0
Start: 2021-06-29

## 2021-06-29 RX ORDER — POTASSIUM CHLORIDE 20 MEQ
1 PACKET (EA) ORAL
Qty: 3 | Refills: 0
Start: 2021-06-29 | End: 2021-07-04

## 2021-06-29 RX ORDER — ASPIRIN/CALCIUM CARB/MAGNESIUM 324 MG
1 TABLET ORAL
Qty: 30 | Refills: 0
Start: 2021-06-29

## 2021-06-29 RX ORDER — FAMOTIDINE 10 MG/ML
1 INJECTION INTRAVENOUS
Qty: 60 | Refills: 0
Start: 2021-06-29

## 2021-06-29 RX ORDER — ENOXAPARIN SODIUM 100 MG/ML
20 INJECTION SUBCUTANEOUS
Qty: 5 | Refills: 0
Start: 2021-06-29 | End: 2021-07-28

## 2021-06-29 RX ORDER — FUROSEMIDE 40 MG
1 TABLET ORAL
Qty: 3 | Refills: 0
Start: 2021-06-29 | End: 2021-07-04

## 2021-06-29 RX ORDER — ATORVASTATIN CALCIUM 80 MG/1
1 TABLET, FILM COATED ORAL
Qty: 30 | Refills: 0
Start: 2021-06-29

## 2021-06-29 RX ORDER — ATORVASTATIN CALCIUM 80 MG/1
1 TABLET, FILM COATED ORAL
Qty: 0 | Refills: 0 | DISCHARGE

## 2021-06-29 RX ORDER — METOPROLOL TARTRATE 50 MG
0.5 TABLET ORAL
Qty: 30 | Refills: 0
Start: 2021-06-29 | End: 2021-07-28

## 2021-06-29 RX ORDER — ALFUZOSIN HYDROCHLORIDE 10 MG/1
1 TABLET, EXTENDED RELEASE ORAL
Qty: 0 | Refills: 0 | DISCHARGE

## 2021-06-29 RX ORDER — METFORMIN HYDROCHLORIDE 850 MG/1
1 TABLET ORAL
Qty: 60 | Refills: 0
Start: 2021-06-29

## 2021-06-29 RX ORDER — METFORMIN HYDROCHLORIDE 850 MG/1
1 TABLET ORAL
Qty: 0 | Refills: 0 | DISCHARGE

## 2021-06-29 RX ADMIN — METFORMIN HYDROCHLORIDE 1000 MILLIGRAM(S): 850 TABLET ORAL at 08:12

## 2021-06-29 RX ADMIN — CHLORHEXIDINE GLUCONATE 1 APPLICATION(S): 213 SOLUTION TOPICAL at 05:13

## 2021-06-29 RX ADMIN — FAMOTIDINE 20 MILLIGRAM(S): 10 INJECTION INTRAVENOUS at 05:11

## 2021-06-29 RX ADMIN — Medication 500 MICROGRAM(S): at 08:03

## 2021-06-29 RX ADMIN — Medication 1: at 11:08

## 2021-06-29 RX ADMIN — CLOPIDOGREL BISULFATE 75 MILLIGRAM(S): 75 TABLET, FILM COATED ORAL at 11:10

## 2021-06-29 RX ADMIN — INSULIN GLARGINE 20 UNIT(S): 100 INJECTION, SOLUTION SUBCUTANEOUS at 08:21

## 2021-06-29 RX ADMIN — HEPARIN SODIUM 3000 UNIT(S): 5000 INJECTION INTRAVENOUS; SUBCUTANEOUS at 05:12

## 2021-06-29 RX ADMIN — Medication 81 MILLIGRAM(S): at 11:10

## 2021-06-29 RX ADMIN — Medication 12.5 MILLIGRAM(S): at 05:11

## 2021-06-29 NOTE — PROGRESS NOTE ADULT - SUBJECTIVE AND OBJECTIVE BOX
OPERATIVE PROCEDURE(s): cabg x 4 with radial                 POD # 5    SURGEON(s): michaelle    SUBJECTIVE ASSESSMENT: no complaints and is eager to go home    Vital Signs Last 24 Hrs  T(C): 36.7 (29 Jun 2021 08:00), Max: 37.2 (28 Jun 2021 16:00)  T(F): 98 (29 Jun 2021 08:00), Max: 98.9 (28 Jun 2021 16:00)  HR: 85 (29 Jun 2021 10:00) (78 - 96)  BP: 124/58 (29 Jun 2021 10:00) (93/51 - 131/76)  BP(mean): 81 (29 Jun 2021 10:00) (65 - 99)  RR: 24 (29 Jun 2021 10:00) (20 - 44)  SpO2: 97% (29 Jun 2021 10:00) (97% - 100%)  06-28-21 @ 07:01  -  06-29-21 @ 07:00  --------------------------------------------------------  IN: 920 mL / OUT: 2850 mL / NET: -1930 mL    06-29-21 @ 07:01  -  06-29-21 @ 10:29  --------------------------------------------------------  IN: 150 mL / OUT: 0 mL / NET: 150 mL        Physical Exam  General: alert and oriented x 3  Chest: cta bl  CVS: s1s2  Abd:  pos bs soft, nt  GI/ :  Ext: no sig edema  incisions-c/d/i  sternum- intact    Central Venous Catheter: Yes[ ]  No[x ] , If Yes indication:           Day #    Gilliland Catheter: Yes  [ ] , No [x ] : If yes indication:                         Day #    NGT: Yes [ ] No [ x ]     If Yes Placement:                                          Day #    Post-Op Beta-Blockers: Yes [x ], No[ ], If No, then contraindication:    CHEST TUBE:  [ ] YES [x ] NO  OUTPUT:     per 24 hours    AIR LEAKS:  [ ] YES [ ] NO      EPICARDIAL WIRES:  [ ] YES [ x] NO      BOWEL MOVEMENT:  [ ] YES [ x] NO      LABS:                        9.8    9.10  )-----------( 242      ( 29 Jun 2021 02:31 )             28.5     COUMADIN:   [ ] YES [ x] NO      06-29    137  |  102  |  13  ----------------------------<  138<H>  4.3   |  26  |  0.8    Ca    8.8      29 Jun 2021 02:31  Mg     2.1     06-29    TPro  5.4<L>  /  Alb  3.4<L>  /  TBili  0.7  /  DBili  x   /  AST  13  /  ALT  11  /  AlkPhos  60  06-29        MEDICATIONS  (STANDING):  aspirin enteric coated 81 milliGRAM(s) Oral daily  atorvastatin 20 milliGRAM(s) Oral at bedtime  chlorhexidine 4% Liquid 1 Application(s) Topical <User Schedule>  clopidogrel Tablet 75 milliGRAM(s) Oral daily  dextrose 40% Gel 15 Gram(s) Oral once  dextrose 5%. 1000 milliLiter(s) (50 mL/Hr) IV Continuous <Continuous>  dextrose 5%. 1000 milliLiter(s) (100 mL/Hr) IV Continuous <Continuous>  dextrose 50% Injectable 50 milliLiter(s) IV Push every 15 minutes  dextrose 50% Injectable 25 milliLiter(s) IV Push every 15 minutes  famotidine    Tablet 20 milliGRAM(s) Oral two times a day  glucagon  Injectable 1 milliGRAM(s) IntraMuscular once  heparin   Injectable 3000 Unit(s) SubCutaneous every 12 hours  insulin glargine Injectable (LANTUS) 20 Unit(s) SubCutaneous every morning  insulin lispro (ADMELOG) corrective regimen sliding scale   SubCutaneous three times a day before meals  ipratropium    for Nebulization 500 MICROGram(s) Nebulizer every 6 hours  metFORMIN 1000 milliGRAM(s) Oral two times a day with meals  metoprolol tartrate 12.5 milliGRAM(s) Oral every 12 hours  polyethylene glycol 3350 17 Gram(s) Oral daily  senna 2 Tablet(s) Oral at bedtime    MEDICATIONS  (PRN):  acetaminophen   Tablet .. 650 milliGRAM(s) Oral every 6 hours PRN Mild Pain (1 - 3)  ondansetron  IVPB 4 milliGRAM(s) IV Intermittent once PRN Nausea and/or Vomiting  oxyCODONE    IR 5 milliGRAM(s) Oral every 4 hours PRN Moderate Pain (4 - 6)  oxyCODONE    IR 10 milliGRAM(s) Oral every 4 hours PRN Severe Pain (7 - 10)      Allergies    No Known Allergies    Intolerances        Ambulation/Activity Status:    amb well with pt    RADIOLOGY & ADDITIONAL TESTS:    PA and Lateral done- report pending    EXAM:  XR CHEST PORTABLE ROUTINE 1V            PROCEDURE DATE:  06/29/2021      INTERPRETATION:  Clinical History / Reason for exam: Shortness of breath.    Comparison : Chest radiograph June 28, 2021.    Technique/Positioning: Single frontalchest x-ray obtained.    Findings:    Support devices: None.    Cardiac/mediastinum/hilum: Unchanged.    Lung parenchyma/Pleura: No significant change in bilateral opacities. No pneumothorax.    Skeleton/soft tissues: Unchanged.    Impression:    No significant change in bilateral opacities.      Ventricular Rate 81 BPM    Atrial Rate 81 BPM    P-R Interval 142 ms    QRS Duration 94 ms    Q-T Interval 418 ms    QTC Calculation(Bazett) 485 ms    P Axis 51 degrees    R Axis -5 degrees    T Axis -3 degrees    Diagnosis Line Normal sinus rhythm  Possible Left atrial enlargement  Inferior infarct , age undetermined  Abnormal ECG    Confirmed by SYDNEE TIRADO MD ([id number]) on 6/29/2021 9:27:12 AM    Assessment/Plan: Patient is a 59 yo male s/p cabg with radial pod # 5  cont present tx as per ct surgeon  s/p cabg- cont asa, lopressor, and statin  s/p radial artery harvest - no norvasc at the present time due to borderline bp  dvt/gi ppx  dm- cont glucophage and lantus insulin; pt is agreeable and admits that he has been non-compliant with suggested therapy in the past; pt will receive insulin teaching prior to d/c and will f/u with pmd and/or endocrinologist for alternative treatment options for dm      Social Service Disposition:  d/c home today with vns   OPERATIVE PROCEDURE(s): cabg x 4 with radial                 POD # 5    SURGEON(s): michaelle    SUBJECTIVE ASSESSMENT: no complaints and is eager to go home    Vital Signs Last 24 Hrs  T(C): 36.7 (29 Jun 2021 08:00), Max: 37.2 (28 Jun 2021 16:00)  T(F): 98 (29 Jun 2021 08:00), Max: 98.9 (28 Jun 2021 16:00)  HR: 85 (29 Jun 2021 10:00) (78 - 96)  BP: 124/58 (29 Jun 2021 10:00) (93/51 - 131/76)  BP(mean): 81 (29 Jun 2021 10:00) (65 - 99)  RR: 24 (29 Jun 2021 10:00) (20 - 44)  SpO2: 97% (29 Jun 2021 10:00) (97% - 100%)  06-28-21 @ 07:01  -  06-29-21 @ 07:00  --------------------------------------------------------  IN: 920 mL / OUT: 2850 mL / NET: -1930 mL    06-29-21 @ 07:01  -  06-29-21 @ 10:29  --------------------------------------------------------  IN: 150 mL / OUT: 0 mL / NET: 150 mL        Physical Exam  General: alert and oriented x 3  Chest: cta bl  CVS: s1s2  Abd:  pos bs soft, nt  GI/ :  Ext: no sig edema  incisions-c/d/i  sternum- intact    Central Venous Catheter: Yes[ ]  No[x ] , If Yes indication:           Day #    Gilliland Catheter: Yes  [ ] , No [x ] : If yes indication:                         Day #    NGT: Yes [ ] No [ x ]     If Yes Placement:                                          Day #    Post-Op Beta-Blockers: Yes [x ], No[ ], If No, then contraindication:    CHEST TUBE:  [ ] YES [x ] NO  OUTPUT:     per 24 hours    AIR LEAKS:  [ ] YES [ ] NO      EPICARDIAL WIRES:  [ ] YES [ x] NO      BOWEL MOVEMENT:  [ ] YES [ x] NO      LABS:                        9.8    9.10  )-----------( 242      ( 29 Jun 2021 02:31 )             28.5     COUMADIN:   [ ] YES [ x] NO      06-29    137  |  102  |  13  ----------------------------<  138<H>  4.3   |  26  |  0.8    Ca    8.8      29 Jun 2021 02:31  Mg     2.1     06-29    TPro  5.4<L>  /  Alb  3.4<L>  /  TBili  0.7  /  DBili  x   /  AST  13  /  ALT  11  /  AlkPhos  60  06-29        MEDICATIONS  (STANDING):  aspirin enteric coated 81 milliGRAM(s) Oral daily  atorvastatin 20 milliGRAM(s) Oral at bedtime  chlorhexidine 4% Liquid 1 Application(s) Topical <User Schedule>  clopidogrel Tablet 75 milliGRAM(s) Oral daily  dextrose 40% Gel 15 Gram(s) Oral once  dextrose 5%. 1000 milliLiter(s) (50 mL/Hr) IV Continuous <Continuous>  dextrose 5%. 1000 milliLiter(s) (100 mL/Hr) IV Continuous <Continuous>  dextrose 50% Injectable 50 milliLiter(s) IV Push every 15 minutes  dextrose 50% Injectable 25 milliLiter(s) IV Push every 15 minutes  famotidine    Tablet 20 milliGRAM(s) Oral two times a day  glucagon  Injectable 1 milliGRAM(s) IntraMuscular once  heparin   Injectable 3000 Unit(s) SubCutaneous every 12 hours  insulin glargine Injectable (LANTUS) 20 Unit(s) SubCutaneous every morning  insulin lispro (ADMELOG) corrective regimen sliding scale   SubCutaneous three times a day before meals  ipratropium    for Nebulization 500 MICROGram(s) Nebulizer every 6 hours  metFORMIN 1000 milliGRAM(s) Oral two times a day with meals  metoprolol tartrate 12.5 milliGRAM(s) Oral every 12 hours  polyethylene glycol 3350 17 Gram(s) Oral daily  senna 2 Tablet(s) Oral at bedtime    MEDICATIONS  (PRN):  acetaminophen   Tablet .. 650 milliGRAM(s) Oral every 6 hours PRN Mild Pain (1 - 3)  ondansetron  IVPB 4 milliGRAM(s) IV Intermittent once PRN Nausea and/or Vomiting  oxyCODONE    IR 5 milliGRAM(s) Oral every 4 hours PRN Moderate Pain (4 - 6)  oxyCODONE    IR 10 milliGRAM(s) Oral every 4 hours PRN Severe Pain (7 - 10)      Allergies    No Known Allergies    Intolerances        Ambulation/Activity Status:    amb well with pt    RADIOLOGY & ADDITIONAL TESTS:    PA and Lateral done- report pending    EXAM:  XR CHEST PORTABLE ROUTINE 1V            PROCEDURE DATE:  06/29/2021      INTERPRETATION:  Clinical History / Reason for exam: Shortness of breath.    Comparison : Chest radiograph June 28, 2021.    Technique/Positioning: Single frontalchest x-ray obtained.    Findings:    Support devices: None.    Cardiac/mediastinum/hilum: Unchanged.    Lung parenchyma/Pleura: No significant change in bilateral opacities. No pneumothorax.    Skeleton/soft tissues: Unchanged.    Impression:    No significant change in bilateral opacities.      Ventricular Rate 81 BPM    Atrial Rate 81 BPM    P-R Interval 142 ms    QRS Duration 94 ms    Q-T Interval 418 ms    QTC Calculation(Bazett) 485 ms    P Axis 51 degrees    R Axis -5 degrees    T Axis -3 degrees    Diagnosis Line Normal sinus rhythm  Possible Left atrial enlargement  Inferior infarct , age undetermined  Abnormal ECG    Confirmed by SYDNEE TIRADO MD ([id number]) on 6/29/2021 9:27:12 AM    Assessment/Plan: Patient is a 59 yo male s/p cabg with radial pod # 5  cont present tx as per ct surgeon  s/p cabg- cont asa, lopressor, and statin  s/p radial artery harvest - no norvasc at the present time due to borderline bp  dvt/gi ppx  dm- cont glucophage and lantus insulin; pt is agreeable and admits that he has been non-compliant with suggested therapy in the past; pt will receive insulin teaching prior to d/c and will f/u with pmd and/or endocrinologist for alternative treatment options for dm  s/p nstemi- no acei/arb at the present time being the patient has borderline bp    Social Service Disposition:  d/c home today with vns

## 2021-06-29 NOTE — PROGRESS NOTE ADULT - REASON FOR ADMISSION
chest pain

## 2021-06-29 NOTE — PROGRESS NOTE ADULT - NSICDXPILOT_GEN_ALL_CORE
[place]

## 2021-06-29 NOTE — PROGRESS NOTE ADULT - PROVIDER SPECIALTY LIST ADULT
CT Surgery
Cardiology
Cardiology
Critical Care
Critical Care
Internal Medicine
CT Surgery
Critical Care
Cardiology
Cardiology

## 2021-06-29 NOTE — DISCHARGE NOTE NURSING/CASE MANAGEMENT/SOCIAL WORK - PATIENT PORTAL LINK FT
You can access the FollowMyHealth Patient Portal offered by [facility] by registering at the following website: [email or web address]. By joining [facility]’s FollowMyHealth portal, you will also be able to view your health information using other applications (apps) compatible with our system.

## 2021-06-30 ENCOUNTER — NON-APPOINTMENT (OUTPATIENT)
Age: 61
End: 2021-06-30

## 2021-07-01 ENCOUNTER — NON-APPOINTMENT (OUTPATIENT)
Age: 61
End: 2021-07-01

## 2021-07-01 LAB — GLUCOSE BLDC GLUCOMTR-MCNC: 171 MG/DL — HIGH (ref 70–99)

## 2021-07-02 ENCOUNTER — APPOINTMENT (OUTPATIENT)
Dept: CARDIOTHORACIC SURGERY | Facility: CLINIC | Age: 61
End: 2021-07-02
Payer: COMMERCIAL

## 2021-07-02 VITALS
DIASTOLIC BLOOD PRESSURE: 67 MMHG | HEART RATE: 94 BPM | SYSTOLIC BLOOD PRESSURE: 100 MMHG | WEIGHT: 220 LBS | TEMPERATURE: 98.3 F | HEIGHT: 71 IN | BODY MASS INDEX: 30.8 KG/M2 | OXYGEN SATURATION: 96 % | RESPIRATION RATE: 12 BRPM

## 2021-07-02 PROCEDURE — 99024 POSTOP FOLLOW-UP VISIT: CPT

## 2021-07-06 ENCOUNTER — APPOINTMENT (OUTPATIENT)
Dept: CARE COORDINATION | Facility: HOME HEALTH | Age: 61
End: 2021-07-06
Payer: COMMERCIAL

## 2021-07-06 VITALS
RESPIRATION RATE: 12 BRPM | SYSTOLIC BLOOD PRESSURE: 112 MMHG | DIASTOLIC BLOOD PRESSURE: 60 MMHG | HEART RATE: 80 BPM | OXYGEN SATURATION: 97 %

## 2021-07-06 PROCEDURE — 99024 POSTOP FOLLOW-UP VISIT: CPT

## 2021-07-12 ENCOUNTER — APPOINTMENT (OUTPATIENT)
Dept: CARDIOTHORACIC SURGERY | Facility: CLINIC | Age: 61
End: 2021-07-12
Payer: COMMERCIAL

## 2021-07-12 ENCOUNTER — APPOINTMENT (OUTPATIENT)
Dept: CARDIOLOGY | Facility: CLINIC | Age: 61
End: 2021-07-12
Payer: COMMERCIAL

## 2021-07-12 VITALS
TEMPERATURE: 98.2 F | OXYGEN SATURATION: 98 % | HEIGHT: 71 IN | DIASTOLIC BLOOD PRESSURE: 72 MMHG | SYSTOLIC BLOOD PRESSURE: 106 MMHG | WEIGHT: 220 LBS | HEART RATE: 90 BPM | RESPIRATION RATE: 14 BRPM | BODY MASS INDEX: 30.8 KG/M2

## 2021-07-12 VITALS
HEIGHT: 71 IN | BODY MASS INDEX: 30.68 KG/M2 | HEART RATE: 83 BPM | TEMPERATURE: 97.4 F | DIASTOLIC BLOOD PRESSURE: 60 MMHG | SYSTOLIC BLOOD PRESSURE: 180 MMHG

## 2021-07-12 DIAGNOSIS — E87.6 HYPOKALEMIA: ICD-10-CM

## 2021-07-12 DIAGNOSIS — Z00.6 ENCOUNTER FOR EXAMINATION FOR NORMAL COMPARISON AND CONTROL IN CLINICAL RESEARCH PROGRAM: ICD-10-CM

## 2021-07-12 DIAGNOSIS — E87.70 FLUID OVERLOAD, UNSPECIFIED: ICD-10-CM

## 2021-07-12 DIAGNOSIS — Z87.891 PERSONAL HISTORY OF NICOTINE DEPENDENCE: ICD-10-CM

## 2021-07-12 DIAGNOSIS — Z91.19 PATIENT'S NONCOMPLIANCE WITH OTHER MEDICAL TREATMENT AND REGIMEN: ICD-10-CM

## 2021-07-12 DIAGNOSIS — E11.65 TYPE 2 DIABETES MELLITUS WITH HYPERGLYCEMIA: ICD-10-CM

## 2021-07-12 DIAGNOSIS — N40.0 BENIGN PROSTATIC HYPERPLASIA WITHOUT LOWER URINARY TRACT SYMPTOMS: ICD-10-CM

## 2021-07-12 DIAGNOSIS — I25.84 CORONARY ATHEROSCLEROSIS DUE TO CALCIFIED CORONARY LESION: ICD-10-CM

## 2021-07-12 DIAGNOSIS — E11.40 TYPE 2 DIABETES MELLITUS WITH DIABETIC NEUROPATHY, UNSPECIFIED: ICD-10-CM

## 2021-07-12 DIAGNOSIS — I95.9 HYPOTENSION, UNSPECIFIED: ICD-10-CM

## 2021-07-12 DIAGNOSIS — D62 ACUTE POSTHEMORRHAGIC ANEMIA: ICD-10-CM

## 2021-07-12 DIAGNOSIS — E66.9 OBESITY, UNSPECIFIED: ICD-10-CM

## 2021-07-12 DIAGNOSIS — E78.5 HYPERLIPIDEMIA, UNSPECIFIED: ICD-10-CM

## 2021-07-12 DIAGNOSIS — I21.4 NON-ST ELEVATION (NSTEMI) MYOCARDIAL INFARCTION: ICD-10-CM

## 2021-07-12 DIAGNOSIS — Z79.84 LONG TERM (CURRENT) USE OF ORAL HYPOGLYCEMIC DRUGS: ICD-10-CM

## 2021-07-12 DIAGNOSIS — G51.0 BELL'S PALSY: ICD-10-CM

## 2021-07-12 DIAGNOSIS — I25.110 ATHEROSCLEROTIC HEART DISEASE OF NATIVE CORONARY ARTERY WITH UNSTABLE ANGINA PECTORIS: ICD-10-CM

## 2021-07-12 DIAGNOSIS — I10 ESSENTIAL (PRIMARY) HYPERTENSION: ICD-10-CM

## 2021-07-12 PROCEDURE — 93000 ELECTROCARDIOGRAM COMPLETE: CPT

## 2021-07-12 PROCEDURE — 99072 ADDL SUPL MATRL&STAF TM PHE: CPT

## 2021-07-12 PROCEDURE — 99214 OFFICE O/P EST MOD 30 MIN: CPT

## 2021-07-12 PROCEDURE — 99024 POSTOP FOLLOW-UP VISIT: CPT

## 2021-07-12 RX ORDER — OXYCODONE AND ACETAMINOPHEN 5; 325 MG/1; MG/1
5-325 TABLET ORAL
Refills: 0 | Status: DISCONTINUED | COMMUNITY
Start: 2021-06-30 | End: 2021-07-12

## 2021-07-12 NOTE — PHYSICAL EXAM
[Well Developed] : well developed [Well Nourished] : well nourished [No Acute Distress] : no acute distress [Obese] : obese [Normal Conjunctiva] : normal conjunctiva [No Carotid Bruit] : no carotid bruit [Normal] : alert and oriented, normal memory [de-identified] : NC/AT [de-identified] : no JVD

## 2021-07-12 NOTE — HISTORY OF PRESENT ILLNESS
[FreeTextEntry1] : 60-year-old gentleman with past history significant for hypertension, hypercholesterolemia and very poorly controlled non-insulin-dependent diabetes with his most recent hemoglobin A1c of 12, who was admitted through the emergency room with a non-ST elevation myocardial infarction, cath revealed multivessel disease and he underwent uncomplicated coronary artery bypass grafting x 4 grafts on 6/24/2021. \par He also had symptoms suggestive of a peripheral neuropathy in her legs and he has possible diabetic eye involvement and has a previous Bell's palsy on the right side.\par \par He is known to be overweight weighing in at about 245 pounds and he has a family history of heart disease and the patient himself is an ex-smoker with a 25-pack-year history of smoking.\par \par Recovering post-op.\par

## 2021-07-12 NOTE — ASSESSMENT
[FreeTextEntry1] : 59 y/o male with poorly controlled DM, HTN, DL, CAD, s/p CABG.\par \par Hospital records reviewed.\par Echo, cath surgery report reviewed.\par CT surgery follow-up appreciated.\par \par Secondary prevention discussed.\par DM control discussed.\par C/w medical therapy\par C/w antiplatelet therapy\par C/w statin\par F/u Labs\par gradual increase in exertion\par Close f/u with CTS\par F/u in 3 months.

## 2021-07-15 LAB
ALBUMIN SERPL ELPH-MCNC: 4.5 G/DL
ALP BLD-CCNC: 89 U/L
ALT SERPL-CCNC: 7 U/L
ANION GAP SERPL CALC-SCNC: 16 MMOL/L
AST SERPL-CCNC: 11 U/L
BASOPHILS # BLD AUTO: 0.07 K/UL
BASOPHILS NFR BLD AUTO: 0.8 %
BILIRUB SERPL-MCNC: 0.6 MG/DL
BUN SERPL-MCNC: 16 MG/DL
CALCIUM SERPL-MCNC: 10.3 MG/DL
CHLORIDE SERPL-SCNC: 98 MMOL/L
CO2 SERPL-SCNC: 23 MMOL/L
CREAT SERPL-MCNC: 1 MG/DL
EOSINOPHIL # BLD AUTO: 0.14 K/UL
EOSINOPHIL NFR BLD AUTO: 1.6 %
ESTIMATED AVERAGE GLUCOSE: 217 MG/DL
GLUCOSE SERPL-MCNC: 109 MG/DL
HBA1C MFR BLD HPLC: 9.2 %
HCT VFR BLD CALC: 38.3 %
HGB BLD-MCNC: 12.3 G/DL
IMM GRANULOCYTES NFR BLD AUTO: 0.4 %
LYMPHOCYTES # BLD AUTO: 1.36 K/UL
LYMPHOCYTES NFR BLD AUTO: 15.2 %
MAN DIFF?: NORMAL
MCHC RBC-ENTMCNC: 28.8 PG
MCHC RBC-ENTMCNC: 32.1 G/DL
MCV RBC AUTO: 89.7 FL
MONOCYTES # BLD AUTO: 0.7 K/UL
MONOCYTES NFR BLD AUTO: 7.8 %
NEUTROPHILS # BLD AUTO: 6.65 K/UL
NEUTROPHILS NFR BLD AUTO: 74.2 %
PLATELET # BLD AUTO: 515 K/UL
POTASSIUM SERPL-SCNC: 4.7 MMOL/L
PROT SERPL-MCNC: 7.2 G/DL
RBC # BLD: 4.27 M/UL
RBC # FLD: 12.9 %
SODIUM SERPL-SCNC: 137 MMOL/L
WBC # FLD AUTO: 8.96 K/UL

## 2021-07-15 NOTE — PHYSICAL EXAM
[No Edema] : no edema [Site: ___] : Site: [unfilled] [Clean] : clean [Dry] : dry [Healing Well] : healing well [Respiration, Rhythm And Depth] : normal respiratory rhythm and effort [Auscultation Breath Sounds / Voice Sounds] : lungs were clear to auscultation bilaterally [Heart Rate And Rhythm] : heart rate was normal and rhythm regular [2+] : left 2+ [Abdomen Soft] : soft [Abdomen Tenderness] : non-tender [Abnormal Walk] : normal gait [Oriented To Time, Place, And Person] : oriented to person, place, and time

## 2021-07-15 NOTE — HISTORY OF PRESENT ILLNESS
[FreeTextEntry1] : FOLLOW YOUR HEART\par \par Hospital Course	 \par 60y Male with PMH of BPH, DLD and DM presented to ED with Chest pain 48 hours \par ago. He ruled in for NSTEMI. Cardiac cath  revealed triple vessel disease. Very \par poorly controlled diabetes with a HbA1c = 12.  Known CAD.  On 06/24/21, he \par underwent surgical myocardial revascularization. Intra op on CAMILO, starting EF \par was 40% and post op it was significantly improved. Post-operatively, the \par patient had an uneventful hospital course and was treated with insulin for \par optimal glycemic control.  He was then discharged home in stable condition on \par POD # 5.\par \par He is seen at home, accompanied by his spouse\par Denies fever, SOB, CP or palpitations

## 2021-07-15 NOTE — ASSESSMENT
[FreeTextEntry1] : Progressing well\par Compliant with all meds/post-op cares\par medications reviewed\par taking insulin as prescribed, BS range 124-188\par [facility] services in place\par f/u appointments reviewed\par \par Education\par 1.  DC instructions reviewed with patient - discussed importance of medications (indication, schedule, side effects, and importance of compliance reviewed) and f/u appointments.\par 2.  Clean incision sites daily - shower indirectly, clean with soap and water, pat dry.  Avoid the use of lotions, ointments, powders, and perfumes on or around incision sites.\par 3.  Sternal precautions - avoid any heavy lifting (>5-10 lbs x 8 weeks), raising both arms above head simultaneously.\par 4.  Place TEDs on in AM prior to getting out of bed and off in PM when returning to bed.\par 5.  Follow a heart healthy diet - low salt, low fat.\par 6.  Exercise daily.\par 7.  Monitor and call [facility] NP for signs and symptoms of infection (redness, drainage, and fever).\par 8.  Monitor daily weights (call for a gain of 2-3 lbs/day or 5-6 lbs/week). \par 9.  Blood sugar control necessary for wound healing if applicable.\par 10.  Avoid straining during BM - use stool softners as needed. \par 11.  Instructed on importance of incentive spirometry use (10x/hour).\par \par Patient verbalized understanding of all education outlined above. Pt instructed to call [facility] NP for any issues as delineated above.\par \par PLAN\par 1.  Monitor daily weights\par 2.  Continue current medications as prescribed\par 3.  Incentive spirometry use\par 4.  Maintain sternal precautions\par 5.  Exercise daily\par 5.  Maintain HH diet\par 6.  Maintain TEDs\par 7.  Keep legs elevated\par 8.  F/U appointments - \par [facility]TSX Guy 7/12\par Cardio Teresita 7/12\par 9.  [facility] NP will continue to f/u with patient status - Pt agrees to call with any questions/concerns\par 10. To recover without complications.\par \par

## 2021-07-21 ENCOUNTER — OUTPATIENT (OUTPATIENT)
Dept: OUTPATIENT SERVICES | Facility: HOSPITAL | Age: 61
LOS: 1 days | Discharge: HOME | End: 2021-07-21
Payer: COMMERCIAL

## 2021-07-21 DIAGNOSIS — Z95.1 PRESENCE OF AORTOCORONARY BYPASS GRAFT: ICD-10-CM

## 2021-07-21 PROCEDURE — 71046 X-RAY EXAM CHEST 2 VIEWS: CPT | Mod: 26

## 2021-07-26 ENCOUNTER — APPOINTMENT (OUTPATIENT)
Dept: CARDIOTHORACIC SURGERY | Facility: CLINIC | Age: 61
End: 2021-07-26
Payer: COMMERCIAL

## 2021-07-26 VITALS
TEMPERATURE: 98.5 F | OXYGEN SATURATION: 97 % | HEIGHT: 71 IN | RESPIRATION RATE: 18 BRPM | HEART RATE: 80 BPM | WEIGHT: 194 LBS | BODY MASS INDEX: 27.16 KG/M2 | DIASTOLIC BLOOD PRESSURE: 65 MMHG | SYSTOLIC BLOOD PRESSURE: 103 MMHG

## 2021-07-26 PROCEDURE — 99024 POSTOP FOLLOW-UP VISIT: CPT

## 2021-07-29 ENCOUNTER — TRANSCRIPTION ENCOUNTER (OUTPATIENT)
Age: 61
End: 2021-07-29

## 2021-08-17 ENCOUNTER — RX RENEWAL (OUTPATIENT)
Age: 61
End: 2021-08-17

## 2021-08-30 ENCOUNTER — RX RENEWAL (OUTPATIENT)
Age: 61
End: 2021-08-30

## 2021-09-16 ENCOUNTER — RX RENEWAL (OUTPATIENT)
Age: 61
End: 2021-09-16

## 2021-09-21 ENCOUNTER — RX RENEWAL (OUTPATIENT)
Age: 61
End: 2021-09-21

## 2021-09-29 ENCOUNTER — RX RENEWAL (OUTPATIENT)
Age: 61
End: 2021-09-29

## 2021-10-11 ENCOUNTER — APPOINTMENT (OUTPATIENT)
Dept: CARDIOLOGY | Facility: CLINIC | Age: 61
End: 2021-10-11
Payer: COMMERCIAL

## 2021-10-11 VITALS
WEIGHT: 199 LBS | BODY MASS INDEX: 27.86 KG/M2 | TEMPERATURE: 97.7 F | SYSTOLIC BLOOD PRESSURE: 120 MMHG | DIASTOLIC BLOOD PRESSURE: 74 MMHG | HEIGHT: 71 IN | HEART RATE: 71 BPM

## 2021-10-11 PROCEDURE — 99213 OFFICE O/P EST LOW 20 MIN: CPT

## 2021-10-11 PROCEDURE — 93000 ELECTROCARDIOGRAM COMPLETE: CPT

## 2021-10-11 NOTE — PHYSICAL EXAM
[Well Developed] : well developed [Well Nourished] : well nourished [No Acute Distress] : no acute distress [Obese] : obese [Normal Conjunctiva] : normal conjunctiva [No Carotid Bruit] : no carotid bruit [Normal] : alert and oriented, normal memory [de-identified] : NC/AT [de-identified] : no JVD

## 2021-10-11 NOTE — ASSESSMENT
[FreeTextEntry1] : 59 y/o male with poorly controlled DM, HTN, DL, CAD, s/p CABG.\par \par Hospital records reviewed.\par Echo, cath surgery report reviewed.\par CT surgery follow-up appreciated.\par \par Secondary prevention discussed.\par DM control discussed. He reports his repeat HgA1c is 5 - he had labs with Dr. Terrazas.\par C/w medical therapy\par add low dose losartan\par C/w antiplatelet therapy\par C/w statin\par F/u Labs\par gradual increase in exertion\par \par F/u in 3 months.

## 2021-10-14 ENCOUNTER — RX RENEWAL (OUTPATIENT)
Age: 61
End: 2021-10-14

## 2021-12-07 ENCOUNTER — NON-APPOINTMENT (OUTPATIENT)
Age: 61
End: 2021-12-07

## 2022-02-28 ENCOUNTER — RX RENEWAL (OUTPATIENT)
Age: 62
End: 2022-02-28

## 2022-03-07 NOTE — ED CDU PROVIDER INITIAL DAY NOTE - NS ED ATTENDING STATEMENT MOD
Sulma Ann was admitted to [facility] from ED via bed accompanied by significant other.   Reason for hospitalization is UTI, diarrhea.   Upon arrival, patient is stable. Patient has history significant for   Past Medical History:   Diagnosis Date   • Arthritis    • COPD (chronic obstructive pulmonary disease) (CMS/[facility])     slight   • Depression    • Hearing aid worn     bilateral   • Malignant neoplasm (CMS/[facility])     skin fance, face   • Osteoporosis    • Other and unspecified hyperlipidemia    • Pneumonia    • Rotator cuff tear arthropathy 5/5/2015   • Shingles 8/1964, and 2013   • Sleep apnea     c-pap   • SOB (shortness of breath) on exertion    • Urinary incontinence    • UTI (urinary tract infection) 11/4/2019   • Wears glasses    • Wears partial dentures     upper     .  Patient oriented to bed, call light, , room and unit.  Patient provided with the following educational materials upon admission:safety, advanced directives, infection control and pain.   Level of understanding patient verbalized understanding.   Admission orders received at this time.   MD notified of patient arrival.   See Epic documentation for patient individualized nursing care plan.   Attending with

## 2022-04-11 ENCOUNTER — APPOINTMENT (OUTPATIENT)
Dept: CARDIOLOGY | Facility: CLINIC | Age: 62
End: 2022-04-11
Payer: COMMERCIAL

## 2022-04-11 ENCOUNTER — RESULT CHARGE (OUTPATIENT)
Age: 62
End: 2022-04-11

## 2022-04-11 VITALS
WEIGHT: 218 LBS | HEIGHT: 71 IN | BODY MASS INDEX: 30.52 KG/M2 | DIASTOLIC BLOOD PRESSURE: 90 MMHG | HEART RATE: 65 BPM | SYSTOLIC BLOOD PRESSURE: 138 MMHG

## 2022-04-11 PROCEDURE — 99214 OFFICE O/P EST MOD 30 MIN: CPT

## 2022-04-11 PROCEDURE — 93000 ELECTROCARDIOGRAM COMPLETE: CPT

## 2022-04-11 NOTE — PHYSICAL EXAM
[Well Developed] : well developed [Well Nourished] : well nourished [No Acute Distress] : no acute distress [Obese] : obese [Normal Conjunctiva] : normal conjunctiva [No Carotid Bruit] : no carotid bruit [Normal] : alert and oriented, normal memory [de-identified] : NC/AT [de-identified] : no JVD

## 2022-04-11 NOTE — ASSESSMENT
[FreeTextEntry1] : 62 y/o male with poorly controlled DM, HTN, DL, CAD, s/p CABG.\par Now reporting normal FS\par \par Hospital records reviewed.\par Echo, cath surgery report reviewed.\par CT surgery follow-up appreciated.\par \par Secondary prevention discussed.\par DM control discussed. He reports his repeat HgA1c is 5 - he had labs with Dr. Terrazas.\par C/w medical therapy\par c/w low dose losartan\par C/w antiplatelet therapy\par C/w statin\par F/u Labs\par gradual increase in exertion\par advised to avoid heavy exertion, specifically not to shovel.\par \par F/u in 6 months.

## 2022-04-11 NOTE — HISTORY OF PRESENT ILLNESS
[FreeTextEntry1] : 61 year old m with PMH of HTN,  hypercholesterolemia, BLE Diabetic neuropathy,  hx of  poorly controlled DM. due non compliant with medications in the past.  Currently BLS is controlled  well  Fs AC 80 -120. S/p non-ST MI,  cath revealed multivessel disease.  S/p  CABG x 4 grafts on 6/24/2021. Hx of  Bell's palsy. Pt is an  ex-smoker with a 25-pack-year history of smoking.  Pt denies any chest pain, no SOB.  Pt is staying active exercises TIW on treadmill without cardiac issues. \par \par

## 2022-05-17 ENCOUNTER — RX RENEWAL (OUTPATIENT)
Age: 62
End: 2022-05-17

## 2022-05-25 ENCOUNTER — NON-APPOINTMENT (OUTPATIENT)
Age: 62
End: 2022-05-25

## 2022-05-31 ENCOUNTER — RX RENEWAL (OUTPATIENT)
Age: 62
End: 2022-05-31

## 2022-09-12 ENCOUNTER — RX RENEWAL (OUTPATIENT)
Age: 62
End: 2022-09-12

## 2022-09-25 ENCOUNTER — NON-APPOINTMENT (OUTPATIENT)
Age: 62
End: 2022-09-25

## 2022-10-17 ENCOUNTER — RESULT CHARGE (OUTPATIENT)
Age: 62
End: 2022-10-17

## 2022-10-17 ENCOUNTER — APPOINTMENT (OUTPATIENT)
Dept: CARDIOLOGY | Facility: CLINIC | Age: 62
End: 2022-10-17

## 2022-10-17 VITALS
HEART RATE: 72 BPM | DIASTOLIC BLOOD PRESSURE: 80 MMHG | WEIGHT: 212 LBS | HEIGHT: 71 IN | BODY MASS INDEX: 29.68 KG/M2 | SYSTOLIC BLOOD PRESSURE: 130 MMHG

## 2022-10-17 PROCEDURE — 99214 OFFICE O/P EST MOD 30 MIN: CPT | Mod: 25

## 2022-10-17 PROCEDURE — 93000 ELECTROCARDIOGRAM COMPLETE: CPT

## 2022-10-17 NOTE — HISTORY OF PRESENT ILLNESS
[FreeTextEntry1] : 61 year old m with PMH of HTN,  hypercholesterolemia, BLE Diabetic neuropathy,  hx of  poorly controlled DM. due non compliant with medications in the past.  Currently BLS is controlled  well  Fs AC 80 -120. S/p non-ST MI,  cath revealed multivessel disease.  S/p  CABG x 4 grafts on 6/24/2021. Hx of  Bell's palsy. Pt is an  ex-smoker with a 25-pack-year history of smoking.  Pt denies any chest pain, no SOB.  Pt is staying active exercises TIW on treadmill without cardiac issues. Walked 4 blocks to the office today with no angina. HgA1c 5.7%.\par \par

## 2022-10-17 NOTE — ASSESSMENT
[FreeTextEntry1] : 60 y/o male with poorly controlled DM, HTN, DL, CAD, s/p CABG.\par Now reporting normal FS\par \par Hospital records reviewed.\par Echo, cath surgery report reviewed.\par CT surgery follow-up appreciated.\par Labs noted.\par \par Secondary prevention discussed.\par DM:\par \par DM control discussed. He reports his repeat HgA1c is 5.7 -  labs reviewed.\par C/w medical therapy\par \par HTN\par c/w low dose losartan\par \par CAD\par s/p CABG\par C/w antiplatelet therapy\par \par DL\par C/w statin\par F/u Labs\par gradual increase in exertion\par advised to avoid heavy exertion, specifically not to shovel.\par \par F/u in 6 months.

## 2022-10-17 NOTE — PHYSICAL EXAM
[Well Developed] : well developed [Well Nourished] : well nourished [No Acute Distress] : no acute distress [Obese] : obese [Normal Conjunctiva] : normal conjunctiva [No Carotid Bruit] : no carotid bruit [Normal] : alert and oriented, normal memory [de-identified] : NC/AT [de-identified] : no JVD

## 2022-10-17 NOTE — REASON FOR VISIT
[Coronary Artery Disease] : coronary artery disease
Quality 131: Pain Assessment And Follow-Up: Pain assessment using a standardized tool is documented as negative, no follow-up plan required
Detail Level: Detailed

## 2022-11-25 NOTE — PROGRESS NOTE ADULT - SUBJECTIVE AND OBJECTIVE BOX
Patient is a 60y old  Male who presents with a chief complaint of chest pain (21 Jun 2021 15:14)    HPI:   60y M w/ hx of DM, HLD, BPH presenting with retrosternal chest pain that started last evening after dinner.. No radiation. 4/10 in severity, non exertional . Took a baby aspirin at home. Patient had episode of chest pain last week that resolved spontaneously, no previous chest pain or hx of cardiac disease Endorses mild SOB on exertion. Denies headache, N/V, abdominal pain, diarrhea, dysuria.    Was admitted for Obs,where he underwent CCTA that showed 3V disease, EKG with no ischemic changes, troponin <0.01--> 0.02, admitted for Cath in the AM, Loaded with aspirin in the ED  (21 Jun 2021 00:01)      SUBJ:  Patient seen and examined. S/p cath. 3 vessel disease. No chest pain at this time.      MEDICATIONS  (STANDING):  aspirin  chewable 81 milliGRAM(s) Oral daily  atorvastatin 80 milliGRAM(s) Oral at bedtime  dextrose 40% Gel 15 Gram(s) Oral once  dextrose 5%. 1000 milliLiter(s) (50 mL/Hr) IV Continuous <Continuous>  dextrose 5%. 1000 milliLiter(s) (100 mL/Hr) IV Continuous <Continuous>  dextrose 50% Injectable 25 Gram(s) IV Push once  dextrose 50% Injectable 12.5 Gram(s) IV Push once  dextrose 50% Injectable 25 Gram(s) IV Push once  glucagon  Injectable 1 milliGRAM(s) IntraMuscular once  insulin glargine Injectable (LANTUS) 10 Unit(s) SubCutaneous every morning  insulin lispro (ADMELOG) corrective regimen sliding scale   SubCutaneous three times a day before meals  insulin lispro Injectable (ADMELOG) 4 Unit(s) SubCutaneous three times a day before meals  tamsulosin 0.4 milliGRAM(s) Oral at bedtime    MEDICATIONS  (PRN):  nitroglycerin     SubLingual 0.4 milliGRAM(s) SubLingual every 5 minutes PRN Chest Pain            Vital Signs Last 24 Hrs  T(C): 35.9 (21 Jun 2021 05:01), Max: 36.6 (20 Jun 2021 18:14)  T(F): 96.7 (21 Jun 2021 05:01), Max: 97.8 (20 Jun 2021 18:14)  HR: 68 (21 Jun 2021 05:01) (59 - 68)  BP: 141/78 (21 Jun 2021 05:01) (129/67 - 152/73)  BP(mean): --  RR: 18 (21 Jun 2021 05:01) (18 - 18)  SpO2: 97% (21 Jun 2021 07:41) (96% - 98%)      PHYSICAL EXAM:    GEN: AAO x 3, NAD  HEENT: NC/AT, PERRL  Neck: No JVD, no bruits  CV: Reg, S1-S2, no murmur  Lungs: CTAB  Abd: Soft, non-tender  Ext: No edema        06-20-21 @ 07:01  -  06-21-21 @ 07:00  --------------------------------------------------------  IN: 0 mL / OUT: 350 mL / NET: -350 mL    06-21-21 @ 07:01  -  06-21-21 @ 16:03  --------------------------------------------------------  IN: 0 mL / OUT: 400 mL / NET: -400 mL        I&O's Summary    20 Jun 2021 07:01  -  21 Jun 2021 07:00  --------------------------------------------------------  IN: 0 mL / OUT: 350 mL / NET: -350 mL    21 Jun 2021 07:01  -  21 Jun 2021 16:03  --------------------------------------------------------  IN: 0 mL / OUT: 400 mL / NET: -400 mL    	    TELEMETRY:    ECG:    TTE:      LABS:                        14.9   8.32  )-----------( 229      ( 21 Jun 2021 05:41 )             44.2     06-21    139  |  102  |  12  ----------------------------<  254<H>  4.3   |  27  |  0.8    Ca    9.3      21 Jun 2021 05:41  Mg     2.0     06-21    TPro  6.6  /  Alb  4.4  /  TBili  0.9  /  DBili  x   /  AST  15  /  ALT  16  /  AlkPhos  80  06-21    CARDIAC MARKERS ( 21 Jun 2021 05:41 )  x     / 0.07 ng/mL / x     / x     / x      CARDIAC MARKERS ( 20 Jun 2021 08:24 )  x     / 0.02 ng/mL / x     / x     / x      CARDIAC MARKERS ( 20 Jun 2021 05:20 )  x     / <0.01 ng/mL / x     / x     / x                BNP  RADIOLOGY & ADDITIONAL STUDIES:      IMPRESSION AND PLAN:         79

## 2022-12-05 ENCOUNTER — RX RENEWAL (OUTPATIENT)
Age: 62
End: 2022-12-05

## 2023-01-19 ENCOUNTER — NON-APPOINTMENT (OUTPATIENT)
Age: 63
End: 2023-01-19

## 2023-04-17 ENCOUNTER — APPOINTMENT (OUTPATIENT)
Dept: CARDIOLOGY | Facility: CLINIC | Age: 63
End: 2023-04-17

## 2023-06-06 ENCOUNTER — APPOINTMENT (OUTPATIENT)
Dept: CARDIOLOGY | Facility: CLINIC | Age: 63
End: 2023-06-06
Payer: COMMERCIAL

## 2023-06-06 VITALS
DIASTOLIC BLOOD PRESSURE: 71 MMHG | HEART RATE: 71 BPM | HEIGHT: 70 IN | BODY MASS INDEX: 30.64 KG/M2 | SYSTOLIC BLOOD PRESSURE: 120 MMHG | TEMPERATURE: 98 F | WEIGHT: 214 LBS

## 2023-06-06 DIAGNOSIS — E78.5 HYPERLIPIDEMIA, UNSPECIFIED: ICD-10-CM

## 2023-06-06 PROCEDURE — 99214 OFFICE O/P EST MOD 30 MIN: CPT | Mod: 25

## 2023-06-06 PROCEDURE — 93000 ELECTROCARDIOGRAM COMPLETE: CPT

## 2023-06-06 NOTE — PHYSICAL EXAM
[Well Developed] : well developed [Well Nourished] : well nourished [No Acute Distress] : no acute distress [Obese] : obese [Normal Conjunctiva] : normal conjunctiva [No Carotid Bruit] : no carotid bruit [Normal] : alert and oriented, normal memory [de-identified] : NC/AT [de-identified] : no JVD

## 2023-06-06 NOTE — DISCUSSION/SUMMARY
Impression: Primary open-angle glaucoma, bilateral, moderate stage: Q60.0730. OU. Symptoms: IOP is stable. Plan: Discussed diagnosis in detail with patient. Discussed treatment options with patient. No change to current treatment. Will continue to observe condition and or symptoms. Continue using current medication(s). Medication refill given today. Emphasized and explained compliance. [Minutes Spent: ___] : for [unfilled] ~Uminutes

## 2023-06-06 NOTE — ASSESSMENT
[FreeTextEntry1] : 61 y/o male with type 2 DM, HTN, DL, CAD, s/p CABG.\par Now reporting normal FS, A1c\par \par Hospital records reviewed.\par Echo, cath surgery report reviewed.\par CT surgery follow-up appreciated.\par Labs noted.\par \par Secondary prevention discussed.\par DM:\par \par DM control discussed. He reports his repeat HgA1c is 5.7, f/u with PMD.\par C/w medical therapy, diet\par \par HTN\par c/w low dose losartan\par BP is controlled\par low salt diet\par \par CAD\par s/p CABG\par C/w antiplatelet therapy\par ECG reviewed\par No angina\par \par DL\par C/w statin\par F/u Labs with PMD\par \par Prevention\par Secondary prevention\par gradual increase in exertion\par advised to avoid heavy exertion, specifically not to shovel.\par \par F/u in 12 months.

## 2023-06-06 NOTE — HISTORY OF PRESENT ILLNESS
[FreeTextEntry1] : 62 year old m with PMH of HTN,  hypercholesterolemia, BLE Diabetic neuropathy,  hx of  poorly controlled DM. due non compliant with medications in the past.  Currently BLS is controlled  well  Fs AC 80 -120. S/p non-ST MI,  cath revealed multivessel disease.  S/p  CABG x 4 grafts on 6/24/2021. Hx of  Bell's palsy. Pt is an  ex-smoker with a 25-pack-year history of smoking.  Pt denies any chest pain, no SOB.  Pt is staying active exercises TIW on treadmill without cardiac issues. Walked 4 blocks to the office today with no angina. Reports last HgA1c 5.7%.\par \par

## 2023-08-02 ENCOUNTER — RX RENEWAL (OUTPATIENT)
Age: 63
End: 2023-08-02

## 2024-02-05 ENCOUNTER — NON-APPOINTMENT (OUTPATIENT)
Age: 64
End: 2024-02-05

## 2024-04-09 ENCOUNTER — APPOINTMENT (OUTPATIENT)
Dept: CARDIOLOGY | Facility: CLINIC | Age: 64
End: 2024-04-09
Payer: MEDICARE

## 2024-04-09 VITALS
WEIGHT: 217 LBS | TEMPERATURE: 97.5 F | HEIGHT: 70 IN | SYSTOLIC BLOOD PRESSURE: 155 MMHG | DIASTOLIC BLOOD PRESSURE: 88 MMHG | HEART RATE: 72 BPM | BODY MASS INDEX: 31.07 KG/M2 | RESPIRATION RATE: 17 BRPM

## 2024-04-09 DIAGNOSIS — E11.9 TYPE 2 DIABETES MELLITUS W/OUT COMPLICATIONS: ICD-10-CM

## 2024-04-09 DIAGNOSIS — I25.10 ATHEROSCLEROTIC HEART DISEASE OF NATIVE CORONARY ARTERY W/OUT ANGINA PECTORIS: ICD-10-CM

## 2024-04-09 DIAGNOSIS — Z95.1 PRESENCE OF AORTOCORONARY BYPASS GRAFT: ICD-10-CM

## 2024-04-09 PROCEDURE — G2211 COMPLEX E/M VISIT ADD ON: CPT

## 2024-04-09 PROCEDURE — 93000 ELECTROCARDIOGRAM COMPLETE: CPT

## 2024-04-09 PROCEDURE — 99214 OFFICE O/P EST MOD 30 MIN: CPT

## 2024-04-09 NOTE — PHYSICAL EXAM
[Well Developed] : well developed [Well Nourished] : well nourished [No Acute Distress] : no acute distress [Obese] : obese [Normal Conjunctiva] : normal conjunctiva [No Carotid Bruit] : no carotid bruit [Normal] : alert and oriented, normal memory [de-identified] : NC/AT [de-identified] : no JVD

## 2024-04-09 NOTE — ASSESSMENT
[FreeTextEntry1] : 62 y/o male with type 2 DM, HTN, DL, CAD, s/p CABG. Now reporting normal FS, A1c  CAD Hospital records reviewed. Echo, cath surgery report reviewed. CT surgery follow-up appreciated. Labs noted. Given the episodes of dyspnea, will get a stress NPI and echo to evaluate. Option of cath discussed, but would hold off for now if no further episodes. Secondary prevention discussed. C/w antiplatelet therapy  DM:  DM control discussed. He reports his repeat HgA1c is 5.7, f/u with PMD. C/w medical therapy, diet  HTN c/w low dose losartan BP is controlled low salt diet   DL C/w statin F/u Labs with PMD  Prevention Secondary prevention gradual increase in exertion advised to avoid heavy exertion, specifically not to shovel.  F/u after the tests.

## 2024-04-09 NOTE — HISTORY OF PRESENT ILLNESS
[FreeTextEntry1] : 63 year old m with PMH of HTN, hypercholesterolemia, Type 2 DM, BLE Diabetic neuropathy,  CAD, s/p non-ST MI,  cath revealed multivessel disease.  S/p  CABG x 4 grafts on 6/24/2021. Hx of  Bell's palsy. Pt is an  ex-smoker with a 25-pack-year history of smoking.  Pt denies any chest pain, but since the last visit had episodes of SOB when walking on the stairs from his basement. Also he had an episode of waking up sweating.  Reports DM is controlled.

## 2024-04-29 ENCOUNTER — APPOINTMENT (OUTPATIENT)
Dept: CARDIOLOGY | Facility: CLINIC | Age: 64
End: 2024-04-29
Payer: MEDICARE

## 2024-04-29 PROCEDURE — 93306 TTE W/DOPPLER COMPLETE: CPT

## 2024-05-31 ENCOUNTER — RESULT REVIEW (OUTPATIENT)
Age: 64
End: 2024-05-31

## 2024-05-31 ENCOUNTER — OUTPATIENT (OUTPATIENT)
Dept: OUTPATIENT SERVICES | Facility: HOSPITAL | Age: 64
LOS: 1 days | End: 2024-05-31
Payer: MEDICARE

## 2024-05-31 DIAGNOSIS — Z00.8 ENCOUNTER FOR OTHER GENERAL EXAMINATION: ICD-10-CM

## 2024-05-31 DIAGNOSIS — I25.10 ATHEROSCLEROTIC HEART DISEASE OF NATIVE CORONARY ARTERY WITHOUT ANGINA PECTORIS: ICD-10-CM

## 2024-05-31 PROCEDURE — 78452 HT MUSCLE IMAGE SPECT MULT: CPT

## 2024-05-31 PROCEDURE — 78452 HT MUSCLE IMAGE SPECT MULT: CPT | Mod: 26

## 2024-05-31 PROCEDURE — A9500: CPT

## 2024-05-31 PROCEDURE — 93018 CV STRESS TEST I&R ONLY: CPT

## 2024-06-01 DIAGNOSIS — I25.10 ATHEROSCLEROTIC HEART DISEASE OF NATIVE CORONARY ARTERY WITHOUT ANGINA PECTORIS: ICD-10-CM

## 2024-06-04 ENCOUNTER — APPOINTMENT (OUTPATIENT)
Dept: CARDIOLOGY | Facility: CLINIC | Age: 64
End: 2024-06-04
Payer: MEDICARE

## 2024-06-04 VITALS
TEMPERATURE: 97.1 F | HEIGHT: 70 IN | DIASTOLIC BLOOD PRESSURE: 90 MMHG | BODY MASS INDEX: 31.5 KG/M2 | HEART RATE: 73 BPM | SYSTOLIC BLOOD PRESSURE: 142 MMHG | WEIGHT: 220 LBS

## 2024-06-04 PROCEDURE — 93000 ELECTROCARDIOGRAM COMPLETE: CPT

## 2024-06-04 PROCEDURE — 99213 OFFICE O/P EST LOW 20 MIN: CPT

## 2024-06-04 PROCEDURE — G2211 COMPLEX E/M VISIT ADD ON: CPT

## 2024-06-04 RX ORDER — INSULIN GLARGINE-YFGN 100 [IU]/ML
100 INJECTION, SOLUTION SUBCUTANEOUS DAILY
Refills: 0 | Status: ACTIVE | COMMUNITY

## 2024-06-04 RX ORDER — ASPIRIN 81 MG/1
81 TABLET, COATED ORAL
Qty: 90 | Refills: 3 | Status: ACTIVE | COMMUNITY
Start: 2021-08-17

## 2024-06-04 RX ORDER — LOSARTAN POTASSIUM 25 MG/1
25 TABLET, FILM COATED ORAL DAILY
Qty: 30 | Refills: 11 | Status: ACTIVE | COMMUNITY
Start: 2021-10-11

## 2024-06-04 RX ORDER — METFORMIN HYDROCHLORIDE 1000 MG/1
1000 TABLET, COATED ORAL
Qty: 60 | Refills: 6 | Status: ACTIVE | COMMUNITY
Start: 2021-06-30

## 2024-06-04 RX ORDER — METOPROLOL TARTRATE 25 MG/1
25 TABLET, FILM COATED ORAL
Qty: 30 | Refills: 5 | Status: ACTIVE | COMMUNITY
Start: 2021-06-30

## 2024-06-04 RX ORDER — ATORVASTATIN CALCIUM 20 MG/1
20 TABLET, FILM COATED ORAL DAILY
Qty: 90 | Refills: 3 | Status: ACTIVE | COMMUNITY
Start: 2021-06-30

## 2024-06-04 RX ORDER — CLOPIDOGREL BISULFATE 75 MG/1
75 TABLET, FILM COATED ORAL
Qty: 30 | Refills: 8 | Status: ACTIVE | COMMUNITY
Start: 2021-06-30

## 2024-06-04 NOTE — ASSESSMENT
[FreeTextEntry1] : 62 y/o male with type 2 DM, HTN, DL, CAD, s/p CABG. Now reporting normal FS, A1c  CAD Hospital records reviewed. Echo, cath surgery report reviewed. CT surgery follow-up appreciated. Labs noted. Normal stress NPI and echo. No further episodes opr angina or dyspnea. Secondary prevention discussed. C/w antiplatelet therapy  DM:  DM control discussed. He reports his repeat HgA1c is 5.7, f/u with PMD. C/w medical therapy, diet  HTN c/w low dose losartan BP is controlled low salt diet   DL C/w statin F/u Labs with PMD  Prevention Secondary prevention gradual increase in exertion advised to avoid heavy exertion, specifically not to shovel.  F/u in 6 months

## 2024-06-04 NOTE — PHYSICAL EXAM
[Well Developed] : well developed [Well Nourished] : well nourished [No Acute Distress] : no acute distress [Obese] : obese [Normal Conjunctiva] : normal conjunctiva [No Carotid Bruit] : no carotid bruit [Normal] : alert and oriented, normal memory [de-identified] : NC/AT [de-identified] : no JVD

## 2024-06-04 NOTE — HISTORY OF PRESENT ILLNESS
[FreeTextEntry1] : 63 year old m with PMH of HTN, hypercholesterolemia, Type 2 DM, BLE Diabetic neuropathy,  CAD, s/p non-ST MI,  cath revealed multivessel disease.  S/p  CABG x 4 grafts on 6/24/2021. Hx of  Bell's palsy. Pt is an  ex-smoker with a 25-pack-year history of smoking.  No further episodes of chest pain. Nuclear stress - negative for ischemia. Echo - normal EF.

## 2024-06-17 ENCOUNTER — NON-APPOINTMENT (OUTPATIENT)
Age: 64
End: 2024-06-17

## 2025-01-14 ENCOUNTER — NON-APPOINTMENT (OUTPATIENT)
Age: 65
End: 2025-01-14

## 2025-02-18 ENCOUNTER — APPOINTMENT (OUTPATIENT)
Dept: ORTHOPEDIC SURGERY | Facility: CLINIC | Age: 65
End: 2025-02-18
Payer: MEDICARE

## 2025-02-18 VITALS — WEIGHT: 220 LBS | HEIGHT: 70 IN | BODY MASS INDEX: 31.5 KG/M2

## 2025-02-18 DIAGNOSIS — I51.9 HEART DISEASE, UNSPECIFIED: ICD-10-CM

## 2025-02-18 DIAGNOSIS — S49.90XA UNSPECIFIED INJURY OF SHOULDER AND UPPER ARM, UNSPECIFIED ARM, INITIAL ENCOUNTER: ICD-10-CM

## 2025-02-18 PROCEDURE — 73030 X-RAY EXAM OF SHOULDER: CPT | Mod: RT

## 2025-02-18 PROCEDURE — 99203 OFFICE O/P NEW LOW 30 MIN: CPT

## 2025-03-11 ENCOUNTER — APPOINTMENT (OUTPATIENT)
Dept: ORTHOPEDIC SURGERY | Facility: CLINIC | Age: 65
End: 2025-03-11
Payer: MEDICARE

## 2025-03-11 DIAGNOSIS — S49.90XA UNSPECIFIED INJURY OF SHOULDER AND UPPER ARM, UNSPECIFIED ARM, INITIAL ENCOUNTER: ICD-10-CM

## 2025-03-11 PROCEDURE — 99203 OFFICE O/P NEW LOW 30 MIN: CPT

## 2025-04-29 ENCOUNTER — APPOINTMENT (OUTPATIENT)
Dept: ORTHOPEDIC SURGERY | Facility: CLINIC | Age: 65
End: 2025-04-29
Payer: MEDICARE

## 2025-04-29 DIAGNOSIS — S49.90XA UNSPECIFIED INJURY OF SHOULDER AND UPPER ARM, UNSPECIFIED ARM, INITIAL ENCOUNTER: ICD-10-CM

## 2025-04-29 PROCEDURE — 99213 OFFICE O/P EST LOW 20 MIN: CPT

## 2025-06-03 ENCOUNTER — APPOINTMENT (OUTPATIENT)
Dept: CARDIOLOGY | Facility: CLINIC | Age: 65
End: 2025-06-03
Payer: MEDICARE

## 2025-06-03 VITALS — DIASTOLIC BLOOD PRESSURE: 81 MMHG | HEART RATE: 67 BPM | SYSTOLIC BLOOD PRESSURE: 157 MMHG

## 2025-06-03 VITALS — HEIGHT: 70 IN | BODY MASS INDEX: 30.78 KG/M2 | WEIGHT: 215 LBS

## 2025-06-03 DIAGNOSIS — E78.5 HYPERLIPIDEMIA, UNSPECIFIED: ICD-10-CM

## 2025-06-03 DIAGNOSIS — Z95.1 PRESENCE OF AORTOCORONARY BYPASS GRAFT: ICD-10-CM

## 2025-06-03 DIAGNOSIS — E11.9 TYPE 2 DIABETES MELLITUS W/OUT COMPLICATIONS: ICD-10-CM

## 2025-06-03 DIAGNOSIS — I25.10 ATHEROSCLEROTIC HEART DISEASE OF NATIVE CORONARY ARTERY W/OUT ANGINA PECTORIS: ICD-10-CM

## 2025-06-03 PROCEDURE — 93000 ELECTROCARDIOGRAM COMPLETE: CPT

## 2025-06-03 PROCEDURE — 99214 OFFICE O/P EST MOD 30 MIN: CPT

## 2025-06-03 PROCEDURE — G2211 COMPLEX E/M VISIT ADD ON: CPT

## 2025-06-06 NOTE — PHYSICAL THERAPY INITIAL EVALUATION ADULT - PHYSICAL ASSIST/NONPHYSICAL ASSIST: GAIT, REHAB EVAL
set-up required/verbal cues/1 person assist pt asymptomatic in ED, BP improving w/o intervention. NO CP, SOB, AP, F,C, cough, AP

## 2025-06-16 ENCOUNTER — NON-APPOINTMENT (OUTPATIENT)
Age: 65
End: 2025-06-16

## 2025-06-26 RX ORDER — LOSARTAN POTASSIUM 100 MG/1
100 TABLET, FILM COATED ORAL DAILY
Qty: 90 | Refills: 3 | Status: ACTIVE | COMMUNITY
Start: 2025-06-26 | End: 1900-01-01